# Patient Record
Sex: FEMALE | Race: WHITE | NOT HISPANIC OR LATINO | Employment: UNEMPLOYED | URBAN - METROPOLITAN AREA
[De-identification: names, ages, dates, MRNs, and addresses within clinical notes are randomized per-mention and may not be internally consistent; named-entity substitution may affect disease eponyms.]

---

## 2018-02-09 ENCOUNTER — RADIANT APPOINTMENT (OUTPATIENT)
Dept: GENERAL RADIOLOGY | Facility: CLINIC | Age: 28
End: 2018-02-09
Attending: FAMILY MEDICINE
Payer: COMMERCIAL

## 2018-02-09 ENCOUNTER — OFFICE VISIT (OUTPATIENT)
Dept: FAMILY MEDICINE | Facility: CLINIC | Age: 28
End: 2018-02-09
Payer: COMMERCIAL

## 2018-02-09 VITALS
HEART RATE: 76 BPM | HEIGHT: 67 IN | BODY MASS INDEX: 26.63 KG/M2 | SYSTOLIC BLOOD PRESSURE: 110 MMHG | TEMPERATURE: 98.5 F | DIASTOLIC BLOOD PRESSURE: 62 MMHG | WEIGHT: 169.7 LBS

## 2018-02-09 DIAGNOSIS — F41.1 GENERALIZED ANXIETY DISORDER: Primary | ICD-10-CM

## 2018-02-09 DIAGNOSIS — R07.89 ATYPICAL CHEST PAIN: ICD-10-CM

## 2018-02-09 PROCEDURE — 71046 X-RAY EXAM CHEST 2 VIEWS: CPT | Mod: FY

## 2018-02-09 PROCEDURE — 99203 OFFICE O/P NEW LOW 30 MIN: CPT | Performed by: FAMILY MEDICINE

## 2018-02-09 RX ORDER — ESCITALOPRAM OXALATE 10 MG/1
10 TABLET ORAL DAILY
Qty: 30 TABLET | Refills: 1 | Status: SHIPPED | OUTPATIENT
Start: 2018-02-09 | End: 2018-05-11

## 2018-02-09 ASSESSMENT — ANXIETY QUESTIONNAIRES
3. WORRYING TOO MUCH ABOUT DIFFERENT THINGS: MORE THAN HALF THE DAYS
GAD7 TOTAL SCORE: 11
7. FEELING AFRAID AS IF SOMETHING AWFUL MIGHT HAPPEN: MORE THAN HALF THE DAYS
1. FEELING NERVOUS, ANXIOUS, OR ON EDGE: MORE THAN HALF THE DAYS
5. BEING SO RESTLESS THAT IT IS HARD TO SIT STILL: NOT AT ALL
6. BECOMING EASILY ANNOYED OR IRRITABLE: SEVERAL DAYS
IF YOU CHECKED OFF ANY PROBLEMS ON THIS QUESTIONNAIRE, HOW DIFFICULT HAVE THESE PROBLEMS MADE IT FOR YOU TO DO YOUR WORK, TAKE CARE OF THINGS AT HOME, OR GET ALONG WITH OTHER PEOPLE: SOMEWHAT DIFFICULT
2. NOT BEING ABLE TO STOP OR CONTROL WORRYING: MORE THAN HALF THE DAYS

## 2018-02-09 ASSESSMENT — PATIENT HEALTH QUESTIONNAIRE - PHQ9: 5. POOR APPETITE OR OVEREATING: MORE THAN HALF THE DAYS

## 2018-02-09 NOTE — PROGRESS NOTES
SUBJECTIVE:   Amina Gramajo is a 27 year old female who presents to clinic today for the following health issues:    Urgent care couple weeks ago for chest pain  [facility] urgent care - January 25th  Chest pain started about one month ago  Seems different than the GERD she had with pregnancy  Feels like twinge - discomfort  Notices throughout the day and last for seconds  Unable to associated any symptoms   Not reproducible  No SOB, no cough, no exertional component  No association with food -   No nausea  Family hx - mom but paternal is AI  At the urgent care had EKG -         Hx of heart burn with pregnancy      Increasing anxiety the past few months  Started on esitalopram 5mg and just increased to 10mg daily this week  Had headaches for the first few days and nausea but resolved.      Second baby born 12/2016 - no postpartum issues      -------------------------------------    Problem list and histories reviewed & adjusted, as indicated.  Additional history: as documented    Patient Active Problem List   Diagnosis     Generalized anxiety disorder     Past Surgical History:   Procedure Laterality Date     APPENDECTOMY       ORTHOPEDIC SURGERY      wrist surgey       Social History   Substance Use Topics     Smoking status: Never Smoker     Smokeless tobacco: Never Used     Alcohol use Yes      Comment: few times a week     Family History   Problem Relation Age of Onset     Depression Maternal Grandmother      Dementia Maternal Grandmother      Depression Sister            Reviewed and updated as needed this visit by clinical staff  Tobacco  Allergies  Meds  Problems  Med Hx  Soc Hx      Reviewed and updated as needed this visit by Provider  Allergies  Meds  Problems  Med Hx         ROS:  Constitutional, HEENT, cardiovascular, pulmonary, GI, , musculoskeletal, neuro, skin, endocrine and psych systems are negative, except as otherwise noted.    OBJECTIVE:     /62  Pulse 76  Temp 98.5  F  "(36.9  C) (Oral)  Ht 5' 6.5\" (1.689 m)  Wt 169 lb 11.2 oz (77 kg)  BMI 26.98 kg/m2  Body mass index is 26.98 kg/(m^2).  GENERAL: healthy, alert and no distress  NECK: no adenopathy, no asymmetry, masses, or scars and thyroid normal to palpation  RESP: lungs clear to auscultation - no rales, rhonchi or wheezes  BREAST: normal without masses, tenderness or nipple discharge and no palpable axillary masses or adenopathy  CV: regular rate and rhythm, normal S1 S2, no S3 or S4, no murmur, click or rub, no peripheral edema and peripheral pulses strong    Diagnostic Test Results:  CXR - normal - no abnormalities     ASSESSMENT/PLAN:     1. Chest pain  Atypical chest pain -   EKG done at urgent care was normal and CXR today is normal.  Unclear etiology   Advised to monitor and RTC if not improving or any new symptoms arise.  Monitor for associated, aggravating and relieving factors.  - XR Chest 2 Views    2. Generalized anxiety disorder  Refilled for one more month  Will see back in one month for annual physcial and can f/u on anxiety  - escitalopram (LEXAPRO) 10 MG tablet; Take 1 tablet (10 mg) by mouth daily  Dispense: 30 tablet; Refill: 1    Pt will call or RTC if symptoms worsen or do not improve.      Ramila Reynolds, DO  [facility]    "

## 2018-02-09 NOTE — MR AVS SNAPSHOT
"              After Visit Summary   2018    Amina Gramajo    MRN: [id number]           Patient Information     Date Of Birth          1990        Visit Information        Provider Department      2018 12:30 PM Ramila Reynolds DO [facility]        Today's Diagnoses     Generalized anxiety disorder    -  1    Atypical chest pain           Follow-ups after your visit        Who to contact     If you have questions or need follow up information about today's clinic visit or your schedule please contact [facility] directly at [phone].  Normal or non-critical lab and imaging results will be communicated to you by [employer], letter or phone within 4 business days after the clinic has received the results. If you do not hear from us within 7 days, please contact the clinic through [employer] or phone. If you have a critical or abnormal lab result, we will notify you by phone as soon as possible.  Submit refill requests through [employer] or call your pharmacy and they will forward the refill request to us. Please allow 3 business days for your refill to be completed.          Additional Information About Your Visit        MyChart Information     [employer] lets you send messages to your doctor, view your test results, renew your prescriptions, schedule appointments and more. To sign up, go to [email or web address] . Click on \"Log in\" on the left side of the screen, which will take you to the Welcome page. Then click on \"Sign up Now\" on the right side of the page.     You will be asked to enter the access code listed below, as well as some personal information. Please follow the directions to create your username and password.     Your access code is: [email or web address]  Expires: 5/10/2018  2:24 PM     Your access code will  in 90 days. If you need help or a new code, please call your [place] clinic or [phone].        Care EveryWhere ID     This is your Care EveryWhere ID. This " CC:  Patient presents with:  New Patient: dizziness x2 weeks when she is laying down and stands up  Other: has an appointment with oral surgeon      HPI: 15year old female here for dizziness x 2 weeks.    Started after she did not get much sleep and had be "could be used by other organizations to access your [place] medical records  [id number]        Your Vitals Were     Pulse Temperature Height BMI (Body Mass Index)          76 98.5  F (36.9  C) (Oral) 5' 6.5\" (1.689 m) 26.98 kg/m2         Blood Pressure from Last 3 Encounters:   02/09/18 110/62   02/06/13 106/69    Weight from Last 3 Encounters:   02/09/18 169 lb 11.2 oz (77 kg)   09/30/06 126 lb 14.4 oz (57.6 kg) (66 %)*     * Growth percentiles are based on [facility] 2-20 Years data.              We Performed the Following     XR Chest 2 Views          Today's Medication Changes          These changes are accurate as of 2/9/18  2:24 PM.  If you have any questions, ask your nurse or doctor.               Start taking these medicines.        Dose/Directions    escitalopram 10 MG tablet   Commonly known as:  LEXAPRO   Used for:  Generalized anxiety disorder   Started by:  Ramila Reynolds DO        Dose:  10 mg   Take 1 tablet (10 mg) by mouth daily   Quantity:  30 tablet   Refills:  1         Stop taking these medicines if you haven't already. Please contact your care team if you have questions.     ADVIL 200 MG tablet   Generic drug:  ibuprofen   Stopped by:  Ramila Reynolds DO           PHENERGAN 25 MG tablet   Generic drug:  promethazine   Stopped by:  Ramila Reynolds DO           PREVACID 30 MG CR capsule   Generic drug:  LANsoprazole   Stopped by:  Ramila Reynolds DO                Where to get your medicines      These medications were sent to [place] [address] [place] [id number]     Phone:  [phone]     escitalopram 10 MG tablet                Primary Care Provider Office Phone # Fax #    Ramila Reynolds [phone] [address] [address]        Equal Access to Services     PINO PITTS AH: Mau ring Soyves, waaxda luqadaha, qaybta kaalmafany hwang. " Alcohol Use: Not on file    Drug Use: Not on file    Sexual Activity: Not on file   Not on file  Other Topics Concern   None on file     Social History Narrative   None on file         Current Outpatient Prescriptions:  Calcium Carbonate (CALCIUM 600 OR DO John  01/31/2017  1:16 PM [facility] [phone].    ATENCIÓN: Si habla servando, tiene a lin disposición servicios gratuitos de asistencia lingüística. Floyd al [phone].    We comply with applicable federal civil rights laws and [place] laws. We do not discriminate on the basis of race, color, national origin, age, disability, sex, sexual orientation, or gender identity.            Thank you!     Thank you for choosing [facility]  for your care. Our goal is always to provide you with excellent care. Hearing back from our patients is one way we can continue to improve our services. Please take a few minutes to complete the written survey that you may receive in the mail after your visit with us. Thank you!             Your Updated Medication List - Protect others around you: Learn how to safely use, store and throw away your medicines at www.disposemymeds.org.          This list is accurate as of 2/9/18  2:24 PM.  Always use your most recent med list.                   Brand Name Dispense Instructions for use Diagnosis    escitalopram 10 MG tablet    LEXAPRO    30 tablet    Take 1 tablet (10 mg) by mouth daily    Generalized anxiety disorder

## 2018-02-10 ENCOUNTER — HEALTH MAINTENANCE LETTER (OUTPATIENT)
Age: 28
End: 2018-02-10

## 2018-02-10 ASSESSMENT — ANXIETY QUESTIONNAIRES: GAD7 TOTAL SCORE: 11

## 2018-02-10 ASSESSMENT — PATIENT HEALTH QUESTIONNAIRE - PHQ9: SUM OF ALL RESPONSES TO PHQ QUESTIONS 1-9: 1

## 2018-02-11 ENCOUNTER — HEALTH MAINTENANCE LETTER (OUTPATIENT)
Age: 28
End: 2018-02-11

## 2018-03-22 ENCOUNTER — NURSE TRIAGE (OUTPATIENT)
Dept: NURSING | Facility: CLINIC | Age: 28
End: 2018-03-22

## 2018-03-22 NOTE — TELEPHONE ENCOUNTER
"Patient is calling from [place] and states she went out of town and forgot her medication. Patient gets her medications through [facility] and will call [facility] and ask for refill. Patient agrees with plan.  Additional Information    Negative: Drug overdose and nurse unable to answer question    Negative: Caller requesting information not related to medicine    Negative: Caller requesting a prescription for Strep throat and has a positive culture result    Negative: Rash while taking a medication or within 3 days of stopping it    Negative: Immunization reaction suspected    Negative: [1] Asthma and [2] having symptoms of asthma (cough, wheezing, etc)    Negative: MORE THAN A DOUBLE DOSE of a prescription or over-the-counter (OTC) drug    Negative: [1] DOUBLE DOSE (an extra dose or lesser amount) of over-the-counter (OTC) drug AND [2] any symptoms (e.g., dizziness, nausea, pain, sleepiness)    Negative: [1] DOUBLE DOSE (an extra dose or lesser amount) of prescription drug AND [2] any symptoms (e.g., dizziness, nausea, pain, sleepiness)    Negative: Took another person's prescription drug    Negative: [1] DOUBLE DOSE (an extra dose or lesser amount) of prescription drug AND [2] NO symptoms (Exception: a double dose of antibiotics)    Negative: Diabetes drug error or overdose (e.g., insulin or extra dose)    Negative: [1] Request for URGENT new prescription or refill of \"essential\" medication (i.e., likelihood of harm to patient if not taken) AND [2] triager unable to fill per unit policy    Negative: [1] Prescription not at pharmacy AND [2] was prescribed today by PCP    Negative: Pharmacy calling with prescription questions and triager unable to answer question    Negative: Caller has URGENT medication question about med that PCP prescribed and triager unable to answer question    Negative: Caller has NON-URGENT medication question about med that PCP prescribed and triager unable to answer question    Negative: " Caller requesting a NON-URGENT new prescription or refill and triager unable to refill per unit policy    Negative: Caller has medication question about med not prescribed by PCP and triager unable to answer question (e.g., compatibility with other med, storage)    Negative: [1] DOUBLE DOSE (an extra dose or lesser amount) of over-the-counter (OTC) drug AND [2] NO symptoms (all triage questions negative)    Negative: [1] DOUBLE DOSE (an extra dose or lesser amount) of antibiotic drug AND [2] NO symptoms (all triage questions negative)    Caller has medication question only, adult not sick, and triager answers question    Protocols used: MEDICATION QUESTION CALL-ADULT-

## 2018-04-12 ENCOUNTER — TELEPHONE (OUTPATIENT)
Dept: FAMILY MEDICINE | Facility: CLINIC | Age: 28
End: 2018-04-12

## 2018-04-12 NOTE — LETTER
April 12, 2018    Amina Gramajo  [address] [place] [address]      Dear Amina,    In order to ensure we are providing the best quality care, we have reviewed your chart and see that you are due for:    1. Physical  2. Pap Smear  3. Follow up on Medicatoin    Please call the clinic at your earliest convenience to schedule an appointment.  If you have completed these please contact our office via phone or [employer] to update our records.      Thank you for trusting us with your health care.      Sincerely,    Care Team for Ramila Reynolds MD

## 2018-04-12 NOTE — LETTER
In order to ensure we are providing the best quality care, we have reviewed your chart and see that you are due for:    1. Physical  2. Pap Smear  3. Follow up on Medicatoin    Please call the clinic at your earliest convenience to schedule an appointment.  If you have completed these please contact our office via phone or [employer] to update our records.      Thank you for trusting us with your health care.      Sincerely,    Care Team for Ramila Reynolds MD

## 2018-04-18 DIAGNOSIS — F41.1 GENERALIZED ANXIETY DISORDER: Primary | ICD-10-CM

## 2018-04-18 NOTE — TELEPHONE ENCOUNTER
"Due for follow up/physical.  Note from 2/9/18 OV:  2. Generalized anxiety disorder  Refilled for one more month  Will see back in one month for annual physcial and can f/u on anxiety  - escitalopram (LEXAPRO) 10 MG tablet; Take 1 tablet (10 mg) by mouth daily  Dispense: 30 tablet; Refill: 1     Zuleima Bernard RN  Requested Prescriptions   Pending Prescriptions Disp Refills     escitalopram (LEXAPRO) 10 MG tablet [Pharmacy Med Name: ESCITALOPRAM 10 MG TABLET] 30 tablet 0     Sig: TAKE 1 TABLET (10 MG) BY MOUTH DAILY    SSRIs Protocol Passed    4/18/2018  2:13 AM       Passed - Recent (12 mo) or future (30 days) visit within the authorizing provider's specialty    Patient had office visit in the last 12 months or has a visit in the next 30 days with authorizing provider or within the authorizing provider's specialty.  See \"Patient Info\" tab in inbasket, or \"Choose Columns\" in Meds & Orders section of the refill encounter.           Passed - Patient is age 18 or older       Passed - No active pregnancy on record       Passed - No positive pregnancy test in last 12 months            "

## 2018-04-20 RX ORDER — ESCITALOPRAM OXALATE 10 MG/1
TABLET ORAL
Qty: 30 TABLET | Refills: 0 | Status: SHIPPED | OUTPATIENT
Start: 2018-04-20 | End: 2018-05-11

## 2018-04-20 NOTE — TELEPHONE ENCOUNTER
Pt has physical scheduled on 5/11 at 11:30 ( which was 's first available) she is requesting  We proceed with the refill request

## 2018-04-20 NOTE — TELEPHONE ENCOUNTER
PN,  Left VM #2 for patient  See below messages  No response from patient to our outreach  Please advise on further refill  Thanks,  Kyra LUGO RN

## 2018-05-11 ENCOUNTER — RESULT FOLLOW UP (OUTPATIENT)
Dept: FAMILY MEDICINE | Facility: CLINIC | Age: 28
End: 2018-05-11

## 2018-05-11 ENCOUNTER — OFFICE VISIT (OUTPATIENT)
Dept: FAMILY MEDICINE | Facility: CLINIC | Age: 28
End: 2018-05-11
Payer: COMMERCIAL

## 2018-05-11 VITALS
HEIGHT: 67 IN | BODY MASS INDEX: 26.34 KG/M2 | OXYGEN SATURATION: 99 % | DIASTOLIC BLOOD PRESSURE: 58 MMHG | WEIGHT: 167.8 LBS | SYSTOLIC BLOOD PRESSURE: 108 MMHG | HEART RATE: 84 BPM | TEMPERATURE: 99.1 F

## 2018-05-11 DIAGNOSIS — F41.1 GENERALIZED ANXIETY DISORDER: ICD-10-CM

## 2018-05-11 DIAGNOSIS — Z98.890 HISTORY OF COLPOSCOPY: ICD-10-CM

## 2018-05-11 DIAGNOSIS — Z00.00 ENCOUNTER FOR ROUTINE ADULT HEALTH EXAMINATION WITHOUT ABNORMAL FINDINGS: Primary | ICD-10-CM

## 2018-05-11 DIAGNOSIS — Z32.01 PREGNANCY TEST POSITIVE: ICD-10-CM

## 2018-05-11 LAB — BETA HCG QUAL IFA URINE: POSITIVE

## 2018-05-11 PROCEDURE — 99213 OFFICE O/P EST LOW 20 MIN: CPT | Mod: 25 | Performed by: FAMILY MEDICINE

## 2018-05-11 PROCEDURE — 99395 PREV VISIT EST AGE 18-39: CPT | Performed by: FAMILY MEDICINE

## 2018-05-11 PROCEDURE — 87591 N.GONORRHOEAE DNA AMP PROB: CPT | Performed by: FAMILY MEDICINE

## 2018-05-11 PROCEDURE — 87491 CHLMYD TRACH DNA AMP PROBE: CPT | Performed by: FAMILY MEDICINE

## 2018-05-11 PROCEDURE — 84703 CHORIONIC GONADOTROPIN ASSAY: CPT | Performed by: FAMILY MEDICINE

## 2018-05-11 PROCEDURE — G0145 SCR C/V CYTO,THINLAYER,RESCR: HCPCS | Performed by: FAMILY MEDICINE

## 2018-05-11 RX ORDER — ESCITALOPRAM OXALATE 10 MG/1
TABLET ORAL
Qty: 45 TABLET | Refills: 0 | Status: SHIPPED | OUTPATIENT
Start: 2018-05-11 | End: 2018-06-27

## 2018-05-11 ASSESSMENT — ANXIETY QUESTIONNAIRES
1. FEELING NERVOUS, ANXIOUS, OR ON EDGE: NOT AT ALL
2. NOT BEING ABLE TO STOP OR CONTROL WORRYING: NOT AT ALL
GAD7 TOTAL SCORE: 0
3. WORRYING TOO MUCH ABOUT DIFFERENT THINGS: NOT AT ALL
6. BECOMING EASILY ANNOYED OR IRRITABLE: NOT AT ALL
7. FEELING AFRAID AS IF SOMETHING AWFUL MIGHT HAPPEN: NOT AT ALL
IF YOU CHECKED OFF ANY PROBLEMS ON THIS QUESTIONNAIRE, HOW DIFFICULT HAVE THESE PROBLEMS MADE IT FOR YOU TO DO YOUR WORK, TAKE CARE OF THINGS AT HOME, OR GET ALONG WITH OTHER PEOPLE: NOT DIFFICULT AT ALL
5. BEING SO RESTLESS THAT IT IS HARD TO SIT STILL: NOT AT ALL

## 2018-05-11 ASSESSMENT — PATIENT HEALTH QUESTIONNAIRE - PHQ9: 5. POOR APPETITE OR OVEREATING: NOT AT ALL

## 2018-05-11 NOTE — LETTER
April 28, 2019      Amina Aguayo  [address] [facility] [address]    Dear ,      This letter is to remind you that you are due for your follow up PAP smear on or about 05/11/19.    Please call [phone] to schedule your appointment at your earliest convenience.     If you have completed the tests outside of [place], please have the results forwarded to our office. We will update the chart for your primary Physician to review before your next annual physical.     Sincerely,      Your [place] Care Team/[facility]

## 2018-05-11 NOTE — LETTER
May 16, 2018      Amina DAVID Avila  [address] [facility] [address]    Dear ,      I am happy to inform you that your recent cervical cancer screening test (PAP smear) was normal.      Preventative screenings such as this help to ensure your health for years to come. You should repeat a pap smear in 1 year, unless otherwise directed.      You will also need to return to the clinic every year for your annual exam and other preventive tests.     Please contact the clinic at [phone] if you have further questions.       Sincerely,      Ramila Reynolds DO/[facility]

## 2018-05-11 NOTE — MR AVS SNAPSHOT
After Visit Summary   5/11/2018    Amina Gramajo    MRN: [id number]           Patient Information     Date Of Birth          1990        Visit Information        Provider Department      5/11/2018 11:30 AM Ramila Reynolds DO [facility]        Today's Diagnoses     Encounter for routine adult health examination without abnormal findings    -  1    Pregnancy test positive        Generalized anxiety disorder          Care Instructions      Preventive Health Recommendations  Female Ages 26 - 39  Yearly exam:   See your health care provider every year in order to    Review health changes.     Discuss preventive care.      Review your medicines if you your doctor has prescribed any.    Until age 30: Get a Pap test every three years (more often if you have had an abnormal result).    After age 30: Talk to your doctor about whether you should have a Pap test every 3 years or have a Pap test with HPV screening every 5 years.   You do not need a Pap test if your uterus was removed (hysterectomy) and you have not had cancer.  You should be tested each year for STDs (sexually transmitted diseases), if you're at risk.   Talk to your provider about how often to have your cholesterol checked.  If you are at risk for diabetes, you should have a diabetes test (fasting glucose).  Shots: Get a flu shot each year. Get a tetanus shot every 10 years.   Nutrition:     Eat at least 5 servings of fruits and vegetables each day.    Eat whole-grain bread, whole-wheat pasta and brown rice instead of white grains and rice.    Talk to your provider about Calcium and Vitamin D.     Lifestyle    Exercise at least 150 minutes a week (30 minutes a day, 5 days of the week). This will help you control your weight and prevent disease.    Limit alcohol to one drink per day.    No smoking.     Wear sunscreen to prevent skin cancer.    See your dentist every six months for an exam and cleaning.            Follow-ups  "after your visit        Your next 10 appointments already scheduled     2018  9:45 AM CDT   Pre-Op physical with Ramila Reynolds DO   [facility]    [address]  [facility] [address] [phone]              Future tests that were ordered for you today     Open Future Orders        Priority Expected Expires Ordered    US OB < 14 Weeks Single Routine  2019            Who to contact     If you have questions or need follow up information about today's clinic visit or your schedule please contact [facility] directly at [phone].  Normal or non-critical lab and imaging results will be communicated to you by MyChart, letter or phone within 4 business days after the clinic has received the results. If you do not hear from us within 7 days, please contact the clinic through [employer] or phone. If you have a critical or abnormal lab result, we will notify you by phone as soon as possible.  Submit refill requests through [employer] or call your pharmacy and they will forward the refill request to us. Please allow 3 business days for your refill to be completed.          Additional Information About Your Visit        MyChart Information     [employer] lets you send messages to your doctor, view your test results, renew your prescriptions, schedule appointments and more. To sign up, go to [email or web address] . Click on \"Log in\" on the left side of the screen, which will take you to the Welcome page. Then click on \"Sign up Now\" on the right side of the page.     You will be asked to enter the access code listed below, as well as some personal information. Please follow the directions to create your username and password.     Your access code is: [email or web address]  Expires: 2018  4:49 PM     Your access code will  in 90 days. If you need help or a new code, please call your [place] clinic or [phone].        Care EveryWhere ID  " "   This is your Care EveryWhere ID. This could be used by other organizations to access your [place] medical records  [id number]        Your Vitals Were     Pulse Temperature Height Last Period Pulse Oximetry BMI (Body Mass Index)    84 99.1  F (37.3  C) (Tympanic) 5' 6.5\" (1.689 m) 04/04/2018 99% 26.68 kg/m2       Blood Pressure from Last 3 Encounters:   05/11/18 108/58   02/09/18 110/62   02/09/18 110/62    Weight from Last 3 Encounters:   05/11/18 167 lb 12.8 oz (76.1 kg)   02/09/18 169 lb 11.2 oz (77 kg)   02/09/18 169 lb 11.2 oz (77 kg)              We Performed the Following     Beta HCG qual IFA urine - [facility] and [place]     CHLAMYDIA TRACHOMATIS PCR     NEISSERIA GONORRHOEA PCR     OFFICE/OUTPT VISIT,EST,LEVL III     Pap imaged thin layer screen reflex to HPV if ASCUS - recommend age 25 - 29          Today's Medication Changes          These changes are accurate as of 5/11/18  4:49 PM.  If you have any questions, ask your nurse or doctor.               These medicines have changed or have updated prescriptions.        Dose/Directions    escitalopram 10 MG tablet   Commonly known as:  LEXAPRO   This may have changed:  See the new instructions.   Used for:  Generalized anxiety disorder   Changed by:  Ramila Reynolds DO        TAKE 1/2  TABLET (5 MG) BY MOUTH DAILY   Quantity:  45 tablet   Refills:  0            Where to get your medicines      These medications were sent to [place] [address] [place] [id number]     Phone:  [phone]     escitalopram 10 MG tablet                Primary Care Provider Office Phone # Fax #    Ramila Reynolds [phone] [address] [address]        Equal Access to Services     PINO PITTS : Mau blando Soyves, waaxda luqadaha, qaybta kaalmada adeegyada, waxjames chapin. [facility] [phone].    ATENCIÓN: Si anna marshall " lin disposición servicios gratuitos de asistencia lingüística. Floyd haro [phone].    We comply with applicable federal civil rights laws and [place] laws. We do not discriminate on the basis of race, color, national origin, age, disability, sex, sexual orientation, or gender identity.            Thank you!     Thank you for choosing [facility]  for your care. Our goal is always to provide you with excellent care. Hearing back from our patients is one way we can continue to improve our services. Please take a few minutes to complete the written survey that you may receive in the mail after your visit with us. Thank you!             Your Updated Medication List - Protect others around you: Learn how to safely use, store and throw away your medicines at www.disposemymeds.org.          This list is accurate as of 5/11/18  4:49 PM.  Always use your most recent med list.                   Brand Name Dispense Instructions for use Diagnosis    escitalopram 10 MG tablet    LEXAPRO    45 tablet    TAKE 1/2  TABLET (5 MG) BY MOUTH DAILY    Generalized anxiety disorder

## 2018-05-11 NOTE — PROGRESS NOTES
SUBJECTIVE:   CC: Amina Gramajo is an 27 year old woman who presents for preventive health visit.     Physical   Annual:     Getting at least 3 servings of Calcium per day::  Yes    Bi-annual eye exam::  Yes    Dental care twice a year::  Yes    Sleep apnea or symptoms of sleep apnea::  None    Diet::  Regular (no restrictions)    Frequency of exercise::  2-3 days/week    Duration of exercise::  15-30 minutes    Taking medications regularly::  Yes    Medication side effects::  None    Additional concerns today::  YES            -------------------------------------    Today's PHQ-2 Score:   PHQ-2 (  [employer]) 2018   Q1: Little interest or pleasure in doing things 0   Q2: Feeling down, depressed or hopeless 0   PHQ-2 Score 0   Q1: Little interest or pleasure in doing things Not at all   Q2: Feeling down, depressed or hopeless Not at all   PHQ-2 Score 0     Took home UPT on   Not exactly sure but LMP was around 18  Started PNV  Wonders about her lexapro med if safe in pregnancy  Prior hx   G3 -   Had two live births  First  and second was Csection for large breech baby      Abuse: Current or Past(Physical, Sexual or Emotional)- NO  Do you feel safe in your environment - YES    Social History   Substance Use Topics     Smoking status: Never Smoker     Smokeless tobacco: Never Used     Alcohol use Yes      Comment: few times a week     Alcohol Use 2018   If you drink alcohol do you typically have greater than 3 drinks per day OR greater than 7 drinks per week? No   No flowsheet data found.    Reviewed orders with patient.  Reviewed health maintenance and updated orders accordingly - Yes  Patient Active Problem List   Diagnosis     Generalized anxiety disorder     Past Surgical History:   Procedure Laterality Date     APPENDECTOMY       ORTHOPEDIC SURGERY      wrist surgey       Social History   Substance Use Topics     Smoking status: Never Smoker     Smokeless tobacco: Never Used      "Alcohol use Yes      Comment: few times a week     Family History   Problem Relation Age of Onset     Depression Mother      Depression Maternal Grandmother      Dementia Maternal Grandmother      Depression Sister            Mammogram not appropriate for this patient based on age.    Pertinent mammograms are reviewed under the imaging tab.  History of abnormal Pap smear: NO - age 21-29 PAP every 3 years recommended    Reviewed and updated as needed this visit by clinical staff  Tobacco  Allergies  Meds  Problems         Reviewed and updated as needed this visit by Provider  Allergies  Meds  Problems            Review of Systems  CONSTITUTIONAL: NEGATIVE for fever, chills, change in weight  INTEGUMENTARU/SKIN: NEGATIVE for worrisome rashes, moles or lesions  EYES: NEGATIVE for vision changes or irritation  ENT: NEGATIVE for ear, mouth and throat problems  RESP: NEGATIVE for significant cough or SOB  BREAST: NEGATIVE for masses, tenderness or discharge  CV: NEGATIVE for chest pain, palpitations or peripheral edema  GI: NEGATIVE for nausea, abdominal pain, heartburn, or change in bowel habits   female: amenorrhea - pregnant  MUSCULOSKELETAL: NEGATIVE for significant arthralgias or myalgia  NEURO: NEGATIVE for weakness, dizziness or paresthesias  PSYCHIATRIC: NEGATIVE for changes in mood or affect     OBJECTIVE:   /58  Pulse 84  Temp 99.1  F (37.3  C) (Tympanic)  Ht 5' 6.5\" (1.689 m)  Wt 167 lb 12.8 oz (76.1 kg)  LMP 04/04/2018  SpO2 99%  BMI 26.68 kg/m2  Physical Exam  GENERAL: healthy, alert and no distress  EYES: Eyes grossly normal to inspection, PERRL and conjunctivae and sclerae normal  HENT: ear canals and TM's normal, nose and mouth without ulcers or lesions  NECK: no adenopathy, no asymmetry, masses, or scars and thyroid normal to palpation  RESP: lungs clear to auscultation - no rales, rhonchi or wheezes  BREAST: normal without masses, tenderness or nipple discharge and no palpable " "axillary masses or adenopathy  CV: regular rate and rhythm, normal S1 S2, no S3 or S4, no murmur, click or rub, no peripheral edema and peripheral pulses strong  ABDOMEN: soft, nontender, no hepatosplenomegaly, no masses and bowel sounds normal   (female): normal female external genitalia, normal urethral meatus, vaginal mucosa pink, moist, well rugated, and normal cervix/adnexa/uterus without masses or discharge  MS: no gross musculoskeletal defects noted, no edema  SKIN: no suspicious lesions or rashes  NEURO: Normal strength and tone, mentation intact and speech normal  PSYCH: mentation appears normal, affect normal/bright    ASSESSMENT/PLAN:   1. Encounter for routine adult health examination without abnormal findings  Routine screening  - Pap imaged thin layer screen reflex to HPV if ASCUS - recommend age 25 - 29  - NEISSERIA GONORRHOEA PCR  - CHLAMYDIA TRACHOMATIS PCR    2. Pregnancy test positive  Pt has positive pregnancy test -   Dates are unclear - suspect she is about 5+ weeks but she did have positive UPT early.  Will check pelvic ultrasound  Plan to see her around 10 weeks for first OB visit  Reviewed PNV  Discuss other options  - US OB < 14 Weeks Single; Future  - Beta HCG qual IFA urine - [facility] and [place]    3. Generalized anxiety disorder  Decrease dose from 5mg daily for a month or two.  Will reassess  - escitalopram (LEXAPRO) 10 MG tablet; TAKE 1/2  TABLET (5 MG) BY MOUTH DAILY  Dispense: 45 tablet; Refill: 0    COUNSELING:  Reviewed preventive health counseling, as reflected in patient instructions         reports that she has never smoked. She has never used smokeless tobacco.    Estimated body mass index is 26.68 kg/(m^2) as calculated from the following:    Height as of this encounter: 5' 6.5\" (1.689 m).    Weight as of this encounter: 167 lb 12.8 oz (76.1 kg).   Weight management plan: Discussed healthy diet and exercise guidelines and patient will follow up in 12 months in clinic to " re-evaluate.    Counseling Resources:  ATP IV Guidelines  Pooled Cohorts Equation Calculator  Breast Cancer Risk Calculator  FRAX Risk Assessment  ICSI Preventive Guidelines  Dietary Guidelines for Americans, 2010  USDA's MyPlate  ASA Prophylaxis  Lung CA Screening    Ramila Reynolds DO  [facility]

## 2018-05-11 NOTE — NURSING NOTE
"Chief Complaint   Patient presents with     Physical     /58  Pulse 84  Temp 99.1  F (37.3  C) (Tympanic)  Ht 5' 6.5\" (1.689 m)  Wt 167 lb 12.8 oz (76.1 kg)  LMP 04/04/2018  SpO2 99%  BMI 26.68 kg/m2 Estimated body mass index is 26.68 kg/(m^2) as calculated from the following:    Height as of this encounter: 5' 6.5\" (1.689 m).    Weight as of this encounter: 167 lb 12.8 oz (76.1 kg).        Health Maintenance due pending provider review:  Pap Smear    n/a    Lisandra Kelsey CMA  "

## 2018-05-12 ASSESSMENT — ANXIETY QUESTIONNAIRES: GAD7 TOTAL SCORE: 0

## 2018-05-13 LAB
N GONORRHOEA DNA SPEC QL NAA+PROBE: NEGATIVE
SPECIMEN SOURCE: NORMAL

## 2018-05-14 LAB
C TRACH DNA SPEC QL NAA+PROBE: POSITIVE
SPECIMEN SOURCE: ABNORMAL

## 2018-05-15 ENCOUNTER — TELEPHONE (OUTPATIENT)
Dept: FAMILY MEDICINE | Facility: CLINIC | Age: 28
End: 2018-05-15

## 2018-05-15 DIAGNOSIS — A74.9 CHLAMYDIA INFECTION: Primary | ICD-10-CM

## 2018-05-15 PROBLEM — Z98.890 HISTORY OF COLPOSCOPY: Status: ACTIVE | Noted: 2018-05-15

## 2018-05-15 LAB
COPATH REPORT: NORMAL
PAP: NORMAL

## 2018-05-15 RX ORDER — AZITHROMYCIN 500 MG/1
1000 TABLET, FILM COATED ORAL ONCE
Qty: 2 TABLET | Refills: 0 | Status: SHIPPED | OUTPATIENT
Start: 2018-05-15 | End: 2018-05-15

## 2018-05-15 NOTE — TELEPHONE ENCOUNTER
Called and left message for patient -   She is to call and ask to talk to triage RN    Chlamydia test was positive -   Please let her know the antibiotic Zithromax was sent to pharmacy - she should take 2 tablets at once with food.  This may upset her stomach.   Partner should be treated  Plan to recheck in one month.    Thanks  PN    Please complete the [facility] form   Thanks!

## 2018-05-15 NOTE — TELEPHONE ENCOUNTER
Spoke with pt and we reviewed the notes from PN. Pt is currently out of town and would like the Rx sent to that pharmacy. She will call back with the pharmacy information and triage will resend the Rx.    Chloe Olson RN

## 2018-05-16 NOTE — PROGRESS NOTES
[place] was done in 2012 with a result of KELLY I. No results to review.   05/11/18: NIL pap, Neg HR HPV result. Plan pap in 1 year per provider. Pt was advised.  04/28/19 Pap reminder letter sent. ([facility])  05/29/19 Spoke with pt, states she will call to schedule. ([facility])  06/26/19 Patient is lost to pap tracking follow-up. FYI routed to provider. ([facility])

## 2018-05-22 ENCOUNTER — TELEPHONE (OUTPATIENT)
Dept: FAMILY MEDICINE | Facility: CLINIC | Age: 28
End: 2018-05-22

## 2018-05-22 ENCOUNTER — HOSPITAL ENCOUNTER (OUTPATIENT)
Dept: ULTRASOUND IMAGING | Facility: CLINIC | Age: 28
Discharge: HOME | End: 2018-05-22
Attending: FAMILY MEDICINE | Admitting: FAMILY MEDICINE
Payer: COMMERCIAL

## 2018-05-22 DIAGNOSIS — Z32.01 PREGNANCY TEST POSITIVE: ICD-10-CM

## 2018-05-22 PROCEDURE — 76801 OB US < 14 WKS SINGLE FETUS: CPT

## 2018-05-22 NOTE — TELEPHONE ENCOUNTER
Reason for Call:  Other appointment    Detailed comments: PT would like to know if her appointment on 6/13 is ok still based on her ultrasound today.    Phone Number Patient can be reached at: Home number on file [phone] (home)    Best Time: any  Can we leave a detailed message on this number? YES    Call taken on 5/22/2018 at 10:53 AM by Claudia Butt

## 2018-05-22 NOTE — TELEPHONE ENCOUNTER
PN, please advise 06/13/2018 would place pt at 9+6 from US calculations today.  OK to keep appointment, or schedule for further out?    Lisandra Kelsey, CMA

## 2018-05-25 NOTE — TELEPHONE ENCOUNTER
Called to discuss   Small subchorionic hemorrhage  Will monitor  For hyperemesis - B6     Pt will call or RTC if symptoms worsen or do not improve.  PN

## 2018-05-25 NOTE — TELEPHONE ENCOUNTER
Pt called in this morning returning call.  She is aware that her appt date is ok, however she would like someone to call her and go over ultra sound results and dates to come concerning her care.  Please call [phone] and it is ok leave MARGARITA.

## 2018-05-25 NOTE — TELEPHONE ENCOUNTER
PN,  Pt says she did not get the US results.  I gave her SALONI and how far along US showed.  Please advise on rest of US.  Thanks,  Ita Katz RN    Triage- we can leave a detailed message

## 2018-06-05 ENCOUNTER — OFFICE VISIT (OUTPATIENT)
Dept: FAMILY MEDICINE | Facility: CLINIC | Age: 28
End: 2018-06-05
Payer: COMMERCIAL

## 2018-06-05 VITALS
WEIGHT: 162.9 LBS | SYSTOLIC BLOOD PRESSURE: 115 MMHG | HEART RATE: 77 BPM | HEIGHT: 67 IN | TEMPERATURE: 98.4 F | OXYGEN SATURATION: 98 % | DIASTOLIC BLOOD PRESSURE: 74 MMHG | BODY MASS INDEX: 25.57 KG/M2

## 2018-06-05 DIAGNOSIS — O20.9 VAGINAL BLEEDING IN PREGNANCY, FIRST TRIMESTER: Primary | ICD-10-CM

## 2018-06-05 LAB
ALBUMIN UR-MCNC: NEGATIVE MG/DL
APPEARANCE UR: CLEAR
BILIRUB UR QL STRIP: NEGATIVE
COLOR UR AUTO: YELLOW
GLUCOSE UR STRIP-MCNC: NEGATIVE MG/DL
HGB UR QL STRIP: NEGATIVE
KETONES UR STRIP-MCNC: 15 MG/DL
LEUKOCYTE ESTERASE UR QL STRIP: NEGATIVE
NITRATE UR QL: NEGATIVE
PH UR STRIP: 7 PH (ref 5–7)
SOURCE: ABNORMAL
SP GR UR STRIP: 1.01 (ref 1–1.03)
UROBILINOGEN UR STRIP-ACNC: 0.2 EU/DL (ref 0.2–1)

## 2018-06-05 PROCEDURE — 36415 COLL VENOUS BLD VENIPUNCTURE: CPT | Performed by: FAMILY MEDICINE

## 2018-06-05 PROCEDURE — 84702 CHORIONIC GONADOTROPIN TEST: CPT | Performed by: FAMILY MEDICINE

## 2018-06-05 PROCEDURE — 84144 ASSAY OF PROGESTERONE: CPT | Performed by: FAMILY MEDICINE

## 2018-06-05 PROCEDURE — 99213 OFFICE O/P EST LOW 20 MIN: CPT | Performed by: FAMILY MEDICINE

## 2018-06-05 PROCEDURE — 81003 URINALYSIS AUTO W/O SCOPE: CPT | Performed by: FAMILY MEDICINE

## 2018-06-05 NOTE — MR AVS SNAPSHOT
"              After Visit Summary   6/5/2018    Amina Gramajo    MRN: [id number]           Patient Information     Date Of Birth          1990        Visit Information        Provider Department      6/5/2018 3:00 PM Ramila Reynolds DO [facility]        Today's Diagnoses     Vaginal bleeding in pregnancy, first trimester    -  1       Follow-ups after your visit        Your next 10 appointments already scheduled     Jun 13, 2018  9:45 AM CDT   New Prenatal with Ramila Reynolds DO   [facility] ([facility])    [address]  [facility] [id number] [phone]              Future tests that were ordered for you today     Open Future Orders        Priority Expected Expires Ordered    HCG quantitative pregnancy Routine  6/5/2019 6/5/2018            Who to contact     If you have questions or need follow up information about today's clinic visit or your schedule please contact [facility] directly at [phone].  Normal or non-critical lab and imaging results will be communicated to you by [employer], letter or phone within 4 business days after the clinic has received the results. If you do not hear from us within 7 days, please contact the clinic through [employer] or phone. If you have a critical or abnormal lab result, we will notify you by phone as soon as possible.  Submit refill requests through [employer] or call your pharmacy and they will forward the refill request to us. Please allow 3 business days for your refill to be completed.          Additional Information About Your Visit        [employer]wn[employer] Information     [employer] lets you send messages to your doctor, view your test results, renew your prescriptions, schedule appointments and more. To sign up, go to [email or web address] . Click on \"Log in\" on the left side of the screen, which will take you to the Welcome page. Then click on \"Sign up Now\" on the right side of the page.     You will be " "asked to enter the access code listed below, as well as some personal information. Please follow the directions to create your username and password.     Your access code is: [email or web address]  Expires: 2018  4:49 PM     Your access code will  in 90 days. If you need help or a new code, please call your [facility] or [phone].        Care EveryWhere ID     This is your Care EveryWhere ID. This could be used by other organizations to access your [place] medical records  [id number]        Your Vitals Were     Pulse Temperature Height Last Period Pulse Oximetry Breastfeeding?    77 98.4  F (36.9  C) (Oral) 5' 6.5\" (1.689 m) 2018 98% Yes    BMI (Body Mass Index)                   25.9 kg/m2            Blood Pressure from Last 3 Encounters:   18 115/74   18 108/58   18 110/62    Weight from Last 3 Encounters:   18 162 lb 14.4 oz (73.9 kg)   18 167 lb 12.8 oz (76.1 kg)   18 169 lb 11.2 oz (77 kg)              We Performed the Following     HCG quantitative pregnancy     Progesterone        Primary Care Provider Office Phone # Fax #    Ramila MICHAEL Reynolds [phone] [address] [address]        Equal Access to Services     [facility]: Hadii aad ku hadasho Soomaali, waaxda luqadaha, qaybta kaalmada adeegyada, fany gaspar . So [facility] [phone].    ATENCIÓN: Si habla español, tiene a lin disposición servicios gratuitos de asistencia lingüística. Llame al [phone].    We comply with applicable federal civil rights laws and [place] laws. We do not discriminate on the basis of race, color, national origin, age, disability, sex, sexual orientation, or gender identity.            Thank you!     Thank you for choosing [facility]  for your care. Our goal is always to provide you with excellent care. Hearing back from our patients is one way we can continue to improve our services. " Please take a few minutes to complete the written survey that you may receive in the mail after your visit with us. Thank you!             Your Updated Medication List - Protect others around you: Learn how to safely use, store and throw away your medicines at www.disposemymeds.org.          This list is accurate as of 6/5/18  3:09 PM.  Always use your most recent med list.                   Brand Name Dispense Instructions for use Diagnosis    escitalopram 10 MG tablet    LEXAPRO    45 tablet    TAKE 1/2  TABLET (5 MG) BY MOUTH DAILY    Generalized anxiety disorder

## 2018-06-05 NOTE — TELEPHONE ENCOUNTER
Reason for call:  Patient reporting a symptom    Symptom or request: blood when wiped    Duration (how long have symptoms been present):     Have you been treated for this before? Yes    Additional comments: concerned would like to speak to someone    Phone Number patient can be reached at:  Home number on file [phone] (home)    Best Time:  anytime    Can we leave a detailed message on this number:  YES    Call taken on 6/5/2018 at 11:20 AM by Andi Cohen

## 2018-06-05 NOTE — PROGRESS NOTES
"  SUBJECTIVE:   Amina Gramajo is a 27 year old female who presents to clinic today for the following health issues:    Pt states she is concerned about little bleeding she had last night.     Went to bathroom -   Had vaginal bleeding - brown red blood that was about 1cm   Went to lay down, rested  No sharp cramping but some tightness -   Had episode of vomiting this week.  None since then  No recent sexual activity  Does not some urinary hesitancy     SALONI on January 10, 2019  Is 8+5 weeks today      -------------------------------------    Problem list and histories reviewed & adjusted, as indicated.  Additional history: as documented    Patient Active Problem List   Diagnosis     Generalized anxiety disorder     History of colposcopy     Past Surgical History:   Procedure Laterality Date     APPENDECTOMY       ORTHOPEDIC SURGERY      wrist surgey       Social History   Substance Use Topics     Smoking status: Never Smoker     Smokeless tobacco: Never Used     Alcohol use Yes      Comment: few times a week     Family History   Problem Relation Age of Onset     Depression Mother      Depression Maternal Grandmother      Dementia Maternal Grandmother      Depression Sister            Reviewed and updated as needed this visit by clinical staff  Tobacco  Allergies  Meds  Problems       Reviewed and updated as needed this visit by Provider  Allergies  Meds  Problems         ROS:  Constitutional, HEENT, cardiovascular, pulmonary, gi and gu systems are negative, except as otherwise noted.    OBJECTIVE:     /74  Pulse 77  Temp 98.4  F (36.9  C) (Oral)  Ht 5' 6.5\" (1.689 m)  Wt 162 lb 14.4 oz (73.9 kg)  LMP 04/04/2018  SpO2 98%  Breastfeeding? Yes  BMI 25.9 kg/m2  Body mass index is 25.9 kg/(m^2).  GENERAL: healthy, alert and no distress  ABDOMEN: soft, nontender, no hepatosplenomegaly, no masses and bowel sounds normal   (female): normal female external genitalia, normal urethral meatus, vaginal " mucosa, normal cervix/adnexa/uterus without masses or discharge    Diagnostic Test Results:  Results for orders placed or performed in visit on 06/05/18 (from the past 24 hour(s))   *UA reflex to Microscopic and Culture ([place] and [facility] (except [place] and [place])   Result Value Ref Range    Color Urine Yellow     Appearance Urine Clear     Glucose Urine Negative NEG^Negative mg/dL    Bilirubin Urine Negative NEG^Negative    Ketones Urine 15 (A) NEG^Negative mg/dL    Specific Gravity Urine 1.015 1.003 - 1.035    Blood Urine Negative NEG^Negative    pH Urine 7.0 5.0 - 7.0 pH    Protein Albumin Urine Negative NEG^Negative mg/dL    Urobilinogen Urine 0.2 0.2 - 1.0 EU/dL    Nitrite Urine Negative NEG^Negative    Leukocyte Esterase Urine Negative NEG^Negative    Source Midstream Urine        ASSESSMENT/PLAN:   1. Vaginal bleeding in pregnancy, first trimester  Vaginal spotting early in pregnancy    She did have ultrasound that showed a subchorionic hemorrhage and this may be the cause.  Will check some labs today and again in 48 hours to make sure HCG is doubling  Will r/o UTI  Advised to monitor  - HCG quantitative pregnancy  - Progesterone  - HCG quantitative pregnancy; Future  - *UA reflex to Microscopic and Culture ([place] and [facility] (except [place] and [place])    Pt will call or RTC if symptoms worsen or do not improve.      Ramila Reynolds, DO  [facility]

## 2018-06-05 NOTE — TELEPHONE ENCOUNTER
PN  Pt noticed a very small amount of blood yesterday afternoon, smaller than a dime and no cramping. Says her nausea is gone is taking the B6. A little paranoid since her symptoms are gone. Continue to monitor or have her come in today for quick check?   Thank you,  LG    Pt is pretty open all day to come in

## 2018-06-06 LAB
B-HCG SERPL-ACNC: ABNORMAL IU/L (ref 0–5)
PROGEST SERPL-MCNC: 17.6 NG/ML

## 2018-06-06 NOTE — PROGRESS NOTES
Please call and let her know the HCG and progesterone look good -   Will await Thursdays lab results.  It should go up but may not double based on how high the number is.  Thanks  PN

## 2018-06-07 DIAGNOSIS — O20.9 VAGINAL BLEEDING IN PREGNANCY, FIRST TRIMESTER: ICD-10-CM

## 2018-06-07 LAB — B-HCG SERPL-ACNC: ABNORMAL IU/L (ref 0–5)

## 2018-06-07 PROCEDURE — 84702 CHORIONIC GONADOTROPIN TEST: CPT | Performed by: FAMILY MEDICINE

## 2018-06-07 PROCEDURE — 36415 COLL VENOUS BLD VENIPUNCTURE: CPT | Performed by: FAMILY MEDICINE

## 2018-06-08 ENCOUNTER — HOSPITAL ENCOUNTER (OUTPATIENT)
Dept: ULTRASOUND IMAGING | Facility: CLINIC | Age: 28
Discharge: HOME | End: 2018-06-08
Attending: FAMILY MEDICINE | Admitting: FAMILY MEDICINE
Payer: COMMERCIAL

## 2018-06-08 ENCOUNTER — TELEPHONE (OUTPATIENT)
Dept: FAMILY MEDICINE | Facility: CLINIC | Age: 28
End: 2018-06-08

## 2018-06-08 DIAGNOSIS — O20.9 VAGINAL BLEEDING IN PREGNANCY, FIRST TRIMESTER: ICD-10-CM

## 2018-06-08 LAB — RADIOLOGIST FLAGS: ABNORMAL

## 2018-06-08 PROCEDURE — 76801 OB US < 14 WKS SINGLE FETUS: CPT

## 2018-06-08 NOTE — TELEPHONE ENCOUNTER
Called and left message  Concerning that the HCG is down a little with the spotting.  Would like to check pelvic ultrasound and meet with OB to discuss if anything concerning  PN

## 2018-06-08 NOTE — TELEPHONE ENCOUNTER
Reason for Call:  Request for results:    Name of test or procedure: Lab & US    Date of test of procedure: Lab (6/7) & US (6/8)    Location of the test or procedure: (Lab) [facility] & (US) [facility]    OK to leave the result message on voice mail or with a family member? YES    Phone number Patient can be reached at:  Home number on file [phone] (home)    Additional comments: call with both results    Call taken on 6/8/2018 at 12:01 PM by Mil Olvera

## 2018-06-08 NOTE — TELEPHONE ENCOUNTER
Reason for Call:  Other     Detailed comments: Pt is returning Dr. Reynolds's call.    Phone Number Patient can be reached at: Home number on file [phone] (home)    Best Time: any    Can we leave a detailed message on this number? YES    Call taken on 6/8/2018 at 8:17 AM by Claudia Butt

## 2018-06-08 NOTE — TELEPHONE ENCOUNTER
PN,  Please see jaylin and advise.  Pt returning your call.  Please advise.  Thanks,  Ita Katz RN

## 2018-06-08 NOTE — TELEPHONE ENCOUNTER
Called and left message -   Ultrasound today shows enlarging subchorionic hemorrhage  Would like to reassess in one week -   Plan to have her see OB after to discuss  Ordered  PN

## 2018-06-08 NOTE — TELEPHONE ENCOUNTER
Patient read Alvin message from PN  Called her back regarding this  Wants to get US done today  Gave her #s for [facility], [facility] [place], and [facility]  Kyra LUGO RN

## 2018-06-08 NOTE — PROGRESS NOTES
Dear Amina,   Your test results are all back -   I just tried to call but your phone went straight to voice mail.  Your HCG is not going up like we hope to see.  I think we should order a pelvic ultrasound to evaluate further.  Let me know how you are doing and if you have had any cramping or additional bleeding.  -Ramila Reynolds, DO

## 2018-06-08 NOTE — TELEPHONE ENCOUNTER
Patient called back to talk to Dr. Reynolds.  She will keep her phone with her and await a call back.    Patient can be reached at:  [phone]  Ok to leave detailed message

## 2018-06-12 ENCOUNTER — RADIANT APPOINTMENT (OUTPATIENT)
Dept: ULTRASOUND IMAGING | Facility: CLINIC | Age: 28
End: 2018-06-12
Attending: FAMILY MEDICINE
Payer: COMMERCIAL

## 2018-06-12 PROCEDURE — 76815 OB US LIMITED FETUS(S): CPT | Performed by: OBSTETRICS & GYNECOLOGY

## 2018-06-13 ENCOUNTER — PRENATAL OFFICE VISIT (OUTPATIENT)
Dept: FAMILY MEDICINE | Facility: CLINIC | Age: 28
End: 2018-06-13
Payer: COMMERCIAL

## 2018-06-13 VITALS
SYSTOLIC BLOOD PRESSURE: 109 MMHG | HEART RATE: 80 BPM | TEMPERATURE: 98.4 F | OXYGEN SATURATION: 98 % | HEIGHT: 67 IN | WEIGHT: 162.2 LBS | DIASTOLIC BLOOD PRESSURE: 71 MMHG | BODY MASS INDEX: 25.46 KG/M2

## 2018-06-13 DIAGNOSIS — O09.90 HIGH-RISK PREGNANCY, UNSPECIFIED TRIMESTER: Primary | ICD-10-CM

## 2018-06-13 LAB
ABO + RH BLD: NORMAL
ABO + RH BLD: NORMAL
BLD GP AB SCN SERPL QL: NORMAL
BLOOD BANK CMNT PATIENT-IMP: NORMAL
ERYTHROCYTE [DISTWIDTH] IN BLOOD BY AUTOMATED COUNT: 12.8 % (ref 10–15)
HCT VFR BLD AUTO: 40.1 % (ref 35–47)
HGB BLD-MCNC: 13.9 G/DL (ref 11.7–15.7)
MCH RBC QN AUTO: 30.3 PG (ref 26.5–33)
MCHC RBC AUTO-ENTMCNC: 34.7 G/DL (ref 31.5–36.5)
MCV RBC AUTO: 88 FL (ref 78–100)
PLATELET # BLD AUTO: 245 10E9/L (ref 150–450)
RBC # BLD AUTO: 4.58 10E12/L (ref 3.8–5.2)
SPECIMEN EXP DATE BLD: NORMAL
WBC # BLD AUTO: 6.2 10E9/L (ref 4–11)

## 2018-06-13 PROCEDURE — 87340 HEPATITIS B SURFACE AG IA: CPT | Performed by: FAMILY MEDICINE

## 2018-06-13 PROCEDURE — 86780 TREPONEMA PALLIDUM: CPT | Performed by: FAMILY MEDICINE

## 2018-06-13 PROCEDURE — 87389 HIV-1 AG W/HIV-1&-2 AB AG IA: CPT | Performed by: FAMILY MEDICINE

## 2018-06-13 PROCEDURE — 87491 CHLMYD TRACH DNA AMP PROBE: CPT | Performed by: FAMILY MEDICINE

## 2018-06-13 PROCEDURE — 86762 RUBELLA ANTIBODY: CPT | Performed by: FAMILY MEDICINE

## 2018-06-13 PROCEDURE — 87086 URINE CULTURE/COLONY COUNT: CPT | Performed by: FAMILY MEDICINE

## 2018-06-13 PROCEDURE — 86901 BLOOD TYPING SEROLOGIC RH(D): CPT | Performed by: FAMILY MEDICINE

## 2018-06-13 PROCEDURE — 99207 ZZC FIRST OB VISIT: CPT | Performed by: FAMILY MEDICINE

## 2018-06-13 PROCEDURE — 86900 BLOOD TYPING SEROLOGIC ABO: CPT | Performed by: FAMILY MEDICINE

## 2018-06-13 PROCEDURE — 86850 RBC ANTIBODY SCREEN: CPT | Performed by: FAMILY MEDICINE

## 2018-06-13 PROCEDURE — 36415 COLL VENOUS BLD VENIPUNCTURE: CPT | Performed by: FAMILY MEDICINE

## 2018-06-13 PROCEDURE — 85027 COMPLETE CBC AUTOMATED: CPT | Performed by: FAMILY MEDICINE

## 2018-06-13 NOTE — PROGRESS NOTES
"  SUBJECTIVE: Amina Gramajo is an 27 year old female here for initial OB visit.    Past Medical History of Father of Baby: No significant medical history    Review of Systems:   Constitutional, neuro, ENT, endocrine, pulmonary, cardiac, gastrointestinal, genitourinary, musculoskeletal, integument and psychiatric systems are negative, except as otherwise noted.    Pt has had recent episode of vaginal spotting -   Ultrasound showed that she had subchorionic hemorrhage   She had repeat that showed interval growth of the hemorrhage  Still awaiting the most recent results    Pt also had pelvic with physical - during pregnancy -  Chlamydia test was positive - treated with abx  Will retest today           1 Term 14 41w2d 3.03 kg (6 lb 10.9 oz) M Vag Y     SAB 2016    S/p PLTCS for breech in labor.   Baby is a Sex: Male  Delivery Date: 16  Delivery Time: 314  Presentation: Breech  Position: RSA  Baby A: 1 Minute Apgar Total: 9  Baby A: 5 Minute Apgar Total: 9  Baby A: Birth Weight (grams) : 4560 GRAMS    History Since Last Menstrual Period: Bleeding    EXAM: Blood pressure 109/71, pulse 80, temperature 98.4  F (36.9  C), temperature source Oral, height 5' 6.5\" (1.689 m), weight 162 lb 3.2 oz (73.6 kg), last menstrual period 2018, SpO2 98 %, currently breastfeeding.  GENERAL APPEARANCE: healthy, alert and no distress  RESP: lungs clear to auscultation - no rales, rhonchi or wheezes  CV: regular rates and rhythm, normal S1 S2, no S3 or S4 and no murmur, click or rub -  ABDOMEN:  soft, nontender, no HSM or masses and bowel sounds normal  GU_female: normal cervix   Uterus approx 10 weeks    Pelvix exam:  Perineum: Intact;   Vulva: Normal;  Vagina: Normal mucosa, no discharge,   Cervix: Parous, closed, mobile, no discharge;  Uterus: 10 weeks  Adnexa: Normal;  FHT - 150's    ASSESSMENT/ PLAN:  (O09.90) High-risk pregnancy, unspecified trimester  (primary encounter diagnosis)  Comment: had " bleeeing  Has hemorrhage but also had chlamydia infection  Will recheck today  Plan: CBC with Platelets, HIV Antigen Antibody Combo,        Rubella Antibody IgG Quantitative, Hepatitis B         surface antigen, Treponema Abs w Reflex to RPR         and Titer, ABO/RH Type and Screen, Urine         Culture Aerobic Bacterial, Chlamydia         trachomatis PCR, Non Invasive Prenatal Test         Cell Free DNA             (O41.8X10,  O46.8X1) Subchorionic hemorrhage of placenta in first trimester, single or unspecified fetus  Comment: will have her meet with OB after her ultrasound  Plan:        Follow up in 4 weeks.  Restrict exercise:    Limited sexual activity:  .  Prenatal vitamins.  Anticipated weight gain.

## 2018-06-13 NOTE — NURSING NOTE
"Chief Complaint   Patient presents with     Prenatal Care     /71  Pulse 80  Temp 98.4  F (36.9  C) (Oral)  Ht 5' 6.5\" (1.689 m)  Wt 162 lb 3.2 oz (73.6 kg)  LMP 04/04/2018  SpO2 98%  BMI 25.79 kg/m2 Estimated body mass index is 25.79 kg/(m^2) as calculated from the following:    Height as of this encounter: 5' 6.5\" (1.689 m).    Weight as of this encounter: 162 lb 3.2 oz (73.6 kg).  Medication Reconciliation: complete      Health Maintenance that is potentially due pending provider review:  NONE    n/a    GAURAV Parr  "

## 2018-06-13 NOTE — MR AVS SNAPSHOT
After Visit Summary   6/13/2018    Amina Gramajo    MRN: [id number]           Patient Information     Date Of Birth          1990        Visit Information        Provider Department      6/13/2018 9:45 AM Ramila Reynolds DO [facility]        Today's Diagnoses     High-risk pregnancy, unspecified trimester    -  1    Subchorionic hemorrhage of placenta in first trimester, single or unspecified fetus           Follow-ups after your visit        Your next 10 appointments already scheduled     Jun 14, 2018 10:00 AM CDT   Office Visit with Renea Wilcox MD   [facility] ([facility])    [address] [place] [address] [address]  [facility] [phone]           Bring a current list of meds and any records pertaining to this visit. For Physicals, please bring immunization records and any forms needing to be filled out. Please arrive 10 minutes early to complete paperwork.            Jun 18, 2018 10:30 AM CDT   LAB with WE LAB   [facility] ([facility])    [address] [facility] [address]  [facility] [id number] [phone]           Please do not eat 10-12 hours before your appointment if you are coming in fasting for labs on lipids, cholesterol, or glucose (sugar). This does not apply to pregnant women. Water, hot tea and black coffee (with nothing added) are okay. Do not drink other fluids, diet soda or chew gum.            Jul 13, 2018  3:15 PM CDT   ESTABLISHED PRENATAL with Ramila Reynolds DO   [facility] ([facility])    [address]  [facility] [id number] [phone]              Future tests that were ordered for you today     Open Future Orders        Priority Expected Expires Ordered    Non Invasive Prenatal Test Cell Free DNA Routine  6/13/2019 6/13/2018            Who to contact     If you have questions or need follow up information about today's  "clinic visit or your schedule please contact [facility] directly at [phone].  Normal or non-critical lab and imaging results will be communicated to you by MyChart, letter or phone within 4 business days after the clinic has received the results. If you do not hear from us within 7 days, please contact the clinic through Carrot.mxhart or phone. If you have a critical or abnormal lab result, we will notify you by phone as soon as possible.  Submit refill requests through [employer]ol[employer] or call your pharmacy and they will forward the refill request to us. Please allow 3 business days for your refill to be completed.          Additional Information About Your Visit        [email or web address] Information     [employer] gives you secure access to your electronic health record. If you see a primary care provider, you can also send messages to your care team and make appointments. If you have questions, please call your primary care clinic.  If you do not have a primary care provider, please call [phone] and they will assist you.        Care EveryWhere ID     This is your Care EveryWhere ID. This could be used by other organizations to access your [place] medical records  [id number]        Your Vitals Were     Pulse Temperature Height Last Period Pulse Oximetry BMI (Body Mass Index)    80 98.4  F (36.9  C) (Oral) 5' 6.5\" (1.689 m) 04/04/2018 98% 25.79 kg/m2       Blood Pressure from Last 3 Encounters:   06/13/18 109/71   06/05/18 115/74   05/11/18 108/58    Weight from Last 3 Encounters:   06/13/18 162 lb 3.2 oz (73.6 kg)   06/05/18 162 lb 14.4 oz (73.9 kg)   05/11/18 167 lb 12.8 oz (76.1 kg)              We Performed the Following     ABO/RH Type and Screen     CBC with Platelets     Chlamydia trachomatis PCR     Hepatitis B surface antigen     HIV Antigen Antibody Combo     Rubella Antibody IgG Quantitative     Treponema Abs w Reflex to RPR and Titer     Urine Culture Aerobic Bacterial        Primary Care Provider Office " Phone # Fax #    Ramila Reynolds [phone] [address] [address]        Equal Access to Services     PINO PITTS : Hadii olamide ku hadpatricko Sopepeali, waaxda luqadaha, qaybta kaalmada adeegyada, fany chilel laMadhavisidra chapin. So [facility] [phone].    ATENCIÓN: Si habla español, tiene a lin disposición servicios gratuitos de asistencia lingüística. Llame al [phone].    We comply with applicable federal civil rights laws and [place] laws. We do not discriminate on the basis of race, color, national origin, age, disability, sex, sexual orientation, or gender identity.            Thank you!     Thank you for choosing [facility]  for your care. Our goal is always to provide you with excellent care. Hearing back from our patients is one way we can continue to improve our services. Please take a few minutes to complete the written survey that you may receive in the mail after your visit with us. Thank you!             Your Updated Medication List - Protect others around you: Learn how to safely use, store and throw away your medicines at www.disposemymeds.org.          This list is accurate as of 6/13/18  6:03 PM.  Always use your most recent med list.                   Brand Name Dispense Instructions for use Diagnosis    escitalopram 10 MG tablet    LEXAPRO    45 tablet    TAKE 1/2  TABLET (5 MG) BY MOUTH DAILY    Generalized anxiety disorder       PRENATAL PO           VITAMIN B6 PO

## 2018-06-14 ENCOUNTER — TELEPHONE (OUTPATIENT)
Dept: OBGYN | Facility: CLINIC | Age: 28
End: 2018-06-14

## 2018-06-14 ENCOUNTER — OFFICE VISIT (OUTPATIENT)
Dept: OBGYN | Facility: CLINIC | Age: 28
End: 2018-06-14
Attending: FAMILY MEDICINE
Payer: COMMERCIAL

## 2018-06-14 VITALS
HEART RATE: 106 BPM | HEIGHT: 67 IN | OXYGEN SATURATION: 96 % | WEIGHT: 164 LBS | BODY MASS INDEX: 25.74 KG/M2 | DIASTOLIC BLOOD PRESSURE: 62 MMHG | SYSTOLIC BLOOD PRESSURE: 127 MMHG

## 2018-06-14 LAB
BACTERIA SPEC CULT: NORMAL
C TRACH DNA SPEC QL NAA+PROBE: NEGATIVE
HBV SURFACE AG SERPL QL IA: NONREACTIVE
HIV 1+2 AB+HIV1 P24 AG SERPL QL IA: NONREACTIVE
RUBV IGG SERPL IA-ACNC: 12 IU/ML
SPECIMEN SOURCE: NORMAL
SPECIMEN SOURCE: NORMAL
T PALLIDUM AB SER QL: NONREACTIVE

## 2018-06-14 PROCEDURE — 99202 OFFICE O/P NEW SF 15 MIN: CPT | Performed by: OBSTETRICS & GYNECOLOGY

## 2018-06-14 NOTE — Clinical Note
Hello As of now the final report is still not in the chart.  Our Triage nurses called [facility] this morning and weren't able to expedite it.   I hope Ms. Gramajo was OK with my advice--unless she has a first trimester screen with MFM, she probably doesn't need another scan until her fetal survey. Thanks- Renea

## 2018-06-14 NOTE — TELEPHONE ENCOUNTER
The Patient asked that you view the results as soon as they are available   [phone]  Or My Chart Message is fine

## 2018-06-14 NOTE — PROGRESS NOTES
"SUBJECTIVE:  Amina Gramajo is a 27 year old  who presents to discuss first trimester bleeding, recent ultrasound.  She has had a small amount of light bleeding per vagina.  An ultrasound 18 showed a 6w5d viable fetus, with a 1.9 x 0.9 x 0.8 cm area of subchorionic hemorrhage.  A repeat ultrasound 18 showed a 9w4d fetus with cardiac activity, a 2.7 x 4.4 x 1.0 cm area of chorionic hemorrhage.  She had a third ultrasound 18, and is here today to review the report.  Unfortunately the report has not yet been completed, so I reviewed the images and the sonographer's measurements, and shared them with Ms. Gramajo.  A 10w1d fetus is seen, with FHTs 174.  A sonolucent area consistent with subchorionic hemorrhage appears to measure 1.7 x 0.76 x 2.7 cm.  Her blood type is AB+.      OBJECTIVE: /62  Pulse 106  Ht 5' 6.5\" (1.689 m)  Wt 164 lb (74.4 kg)  LMP 2018  SpO2 96%  Breastfeeding? No  BMI 26.07 kg/m2 Patient was not otherwise examined.      ASSESSMENT: Early viable IUP.  Minimal first trimester bleeding.  Subchorionic hemorrhage.      PLAN: We discussed that the most reassuring finding is the appropriate growth of the fetus, as well as her lack of significant vaginal bleeding.  We discussed subchorionic hemorrhage which may be present with minimal or no clinical bleeding, and which most likely will resolve.  We discussed the limitations of accurate measurement of an irregular area of subchorionic hemorrhage.  She will contact her primary OB for the formal ultrasound report, and will continue prenatal care.      Please note greater than 50% of this 20 minute appointment were spent in counseling with the patient of the issues described above in the history of present illness and in the plan, including evaluation and managment of bleeding in early pregnancy.    "

## 2018-06-14 NOTE — MR AVS SNAPSHOT
After Visit Summary   6/14/2018    Amina Gramajo    MRN: [id number]           Patient Information     Date Of Birth          1990        Visit Information        Provider Department      6/14/2018 10:00 AM Renea Wilcox MD [facility]        Today's Diagnoses     Subchorionic hemorrhage of placenta in first trimester, single or unspecified fetus    -  1       Follow-ups after your visit        Your next 10 appointments already scheduled     Jun 18, 2018 10:30 AM CDT   LAB with WE LAB   [facility] ([facility])    [address] [place] [id number]   [phone]           Please do not eat 10-12 hours before your appointment if you are coming in fasting for labs on lipids, cholesterol, or glucose (sugar). This does not apply to pregnant women. Water, hot tea and black coffee (with nothing added) are okay. Do not drink other fluids, diet soda or chew gum.            Jul 13, 2018  3:15 PM CDT   ESTABLISHED PRENATAL with Ramila Reynolds DO   [facility] ([facility])    [address]  [facility] [address] [phone]              Future tests that were ordered for you today     Open Future Orders        Priority Expected Expires Ordered    Non Invasive Prenatal Test Cell Free DNA Routine  6/13/2019 6/13/2018            Who to contact     If you have questions or need follow up information about today's clinic visit or your schedule please contact [facility] directly at [phone].  Normal or non-critical lab and imaging results will be communicated to you by MyChart, letter or phone within 4 business days after the clinic has received the results. If you do not hear from us within 7 days, please contact the clinic through MyChart or phone. If you have a critical or abnormal lab result, we will notify you by phone as soon as possible.  Submit refill requests  "through [employer] or call your pharmacy and they will forward the refill request to us. Please allow 3 business days for your refill to be completed.          Additional Information About Your Visit        CopperGate Communicationshart Information     [employer] gives you secure access to your electronic health record. If you see a primary care provider, you can also send messages to your care team and make appointments. If you have questions, please call your primary care clinic.  If you do not have a primary care provider, please call [phone] and they will assist you.        Care EveryWhere ID     This is your Care EveryWhere ID. This could be used by other organizations to access your [facility] medical records  [id number]        Your Vitals Were     Pulse Height Last Period Pulse Oximetry Breastfeeding? BMI (Body Mass Index)    106 5' 6.5\" (1.689 m) 04/04/2018 96% No 26.07 kg/m2       Blood Pressure from Last 3 Encounters:   06/14/18 127/62   06/13/18 109/71   06/05/18 115/74    Weight from Last 3 Encounters:   06/14/18 164 lb (74.4 kg)   06/13/18 162 lb 3.2 oz (73.6 kg)   06/05/18 162 lb 14.4 oz (73.9 kg)              Today, you had the following     No orders found for display       Primary Care Provider Office Phone # Fax #    Ramila MICHAEL Reynolds [phone]       [address] [address]        Equal Access to Services     [facility]EP [facility] : Hadii aad ku hadasho Soomaali, waaxda luqadaha, qaybta kaalmada adeegyada, fany gaspar . So [facility] [phone].    ATENCIÓN: Si habla español, tiene a lin disposición servicios gratuitos de asistencia lingüística. Llame al [phone].    We comply with applicable federal civil rights laws and [place] laws. We do not discriminate on the basis of race, color, national origin, age, disability, sex, sexual orientation, or gender identity.            Thank you!     Thank you for choosing [facility]  for your care. Our " goal is always to provide you with excellent care. Hearing back from our patients is one way we can continue to improve our services. Please take a few minutes to complete the written survey that you may receive in the mail after your visit with us. Thank you!             Your Updated Medication List - Protect others around you: Learn how to safely use, store and throw away your medicines at www.disposemymeds.org.          This list is accurate as of 6/14/18  2:42 PM.  Always use your most recent med list.                   Brand Name Dispense Instructions for use Diagnosis    escitalopram 10 MG tablet    LEXAPRO    45 tablet    TAKE 1/2  TABLET (5 MG) BY MOUTH DAILY    Generalized anxiety disorder       PRENATAL PO           VITAMIN B6 PO

## 2018-06-15 ENCOUNTER — MYC MEDICAL ADVICE (OUTPATIENT)
Dept: FAMILY MEDICINE | Facility: CLINIC | Age: 28
End: 2018-06-15

## 2018-06-15 ENCOUNTER — TELEPHONE (OUTPATIENT)
Dept: FAMILY MEDICINE | Facility: CLINIC | Age: 28
End: 2018-06-15

## 2018-06-15 NOTE — PROGRESS NOTES
Dear Amina,   Your test results are all back -   -All of your labs are normal.  Let us know if you have any questions.  -Ramila Reynolds, DO

## 2018-06-15 NOTE — TELEPHONE ENCOUNTER
WILBERTO Ritter from [employer] (Genetic/DNA testing) stopped by.  Patient needs to have testing done and Stone will be stopping by today at 1:00 to talk to lab on process  She is going to bring a form for you to fill out for this patient.  States you were given information before that was incomplete.  Would you have a few minutes to talk to Stone about form/process?  This type of testing is going to be done more frequently throughout [place].  Patient coming in Monday for lab.    Thanks, Zuleima Bernard RN

## 2018-06-15 NOTE — TELEPHONE ENCOUNTER
Reason for Call:  Patient Information    Detailed comments: Stone from [facility] stopped in to ask Dr. Reynolds to refer pt for blood draw at the [facility] lab instead of [place]. States it's causing paperwork difficulties for [facility]. Pt has appt scheduled at [place] on Monday 18th 10am. Would like to speak with Dr. Reynolds before then.     Phone Number Patient can be reached at: Home number on file [phone] (home)    [facility] [phone]    Best Time: any    Can we leave a detailed message on this number? YES    Call taken on 6/15/2018 at 9:38 AM by Betito Villafuerte

## 2018-06-15 NOTE — TELEPHONE ENCOUNTER
Spoke with Stone from [employer].  Lab that does genetic testing.    Patient needs this lab done and Stone is hoping can be done here at [facility].  States she would have to come to talk to lab staff and nurses about process/ordering etc.    Advised she contact [facility] to discuss.  Zuleima Bernard RN

## 2018-06-17 NOTE — TELEPHONE ENCOUNTER
Please let her known that the final ultrasound report does show a small subchorionic hemorrhage the size we discussed (1.7 x 0.8 x 2.7 cm).  This measurement is smaller than the prior ultrasound's, so it is most likely that this area is re-absorbing.      Unless Dr. Reynolds has other recommendations, I'd recommend that she come in for weekly auscultation of FHTs in the office until about 14 weeks.  If she has any bleeding beyond just a little spotting, she should let Dr. Reynolds know.

## 2018-06-18 ENCOUNTER — MYC MEDICAL ADVICE (OUTPATIENT)
Dept: OBGYN | Facility: CLINIC | Age: 28
End: 2018-06-18

## 2018-06-18 ENCOUNTER — TELEPHONE (OUTPATIENT)
Dept: FAMILY MEDICINE | Facility: CLINIC | Age: 28
End: 2018-06-18

## 2018-06-18 DIAGNOSIS — O09.90 HIGH-RISK PREGNANCY, UNSPECIFIED TRIMESTER: ICD-10-CM

## 2018-06-18 PROCEDURE — 36415 COLL VENOUS BLD VENIPUNCTURE: CPT | Performed by: OBSTETRICS & GYNECOLOGY

## 2018-06-18 PROCEDURE — 40000791 ZZHCL STATISTIC VERIFI PRENATAL TRISOMY 21,18,13: Mod: 90 | Performed by: OBSTETRICS & GYNECOLOGY

## 2018-06-18 PROCEDURE — 99000 SPECIMEN HANDLING OFFICE-LAB: CPT | Performed by: OBSTETRICS & GYNECOLOGY

## 2018-06-18 NOTE — TELEPHONE ENCOUNTER
"PN  Patieant also called today.  Wanting to know actual size of fetus  I was told to f/u up in 12 months.  Does this mean repeat US?  Follow up with OB or you?  She is [facility]. Hast future OV with OB 7/16.  \"I've been told I'm high risk. Should I be seen sooner\"  \"I'm not sure what I am suppose to do now\"    Patient aware you are out of office today.  Anxious to hear back.    Thanks, Zuleima Bernard RN      "

## 2018-06-18 NOTE — TELEPHONE ENCOUNTER
Reason for Call:  Other call back    Detailed comments: Pt is switching her prenatal care to the [facility] and wanted to discuss with Rodolfo what information she would need to tell her new clinic, including information about a hematoma that Rodolfo is following.     Phone Number Patient can be reached at: Home number on file [phone] (home)    Best Time: anytime     Can we leave a detailed message on this number? YES    Call taken on 6/18/2018 at 1:00 PM by Yaima Contreras

## 2018-06-18 NOTE — TELEPHONE ENCOUNTER
New provider should have access to her chart/information.  Will call patient to let her know.  Zuleima Bernard RN

## 2018-06-18 NOTE — MR AVS SNAPSHOT
After Visit Summary   6/18/2018    Amina Grmaajo    MRN: [id number]           Patient Information     Date Of Birth          1990        Visit Information        Provider Department      6/18/2018 10:30 AM WE LAB [facility]        Today's Diagnoses     High-risk pregnancy, unspecified trimester           Follow-ups after your visit        Your next 10 appointments already scheduled     Jul 16, 2018 10:40 AM CDT   ESTABLISHED PRENATAL with Bita Marcelino MD   [facility] ([facility])    [address] [place] [id number] [phone]              Who to contact     If you have questions or need follow up information about today's clinic visit or your schedule please contact [facility] directly at [phone].  Normal or non-critical lab and imaging results will be communicated to you by MyChart, letter or phone within 4 business days after the clinic has received the results. If you do not hear from us within 7 days, please contact the clinic through MyChart or phone. If you have a critical or abnormal lab result, we will notify you by phone as soon as possible.  Submit refill requests through [employer] or call your pharmacy and they will forward the refill request to us. Please allow 3 business days for your refill to be completed.          Additional Information About Your Visit        MyChart Information     [employer] gives you secure access to your electronic health record. If you see a primary care provider, you can also send messages to your care team and make appointments. If you have questions, please call your primary care clinic.  If you do not have a primary care provider, please call [phone] and they will assist you.        Care EveryWhere ID     This is your Care EveryWhere ID. This could be used by other organizations to access your [facility]  records  [id number]        Your Vitals Were     Last Period                   04/04/2018            Blood Pressure from Last 3 Encounters:   06/14/18 127/62   06/13/18 109/71   06/05/18 115/74    Weight from Last 3 Encounters:   06/14/18 164 lb (74.4 kg)   06/13/18 162 lb 3.2 oz (73.6 kg)   06/05/18 162 lb 14.4 oz (73.9 kg)              We Performed the Following     Non Invasive Prenatal Test Cell Free DNA        Primary Care Provider Office Phone # Fax #    Ramila Reynolds [phone] [address] [address]        Equal Access to Services     PINO PITTS : Hadii olamide chavez hadasho Soyves, waaxda luqadaha, qaybta kaalmada adeegyada, fany chapin. So [facility] [phone].    ATENCIÓN: Si habla español, tiene a lin disposición servicios gratuitos de asistencia lingüística. Llame al [phone].    We comply with applicable federal civil rights laws and [place] laws. We do not discriminate on the basis of race, color, national origin, age, disability, sex, sexual orientation, or gender identity.            Thank you!     Thank you for choosing [facility]  for your care. Our goal is always to provide you with excellent care. Hearing back from our patients is one way we can continue to improve our services. Please take a few minutes to complete the written survey that you may receive in the mail after your visit with us. Thank you!             Your Updated Medication List - Protect others around you: Learn how to safely use, store and throw away your medicines at www.disposemymeds.org.          This list is accurate as of 6/18/18 10:48 AM.  Always use your most recent med list.                   Brand Name Dispense Instructions for use Diagnosis    escitalopram 10 MG tablet    LEXAPRO    45 tablet    TAKE 1/2  TABLET (5 MG) BY MOUTH DAILY    Generalized anxiety disorder       PRENATAL PO           VITAMIN B6 PO

## 2018-06-18 NOTE — TELEPHONE ENCOUNTER
PN  Please see message below.  Triage can call patient.  Any other recommendations?  Thanks, Zuleima Bernard RN

## 2018-06-19 ENCOUNTER — MYC MEDICAL ADVICE (OUTPATIENT)
Dept: OBGYN | Facility: CLINIC | Age: 28
End: 2018-06-19

## 2018-06-19 DIAGNOSIS — O46.8X9 SUBCHORIONIC HEMORRHAGE: Primary | ICD-10-CM

## 2018-06-19 NOTE — TELEPHONE ENCOUNTER
Routing pt's MyChart msg below to Dr. Marcelino to review and advise on if pt needs 12 week OB f/u for her RORO or can stick with 14 week visit. Thank you!    RORO size and dates:  5/22/18: Subchorionic hemorrhage: 1.9 x 0.9 x 0.8 cm in the left uterus.  6/8/18: Subchorionic hemorrhage: 2.7 x 4.4 x 1.0 cm along the right lower  margin of the gestational sac. (enlarged)  6/12/18: *Other Findings: anechoic area noted superior to sac 1.7 x 0.8 x 2.7 cm   Possible subchorionic hemorrhage

## 2018-06-19 NOTE — TELEPHONE ENCOUNTER
Reason for Call:  Other appointment    Detailed comments: please call pt back to fit in dr. Reynolds schedule as she is suppose to be having weekly appt with her but no openings on her schedule. MYC msg reflects this as well    Phone Number Patient can be reached at: [phone]  Best Time: anytime    Can we leave a detailed message on this number? YES    Call taken on 6/19/2018 at 8:42 AM by Christy Jovel

## 2018-06-19 NOTE — TELEPHONE ENCOUNTER
Per Dr. Wilcox:  Please let her known that the final ultrasound report does show a small subchorionic hemorrhage the size we discussed (1.7 x 0.8 x 2.7 cm).  This measurement is smaller than the prior ultrasound's, so it is most likely that this area is re-absorbing.       Unless Dr. Reynolds has other recommendations, I'd recommend that she come in for weekly auscultation of FHTs in the office until about 14 weeks.  If she has any bleeding beyond just a little spotting, she should let Dr. Reynolds know.

## 2018-06-19 NOTE — TELEPHONE ENCOUNTER
Order for US entered.           Millie Huyhn   Thanks for reaching out. You can definitely come in sooner than 14 weeks for your new patient visit. I will say that since there has been a continued fetal heart beat, things are reassuring. Also, it appears to be decreasing which is the norm. A lot of my patients have subchorionic hemorrhages and do just fine especially if there is no bleeding. You can come in 2 weeks from your prior sonogram. Just call us at  to set up a new OB visit with an ultrasound.   See you soon!   Best   Bita Marcelino MD

## 2018-06-20 ENCOUNTER — OFFICE VISIT (OUTPATIENT)
Dept: FAMILY MEDICINE | Facility: CLINIC | Age: 28
End: 2018-06-20
Payer: COMMERCIAL

## 2018-06-20 VITALS
BODY MASS INDEX: 25.51 KG/M2 | WEIGHT: 162.5 LBS | DIASTOLIC BLOOD PRESSURE: 75 MMHG | HEIGHT: 67 IN | TEMPERATURE: 97.9 F | OXYGEN SATURATION: 98 % | HEART RATE: 60 BPM | SYSTOLIC BLOOD PRESSURE: 114 MMHG

## 2018-06-20 DIAGNOSIS — O09.90 HIGH-RISK PREGNANCY, UNSPECIFIED TRIMESTER: Primary | ICD-10-CM

## 2018-06-20 PROCEDURE — 99207 ZZC PRENATAL VISIT: CPT | Performed by: FAMILY MEDICINE

## 2018-06-20 NOTE — MR AVS SNAPSHOT
After Visit Summary   6/20/2018    Amina Gramajo    MRN: [id number]           Patient Information     Date Of Birth          1990        Visit Information        Provider Department      6/20/2018 3:00 PM Ramila Reynolds DO [facility]        Today's Diagnoses     High-risk pregnancy, unspecified trimester    -  1    Subchorionic hemorrhage of placenta in first trimester, single or unspecified fetus           Follow-ups after your visit        Your next 10 appointments already scheduled     Jun 27, 2018  3:15 PM CDT   ESTABLISHED PRENATAL with Kayley Reyes MD   [facility] ([facility])    [address]  [facility] [address] [phone]            Jul 02, 2018  8:00 AM CDT   US PELVIC COMPLETE W TRANSVAGINAL with WEUS1   [facility] ([facility])    [address] [place] [address] [phone]           Please bring a list of your medicines (including vitamins, minerals and over-the-counter drugs). Also, tell your doctor about any allergies you may have. Wear comfortable clothes and leave your valuables at home.  Adults: Drink six 8-ounce glasses of fluid one hour before your exam. Do NOT empty your bladder.  If you need to empty your bladder before your exam, try to release only a little bit of urine. Then, drink another 8oz glass of fluid.  Children: Children who are potty trained should drink at least 4 cups (32 oz) of liquid 45 minutes to one hour prior to the exam. The child s bladder must be full in order to achieve a diagnostic exam. If your child is very uncomfortable or has an urgent need to pee, please notify a technologist; they will try to find out how much longer the wait may be and provide instructions to help relieve the pressure. Occasionally it is medically necessary to insert a urinary catheter to fill the bladder.  Please call the Imaging  Department at your exam site with any questions.            Jul 02, 2018  9:00 AM CDT   ESTABLISHED PRENATAL with Bita Marcelino MD   [facility] [place] ([facility] [place])    [address]  [place] [address]   [phone]            Jul 16, 2018 10:40 AM CDT   ESTABLISHED PRENATAL with Bita Marcelino MD   [facility] [place] ([facility] [place])    [address]  [place] [address]   [phone]              Future tests that were ordered for you today     Open Future Orders        Priority Expected Expires Ordered    US OB < 14 Weeks w Transvaginal Routine 6/26/2018 6/19/2019 6/19/2018            Who to contact     If you have questions or need follow up information about today's clinic visit or your schedule please contact [facility] directly at [phone].  Normal or non-critical lab and imaging results will be communicated to you by GigaTrusthart, letter or phone within 4 business days after the clinic has received the results. If you do not hear from us within 7 days, please contact the clinic through Beamz Interactivet or phone. If you have a critical or abnormal lab result, we will notify you by phone as soon as possible.  Submit refill requests through SpinUtopia or call your pharmacy and they will forward the refill request to us. Please allow 3 business days for your refill to be completed.          Additional Information About Your Visit        SpinUtopia Information     SpinUtopia gives you secure access to your electronic health record. If you see a primary care provider, you can also send messages to your care team and make appointments. If you have questions, please call your primary care clinic.  If you do not have a primary care provider, please call [phone] and they will assist you.        Care EveryWhere ID     This is your Care EveryWhere ID. This could be used by other  "organizations to access your [place] medical records  [id number]        Your Vitals Were     Pulse Temperature Height Last Period Pulse Oximetry BMI (Body Mass Index)    60 97.9  F (36.6  C) (Oral) 5' 6.5\" (1.689 m) 04/04/2018 98% 25.84 kg/m2       Blood Pressure from Last 3 Encounters:   06/20/18 114/75   06/14/18 127/62   06/13/18 109/71    Weight from Last 3 Encounters:   06/20/18 162 lb 8 oz (73.7 kg)   06/14/18 164 lb (74.4 kg)   06/13/18 162 lb 3.2 oz (73.6 kg)              Today, you had the following     No orders found for display       Primary Care Provider Office Phone # Fax #    Ramila RODRIGUEZ DO [facility] [phone] [address] [address]        Equal Access to Services     [facility] CenterDAVID : Hadii aad ku hadasho Sopepeali, waaxda luqadaha, qaybta kaalmada adeegyada, fany gaspar . So [facility] [phone].    ATENCIÓN: Si junla esptomas, tiene a lin disposición servicios gratuitos de asistencia lingüística. Llame al [phone].    We comply with applicable federal civil rights laws and [place] laws. We do not discriminate on the basis of race, color, national origin, age, disability, sex, sexual orientation, or gender identity.            Thank you!     Thank you for choosing [facility]  for your care. Our goal is always to provide you with excellent care. Hearing back from our patients is one way we can continue to improve our services. Please take a few minutes to complete the written survey that you may receive in the mail after your visit with us. Thank you!             Your Updated Medication List - Protect others around you: Learn how to safely use, store and throw away your medicines at www.disposemymeds.org.          This list is accurate as of 6/20/18  5:38 PM.  Always use your most recent med list.                   Brand Name Dispense Instructions for use Diagnosis    escitalopram 10 MG tablet    LEXAPRO    45 tablet    " TAKE 1/2  TABLET (5 MG) BY MOUTH DAILY    Generalized anxiety disorder       PRENATAL PO           VITAMIN B6 PO

## 2018-06-20 NOTE — PROGRESS NOTES
11w0d   Patient is here today for a fetal heart tone check.  Recently patient was diagnosed with a subchorionic hemorrhage which had initially increased in size however her most recent scan shows slight decrease in size with possible reabsorption.  Fetal heart tones today were good and recommend weekly checks until 14 weeks.  Plan to have her see another provider here at [facility] upw next week and then she will establish care with a obstetrician during week 13.  PN

## 2018-06-20 NOTE — NURSING NOTE
"Chief Complaint   Patient presents with     Prenatal Care     /75  Pulse 60  Temp 97.9  F (36.6  C) (Oral)  Ht 5' 6.5\" (1.689 m)  Wt 162 lb 8 oz (73.7 kg)  LMP 04/04/2018  SpO2 98%  BMI 25.84 kg/m2 Estimated body mass index is 25.84 kg/(m^2) as calculated from the following:    Height as of this encounter: 5' 6.5\" (1.689 m).    Weight as of this encounter: 162 lb 8 oz (73.7 kg).  Medication Reconciliation: complete      Health Maintenance that is potentially due pending provider review:  NONE    n/a    GAURAV Parr  "

## 2018-06-27 ENCOUNTER — PRENATAL OFFICE VISIT (OUTPATIENT)
Dept: FAMILY MEDICINE | Facility: CLINIC | Age: 28
End: 2018-06-27
Payer: COMMERCIAL

## 2018-06-27 VITALS
OXYGEN SATURATION: 96 % | HEIGHT: 67 IN | RESPIRATION RATE: 14 BRPM | BODY MASS INDEX: 25.11 KG/M2 | SYSTOLIC BLOOD PRESSURE: 105 MMHG | TEMPERATURE: 98.9 F | DIASTOLIC BLOOD PRESSURE: 65 MMHG | WEIGHT: 160 LBS | HEART RATE: 70 BPM

## 2018-06-27 DIAGNOSIS — O09.90 HIGH-RISK PREGNANCY, UNSPECIFIED TRIMESTER: Primary | ICD-10-CM

## 2018-06-27 PROCEDURE — 99207 ZZC PRENATAL VISIT: CPT | Performed by: FAMILY MEDICINE

## 2018-06-27 NOTE — NURSING NOTE
"Chief Complaint   Patient presents with     Prenatal Care       Initial LMP 04/04/2018 Estimated body mass index is 25.84 kg/(m^2) as calculated from the following:    Height as of 6/20/18: 5' 6.5\" (1.689 m).    Weight as of 6/20/18: 162 lb 8 oz (73.7 kg).  BP completed using cuff size: regular    Health Maintenance that is potentially due pending provider review:  Health Maintenance Due   Topic Date Due     MATERNAL SCREENING  07/18/2018           Per provider  "

## 2018-06-27 NOTE — MR AVS SNAPSHOT
After Visit Summary   6/27/2018    Amina Gramajo    MRN: [id number]           Patient Information     Date Of Birth          1990        Visit Information        Provider Department      6/27/2018 3:15 PM Kayley Reyes MD [facility]        Today's Diagnoses     High-risk pregnancy, unspecified trimester    -  1    Subchorionic hemorrhage of placenta in first trimester, single or unspecified fetus           Follow-ups after your visit        Your next 10 appointments already scheduled     Jul 02, 2018  8:00 AM CDT   US PELVIC COMPLETE W TRANSVAGINAL with WEUS1   [facility] [place] ([facility] [place])    [address] [place] [address] [phone]           Please bring a list of your medicines (including vitamins, minerals and over-the-counter drugs). Also, tell your doctor about any allergies you may have. Wear comfortable clothes and leave your valuables at home.  Adults: Drink six 8-ounce glasses of fluid one hour before your exam. Do NOT empty your bladder.  If you need to empty your bladder before your exam, try to release only a little bit of urine. Then, drink another 8oz glass of fluid.  Children: Children who are potty trained should drink at least 4 cups (32 oz) of liquid 45 minutes to one hour prior to the exam. The child s bladder must be full in order to achieve a diagnostic exam. If your child is very uncomfortable or has an urgent need to pee, please notify a technologist; they will try to find out how much longer the wait may be and provide instructions to help relieve the pressure. Occasionally it is medically necessary to insert a urinary catheter to fill the bladder.  Please call the Imaging Department at your exam site with any questions.            Jul 02, 2018  9:00 AM CDT   ESTABLISHED PRENATAL with Bita Marcelino MD   [facility] [place] ([facility]  [place]    [address]  [place] [address] [phone]            Jul 16, 2018 10:40 AM CDT   ESTABLISHED PRENATAL with Bita Marcelino MD   [facility] ([facility] [place])    [address]  [place] [address] [phone]              Who to contact     If you have questions or need follow up information about today's clinic visit or your schedule please contact [facility] directly at [phone].  Normal or non-critical lab and imaging results will be communicated to you by Loccit (ML4D)hart, letter or phone within 4 business days after the clinic has received the results. If you do not hear from us within 7 days, please contact the clinic through [email or web address] or phone. If you have a critical or abnormal lab result, we will notify you by phone as soon as possible.  Submit refill requests through Appolicious or call your pharmacy and they will forward the refill request to us. Please allow 3 business days for your refill to be completed.          Additional Information About Your Visit        Loccit (ML4D)hart Information     Appolicious gives you secure access to your electronic health record. If you see a primary care provider, you can also send messages to your care team and make appointments. If you have questions, please call your primary care clinic.  If you do not have a primary care provider, please call [phone] and they will assist you.        Care EveryWhere ID     This is your Care EveryWhere ID. This could be used by other organizations to access your [place] medical records  [id number]        Your Vitals Were     Pulse Temperature Respirations Height Last Period Pulse Oximetry    70 98.9  F (37.2  C) (Oral) 14 5' 6.5\" (1.689 m) 04/04/2018 96%    BMI (Body Mass Index)                   25.44 kg/m2            Blood Pressure from Last 3 Encounters:   06/27/18 105/65   06/20/18 114/75   06/14/18 127/62    Weight from Last " 3 Encounters:   06/27/18 160 lb (72.6 kg)   06/20/18 162 lb 8 oz (73.7 kg)   06/14/18 164 lb (74.4 kg)              Today, you had the following     No orders found for display       Primary Care Provider Office Phone # Fax #    Ramila Reynolds [phone]       [address] [address]        Equal Access to Services     DILEEP PITTS : Hadii aad ku hadasho Soomaali, waaxda luqadaha, qaybta kaalmada adeegyada, waxay idiin hayaan adeeg kharash la'sidra . So [facility] [phone].    ATENCIÓN: Si habla español, tiene a lin disposición servicios gratuitos de asistencia lingüística. Llame al [phone].    We comply with applicable federal civil rights laws and [place] laws. We do not discriminate on the basis of race, color, national origin, age, disability, sex, sexual orientation, or gender identity.            Thank you!     Thank you for choosing [facility]  for your care. Our goal is always to provide you with excellent care. Hearing back from our patients is one way we can continue to improve our services. Please take a few minutes to complete the written survey that you may receive in the mail after your visit with us. Thank you!             Your Updated Medication List - Protect others around you: Learn how to safely use, store and throw away your medicines at www.disposemymeds.org.          This list is accurate as of 6/27/18  3:50 PM.  Always use your most recent med list.                   Brand Name Dispense Instructions for use Diagnosis    PRENATAL PO           UNISOM 25 MG Tabs tablet   Generic drug:  doxylamine      Take 25 mg by mouth At Bedtime        VITAMIN B6 PO

## 2018-06-27 NOTE — PROGRESS NOTES
"  SUBJECTIVE:   Amina Gramajo is a 27 year old female who presents to clinic today for the following health issues:      Prenatal care  LMP 4-4-18  Per U/S SALONI 1-10-19    Fees well no bleeding noted  Has RORO which has doubled - following this pregnancy closely    Has 2 children - 1  4 years ago  2nd was Breech c/s 19 months ago    Problem list and histories reviewed & adjusted, as indicated.  Additional history: as documented    Patient Active Problem List   Diagnosis     Generalized anxiety disorder     History of colposcopy     High-risk pregnancy, unspecified trimester     Subchorionic hemorrhage of placenta in first trimester, single or unspecified fetus     Past Surgical History:   Procedure Laterality Date     APPENDECTOMY       ORTHOPEDIC SURGERY      wrist surgey       Social History   Substance Use Topics     Smoking status: Never Smoker     Smokeless tobacco: Never Used     Alcohol use Yes      Comment: few times a week-none while pregnant     Family History   Problem Relation Age of Onset     Depression Mother      Depression Maternal Grandmother      Dementia Maternal Grandmother      Depression Sister            Reviewed and updated as needed this visit by clinical staff  Tobacco  Allergies  Meds  Med Hx  Surg Hx  Fam Hx  Soc Hx      Reviewed and updated as needed this visit by Provider         ROS:  Constitutional, HEENT, cardiovascular, pulmonary, gi and gu systems are negative, except as otherwise noted.    OBJECTIVE:                                                    /65  Pulse 70  Temp 98.9  F (37.2  C) (Oral)  Resp 14  Ht 5' 6.5\" (1.689 m)  Wt 160 lb (72.6 kg)  LMP 2018  SpO2 96%  BMI 25.44 kg/m2  Body mass index is 25.44 kg/(m^2).  GENERAL APPEARANCE: healthy, alert and no distress  ABDOMEN: soft, nontender, without hepatosplenomegaly or masses, bowel sounds normal and FHT          ASSESSMENT/PLAN:                                                  "   Supervision high risk pregnancy  12 WGA  Good heart tones noted today  No bleeding noted  Has US Monday for surveillance    Follow up with Provider - monday     Kayley Reyes MD  [facility]

## 2018-07-02 ENCOUNTER — RADIANT APPOINTMENT (OUTPATIENT)
Dept: ULTRASOUND IMAGING | Facility: CLINIC | Age: 28
End: 2018-07-02
Payer: COMMERCIAL

## 2018-07-02 ENCOUNTER — PRENATAL OFFICE VISIT (OUTPATIENT)
Dept: OBGYN | Facility: CLINIC | Age: 28
End: 2018-07-02
Payer: COMMERCIAL

## 2018-07-02 VITALS — BODY MASS INDEX: 25.91 KG/M2 | SYSTOLIC BLOOD PRESSURE: 106 MMHG | DIASTOLIC BLOOD PRESSURE: 60 MMHG | WEIGHT: 163 LBS

## 2018-07-02 DIAGNOSIS — O09.299 HISTORY OF MACROSOMIA IN INFANT IN PRIOR PREGNANCY, CURRENTLY PREGNANT: Primary | ICD-10-CM

## 2018-07-02 DIAGNOSIS — O46.8X9 SUBCHORIONIC HEMORRHAGE: ICD-10-CM

## 2018-07-02 LAB — GLUCOSE 1H P 50 G GLC PO SERPL-MCNC: 81 MG/DL (ref 60–129)

## 2018-07-02 PROCEDURE — 76801 OB US < 14 WKS SINGLE FETUS: CPT | Performed by: OBSTETRICS & GYNECOLOGY

## 2018-07-02 PROCEDURE — 82950 GLUCOSE TEST: CPT | Performed by: OBSTETRICS & GYNECOLOGY

## 2018-07-02 PROCEDURE — 99207 ZZC PRENATAL VISIT: CPT | Performed by: OBSTETRICS & GYNECOLOGY

## 2018-07-02 PROCEDURE — 36415 COLL VENOUS BLD VENIPUNCTURE: CPT | Performed by: OBSTETRICS & GYNECOLOGY

## 2018-07-02 NOTE — MR AVS SNAPSHOT
After Visit Summary   7/2/2018    Amina Gramajo    MRN: [id number]           Patient Information     Date Of Birth          1990        Visit Information        Provider Department      7/2/2018 9:00 AM Bita Marcelino MD [facility]        Today's Diagnoses     History of macrosomia in infant in prior pregnancy, currently pregnant    -  1       Follow-ups after your visit        Your next 10 appointments already scheduled     Jul 30, 2018 10:00 AM CDT   ESTABLISHED PRENATAL with Bita Marcelino MD   [facility] [place] ([facility])    [address] [place] [address] [address]  [facility] [id number] [phone]              Who to contact     If you have questions or need follow up information about today's clinic visit or your schedule please contact [facility] directly at [phone].  Normal or non-critical lab and imaging results will be communicated to you by MoPixhart, letter or phone within 4 business days after the clinic has received the results. If you do not hear from us within 7 days, please contact the clinic through MoPixhart or phone. If you have a critical or abnormal lab result, we will notify you by phone as soon as possible.  Submit refill requests through Critical Outcome Technologies or call your pharmacy and they will forward the refill request to us. Please allow 3 business days for your refill to be completed.          Additional Information About Your Visit        MoPixhart Information     Critical Outcome Technologies gives you secure access to your electronic health record. If you see a primary care provider, you can also send messages to your care team and make appointments. If you have questions, please call your primary care clinic.  If you do not have a primary care provider, please call [phone] and they will assist you.        Care EveryWhere ID     This is your Care EveryWhere ID. This could be used by other  organizations to access your [place] medical records  [id number]        Your Vitals Were     Last Period Breastfeeding? BMI (Body Mass Index)             04/04/2018 No 25.91 kg/m2          Blood Pressure from Last 3 Encounters:   07/02/18 106/60   06/27/18 105/65   06/20/18 114/75    Weight from Last 3 Encounters:   07/02/18 163 lb (73.9 kg)   06/27/18 160 lb (72.6 kg)   06/20/18 162 lb 8 oz (73.7 kg)              We Performed the Following     Glucose tolerance gest screen 1 hour        Primary Care Provider Office Phone # Fax #    Ramila RODRIGUEZ [facility], [phone] [address] [facility] [address] [address]        Equal Access to Services     PINO PITTS : Hadii aad ku hadasho Soyves, waaxda luqadaha, qaybta kaalmada adeegyada, fany chapin. So [facility] [phone].    ATENCIÓN: Si habla español, tiene a lin disposición servicios gratuitos de asistencia lingüística. Llame al [phone].    We comply with applicable federal civil rights laws and [place] laws. We do not discriminate on the basis of race, color, national origin, age, disability, sex, sexual orientation, or gender identity.            Thank you!     Thank you for choosing [facility]  for your care. Our goal is always to provide you with excellent care. Hearing back from our patients is one way we can continue to improve our services. Please take a few minutes to complete the written survey that you may receive in the mail after your visit with us. Thank you!             Your Updated Medication List - Protect others around you: Learn how to safely use, store and throw away your medicines at www.disposemymeds.org.          This list is accurate as of 7/2/18  9:58 AM.  Always use your most recent med list.                   Brand Name Dispense Instructions for use Diagnosis    PRENATAL PO           UNISOM 25 MG Tabs tablet   Generic drug:  doxylamine      Take 25 mg by mouth At  Bedtime        VITAMIN B6 PO

## 2018-07-02 NOTE — PROGRESS NOTES
Clinical Risk Assessment for Preeclampsia from The American Congress of Obstetricians and Gynecologists (Committee Opinion #743):    High Risk for Preeclampsia: Low Dose ASA to begin at 12 weeks.    History of preeclampsia    Multifetal Gestation    Chronic Hypertension    Type 1 or Type 2 Diabetes    Renal Disease    Autoimmune Disease    Moderate Risk for Preeclampsia: Low Dose ASA to begin at 12 weeks if more than one present.    Nulliparity    Obesity (BMI>30)    Family History of Preeclampsia (mother or sister)    Sociodemographic Characteristics (, Low SES)    Age 35 years or older    Personal Risk factors ( Low birth weight, SGA, Adverse pregnancy outcome, 10 year pregnancy interval)    Low risk for preeclampsia. Does not need ASA.    No vaginal bleeding. Discussed TOLAC and desires this. No more RORO. Reassurance given. Had full exam in May and new OB visit with PCP this month. Has not yet had nurse visit.    Bita Marcelino MD

## 2018-07-06 ENCOUNTER — TELEPHONE (OUTPATIENT)
Dept: NURSING | Facility: CLINIC | Age: 28
End: 2018-07-06

## 2018-07-06 NOTE — TELEPHONE ENCOUNTER
Pt called back right away- she was informed of the test results but does not want to know gender.      LMTCB    Innatal results: negative  Test    Result     Interpretation  Chromosome 21  No aneuploidy detected     Results consistent with two copies of chromosome 21  Chromosome 18  No aneuploidy detected  Results consistent with two copies of chromosome 18  Chromosome 13  No aneuploidy detected  Results consistent with two copies of chromosome 13  Sex Chromosome  No aneuploidy detected  Results consistent with two sex chromosomes (XY Male)      Not sure if pt has been notified of results- can not find documentation.- [place] patient-

## 2018-07-09 ENCOUNTER — TELEPHONE (OUTPATIENT)
Dept: FAMILY MEDICINE | Facility: CLINIC | Age: 28
End: 2018-07-09

## 2018-07-09 NOTE — TELEPHONE ENCOUNTER
Received form(s) from Pt for provider signature.  Placed form(s) in/on PN's desk.  Forms need to be signed and faxed to [phone]..    Call pt to verify form was sent: Yes  Copy needs to be sent for scanning after completion: Yes

## 2018-07-10 LAB — NON INVASIVE PRENATAL TEST CELL FREE DNA: NORMAL

## 2018-07-11 NOTE — PROGRESS NOTES
Dear Amina,   Your test results are all back -   The blood test was normal/negative for any trisomy which is great news!  Let us know if you have any questions.  -Ramila Reynolds, DO

## 2018-07-20 ENCOUNTER — TELEPHONE (OUTPATIENT)
Dept: FAMILY MEDICINE | Facility: CLINIC | Age: 28
End: 2018-07-20

## 2018-07-20 NOTE — TELEPHONE ENCOUNTER
Placed form in the tower -   Will hold off on completing at this time -   Ordered already run at [facility]  PN

## 2018-07-30 ENCOUNTER — PRENATAL OFFICE VISIT (OUTPATIENT)
Dept: OBGYN | Facility: CLINIC | Age: 28
End: 2018-07-30
Payer: COMMERCIAL

## 2018-07-30 VITALS — BODY MASS INDEX: 25.91 KG/M2 | SYSTOLIC BLOOD PRESSURE: 118 MMHG | DIASTOLIC BLOOD PRESSURE: 74 MMHG | WEIGHT: 163 LBS

## 2018-07-30 DIAGNOSIS — O09.90 HIGH-RISK PREGNANCY, UNSPECIFIED TRIMESTER: Primary | ICD-10-CM

## 2018-07-30 PROCEDURE — 99207 ZZC PRENATAL VISIT: CPT | Performed by: OBSTETRICS & GYNECOLOGY

## 2018-07-30 PROCEDURE — 82105 ALPHA-FETOPROTEIN SERUM: CPT | Mod: 90 | Performed by: OBSTETRICS & GYNECOLOGY

## 2018-07-30 PROCEDURE — 99000 SPECIMEN HANDLING OFFICE-LAB: CPT | Performed by: OBSTETRICS & GYNECOLOGY

## 2018-07-30 PROCEDURE — 36415 COLL VENOUS BLD VENIPUNCTURE: CPT | Performed by: OBSTETRICS & GYNECOLOGY

## 2018-07-30 NOTE — MR AVS SNAPSHOT
After Visit Summary   7/30/2018    Amina Gramajo    MRN: [id number]           Patient Information     Date Of Birth          1990        Visit Information        Provider Department      7/30/2018 10:00 AM Bita Marcelino MD [facility]        Today's Diagnoses     High-risk pregnancy, second trimester    -  1       Follow-ups after your visit        Follow-up notes from your care team     Return in about 4 weeks (around 8/27/2018).      Future tests that were ordered for you today     Open Future Orders        Priority Expected Expires Ordered    US OB > 14 Weeks Complete Single Routine  7/30/2019 7/30/2018            Who to contact     If you have questions or need follow up information about today's clinic visit or your schedule please contact [facility] directly at [phone].  Normal or non-critical lab and imaging results will be communicated to you by MyChart, letter or phone within 4 business days after the clinic has received the results. If you do not hear from us within 7 days, please contact the clinic through Celsiashart or phone. If you have a critical or abnormal lab result, we will notify you by phone as soon as possible.  Submit refill requests through Cannonball or call your pharmacy and they will forward the refill request to us. Please allow 3 business days for your refill to be completed.          Additional Information About Your Visit        MyChart Information     Cannonball gives you secure access to your electronic health record. If you see a primary care provider, you can also send messages to your care team and make appointments. If you have questions, please call your primary care clinic.  If you do not have a primary care provider, please call [phone] and they will assist you.        Care EveryWhere ID     This is your Care EveryWhere ID. This could be used by other organizations to access your [facility]  records  [id number]        Your Vitals Were     Last Period Breastfeeding? BMI (Body Mass Index)             04/04/2018 No 25.91 kg/m2          Blood Pressure from Last 3 Encounters:   07/30/18 118/74   07/02/18 106/60   06/27/18 105/65    Weight from Last 3 Encounters:   07/30/18 163 lb (73.9 kg)   07/02/18 163 lb (73.9 kg)   06/27/18 160 lb (72.6 kg)              We Performed the Following     Alpha fetoprotein maternal screen        Primary Care Provider Office Phone # Fax #    Ramila Reynolds [phone] [address] [address]        Equal Access to Services     PINO PITTS : Hadii olamide blando Soyves, waaxda luqadaha, qaybta kaalmada adeegyada, fany chapin. So [facility] [phone].    ATENCIÓN: Si habla español, tiene a lin disposición servicios gratuitos de asistencia lingüística. Llame al [phone].    We comply with applicable federal civil rights laws and [place] laws. We do not discriminate on the basis of race, color, national origin, age, disability, sex, sexual orientation, or gender identity.            Thank you!     Thank you for choosing [facility]  for your care. Our goal is always to provide you with excellent care. Hearing back from our patients is one way we can continue to improve our services. Please take a few minutes to complete the written survey that you may receive in the mail after your visit with us. Thank you!             Your Updated Medication List - Protect others around you: Learn how to safely use, store and throw away your medicines at www.disposemymeds.org.          This list is accurate as of 7/30/18 10:32 AM.  Always use your most recent med list.                   Brand Name Dispense Instructions for use Diagnosis    PRENATAL PO           UNISOM 25 MG Tabs tablet   Generic drug:  doxylamine      Take 25 mg by mouth At Bedtime

## 2018-08-01 LAB
# FETUSES US: NORMAL
# FETUSES: 1
AFP ADJ MOM AMN: 0.82
AFP SERPL-MCNC: 28 NG/ML
AGE - REPORTED: 28 YR
CURRENT SMOKER: NO
CURRENT SMOKER: NO
DIABETES STATUS PATIENT: NO
FAMILY MEMBER DISEASES HX: NO
FAMILY MEMBER DISEASES HX: NO
GA METHOD: NORMAL
GA METHOD: NORMAL
GA: NORMAL WK
IDDM PATIENT QL: NO
INTEGRATED SCN PATIENT-IMP: NORMAL
LMP START DATE: NORMAL
MONOCHORIONIC TWINS: NO
SERVICE CMNT-IMP: NO
SPECIMEN DRAWN SERPL: NORMAL
VALPROIC/CARBAMAZEPINE STATUS: NO
WEIGHT UNITS: NORMAL

## 2018-08-13 ENCOUNTER — PRENATAL OFFICE VISIT (OUTPATIENT)
Dept: OBGYN | Facility: CLINIC | Age: 28
End: 2018-08-13
Payer: COMMERCIAL

## 2018-08-13 ENCOUNTER — RADIANT APPOINTMENT (OUTPATIENT)
Dept: ULTRASOUND IMAGING | Facility: CLINIC | Age: 28
End: 2018-08-13
Payer: COMMERCIAL

## 2018-08-13 VITALS — BODY MASS INDEX: 26.23 KG/M2 | SYSTOLIC BLOOD PRESSURE: 114 MMHG | WEIGHT: 165 LBS | DIASTOLIC BLOOD PRESSURE: 70 MMHG

## 2018-08-13 DIAGNOSIS — Z3A.18 18 WEEKS GESTATION OF PREGNANCY: Primary | ICD-10-CM

## 2018-08-13 DIAGNOSIS — O09.90 HIGH-RISK PREGNANCY, UNSPECIFIED TRIMESTER: ICD-10-CM

## 2018-08-13 DIAGNOSIS — O34.219 PREVIOUS CESAREAN DELIVERY, ANTEPARTUM: ICD-10-CM

## 2018-08-13 PROCEDURE — 76805 OB US >/= 14 WKS SNGL FETUS: CPT | Performed by: OBSTETRICS & GYNECOLOGY

## 2018-08-13 PROCEDURE — 99207 ZZC PRENATAL VISIT: CPT | Performed by: OBSTETRICS & GYNECOLOGY

## 2018-08-13 NOTE — PROGRESS NOTES
Discussed normal anatomy sonogram today. Also discussed stopping breastfeeding.  RTC in 4 weeks  Bita Marcelino MD

## 2018-08-13 NOTE — MR AVS SNAPSHOT
After Visit Summary   2018    Amina Gramajo    MRN: [id number]           Patient Information     Date Of Birth          1990        Visit Information        Provider Department      2018 3:50 PM Bita Marcelino MD [facility]        Today's Diagnoses     18 weeks gestation of pregnancy    -  1    Previous  delivery, antepartum           Follow-ups after your visit        Follow-up notes from your care team     Return in about 4 weeks (around 9/10/2018).      Your next 10 appointments already scheduled     Sep 14, 2018 10:00 AM CDT   ESTABLISHED PRENATAL with Bita Marcelino MD   [facility] ([facility])    [address] [place] [address] [address]  [facility] [address] [phone]              Who to contact     If you have questions or need follow up information about today's clinic visit or your schedule please contact [facility] directly at [phone].  Normal or non-critical lab and imaging results will be communicated to you by NUVETAhart, letter or phone within 4 business days after the clinic has received the results. If you do not hear from us within 7 days, please contact the clinic through Good Technologyt or phone. If you have a critical or abnormal lab result, we will notify you by phone as soon as possible.  Submit refill requests through ServiceMax or call your pharmacy and they will forward the refill request to us. Please allow 3 business days for your refill to be completed.          Additional Information About Your Visit        NUVETAhart Information     ServiceMax gives you secure access to your electronic health record. If you see a primary care provider, you can also send messages to your care team and make appointments. If you have questions, please call your primary care clinic.  If you do not have a primary care provider, please call [phone] and they will assist  you.        Care EveryWhere ID     This is your Care EveryWhere ID. This could be used by other organizations to access your [place] medical records  [id number]        Your Vitals Were     Last Period BMI (Body Mass Index)                04/04/2018 26.23 kg/m2           Blood Pressure from Last 3 Encounters:   08/13/18 114/70   07/30/18 118/74   07/02/18 106/60    Weight from Last 3 Encounters:   08/13/18 165 lb (74.8 kg)   07/30/18 163 lb (73.9 kg)   07/02/18 163 lb (73.9 kg)              Today, you had the following     No orders found for display       Primary Care Provider Office Phone # Fax #    Ramila RODRIGUEZ DO [facility] [phone] [address] [address]        Equal Access to Services     PINO PITTS : Hadii olamide chavez hadasho Soyves, waaxda luqadaha, qaybta kaalmada adejoanayaangelic, fany gaspar . [facility] [phone].    ATENCIÓN: Si habla español, tiene a lin disposición servicios gratuitos de asistencia lingüística. Floyd al [phone].    We comply with applicable federal civil rights laws and [place] laws. We do not discriminate on the basis of race, color, national origin, age, disability, sex, sexual orientation, or gender identity.            Thank you!     Thank you for choosing [facility]  for your care. Our goal is always to provide you with excellent care. Hearing back from our patients is one way we can continue to improve our services. Please take a few minutes to complete the written survey that you may receive in the mail after your visit with us. Thank you!             Your Updated Medication List - Protect others around you: Learn how to safely use, store and throw away your medicines at www.disposemymeds.org.          This list is accurate as of 8/13/18  4:13 PM.  Always use your most recent med list.                   Brand Name Dispense Instructions for use Diagnosis    PRENATAL PO           UNISOM 25 MG  Tabs tablet   Generic drug:  doxylamine      Take 25 mg by mouth At Bedtime

## 2018-09-14 ENCOUNTER — PRENATAL OFFICE VISIT (OUTPATIENT)
Dept: OBGYN | Facility: CLINIC | Age: 28
End: 2018-09-14
Payer: COMMERCIAL

## 2018-09-14 VITALS — DIASTOLIC BLOOD PRESSURE: 68 MMHG | WEIGHT: 169 LBS | BODY MASS INDEX: 26.87 KG/M2 | SYSTOLIC BLOOD PRESSURE: 108 MMHG

## 2018-09-14 DIAGNOSIS — O09.292 HISTORY OF MACROSOMIA IN INFANT IN PRIOR PREGNANCY, CURRENTLY PREGNANT, SECOND TRIMESTER: Primary | ICD-10-CM

## 2018-09-14 PROCEDURE — 99207 ZZC PRENATAL VISIT: CPT | Performed by: OBSTETRICS & GYNECOLOGY

## 2018-09-14 NOTE — MR AVS SNAPSHOT
After Visit Summary   9/14/2018    Amina Gramajo    MRN: [id number]           Patient Information     Date Of Birth          1990        Visit Information        Provider Department      9/14/2018 10:00 AM Bita Marcelino MD [facility] [place]         Follow-ups after your visit        Your next 10 appointments already scheduled     Oct 11, 2018 10:00 AM CDT   Glucose Tolerance Test with WE [facility]   [facility] [place] ([facility] [place])    [address]  [place] [address]   [phone]            Oct 11, 2018 10:15 AM CDT   US OB SINGLE FOLLOW UP REPEAT with WEUS1   [facility] [place] ([facility] [place])    [address]  [place] [address]   [phone]           How do I prepare for my exam? (Food and drink instructions) Drink four 8-ounce glasses of fluid an hour before your exam. If you need to empty your bladder before your exam, try to release only a little urine. Then, drink another glass of fluid.  How do I prepare for my exam? (Other instructions) You may have up to two family members in the exam room. If you bring a small child, an adult must be there to care for him or her. No video or camera photography during the procedure.  What should I wear: Wear comfortable clothes.  How long does the exam take: Most ultrasounds take 30 to 60 minutes.  What should I bring: Bring a list of your medicines, including vitamins, minerals and over-the-counter drugs. It is safest to leave personal items at home.  Do I need a :  No  is needed.  What do I need to tell my doctor: Tell your doctor about any allergies you may have.  What should I do after the exam: No restrictions, You may resume normal activities.  What is this test: An ultrasound uses sound waves to make pictures of the body. Sound waves do not cause pain. The only discomfort may be the pressure of  the wand against your skin or full bladder.  Who should I call with questions: If you have any questions, please call the Imaging Department where you will have your exam. Directions, parking instructions, and other information is available on our website, [email or web address].            Oct 11, 2018 10:50 AM CDT   ESTABLISHED PRENATAL with Bita Marcelino MD   [facility] ([facility])    [address] [place] [id number] [phone]              Who to contact     If you have questions or need follow up information about today's clinic visit or your schedule please contact [facility] directly at [phone].  Normal or non-critical lab and imaging results will be communicated to you by MyChart, letter or phone within 4 business days after the clinic has received the results. If you do not hear from us within 7 days, please contact the clinic through MyChart or phone. If you have a critical or abnormal lab result, we will notify you by phone as soon as possible.  Submit refill requests through Jacked or call your pharmacy and they will forward the refill request to us. Please allow 3 business days for your refill to be completed.          Additional Information About Your Visit        Jacked Information     Jacked gives you secure access to your electronic health record. If you see a primary care provider, you can also send messages to your care team and make appointments. If you have questions, please call your primary care clinic.  If you do not have a primary care provider, please call [phone] and they will assist you.        Care EveryWhere ID     This is your Care EveryWhere ID. This could be used by other organizations to access your [place] medical records  [id number]        Your Vitals Were     Last Period BMI (Body Mass Index)                04/04/2018 26.87 kg/m2           Blood Pressure from  Last 3 Encounters:   09/14/18 108/68   08/13/18 114/70   07/30/18 118/74    Weight from Last 3 Encounters:   09/14/18 169 lb (76.7 kg)   08/13/18 165 lb (74.8 kg)   07/30/18 163 lb (73.9 kg)              Today, you had the following     No orders found for display       Primary Care Provider Office Phone # Fax #    Ramila Reynolds [phone] [address] [address]        Equal Access to Services     [facility]: Hadii aad ku hadasho Soomaali, waaxda luqadaha, qaybta kaalmada adejoanayaangelic, fany gaspar . So [facility] [phone].    ATENCIÓN: Si habla español, tiene a lin disposición servicios gratuitos de asistencia lingüística. Llame al [phone].    We comply with applicable federal civil rights laws and [place] laws. We do not discriminate on the basis of race, color, national origin, age, disability, sex, sexual orientation, or gender identity.            Thank you!     Thank you for choosing [facility]  for your care. Our goal is always to provide you with excellent care. Hearing back from our patients is one way we can continue to improve our services. Please take a few minutes to complete the written survey that you may receive in the mail after your visit with us. Thank you!             Your Updated Medication List - Protect others around you: Learn how to safely use, store and throw away your medicines at www.disposemymeds.org.          This list is accurate as of 9/14/18 10:42 AM.  Always use your most recent med list.                   Brand Name Dispense Instructions for use Diagnosis    PRENATAL PO           UNISOM 25 MG Tabs tablet   Generic drug:  doxylamine      Take 25 mg by mouth At Bedtime

## 2018-09-14 NOTE — PROGRESS NOTES
Prenatal visit. Doing well. Will get a growth ultrasound with her next visit.   RTC in 4 weeks.  Bita Marcelino MD

## 2018-10-19 ENCOUNTER — PRENATAL OFFICE VISIT (OUTPATIENT)
Dept: OBGYN | Facility: CLINIC | Age: 28
End: 2018-10-19
Payer: COMMERCIAL

## 2018-10-19 ENCOUNTER — RADIANT APPOINTMENT (OUTPATIENT)
Dept: ULTRASOUND IMAGING | Facility: CLINIC | Age: 28
End: 2018-10-19
Payer: COMMERCIAL

## 2018-10-19 VITALS — BODY MASS INDEX: 27.82 KG/M2 | SYSTOLIC BLOOD PRESSURE: 110 MMHG | WEIGHT: 175 LBS | DIASTOLIC BLOOD PRESSURE: 62 MMHG

## 2018-10-19 DIAGNOSIS — O09.93 HIGH-RISK PREGNANCY, THIRD TRIMESTER: Primary | ICD-10-CM

## 2018-10-19 DIAGNOSIS — Z23 NEED FOR TDAP VACCINATION: ICD-10-CM

## 2018-10-19 DIAGNOSIS — K21.9 GASTROESOPHAGEAL REFLUX DISEASE WITHOUT ESOPHAGITIS: ICD-10-CM

## 2018-10-19 DIAGNOSIS — Z23 NEED FOR PROPHYLACTIC VACCINATION AND INOCULATION AGAINST INFLUENZA: ICD-10-CM

## 2018-10-19 DIAGNOSIS — O09.292 HISTORY OF MACROSOMIA IN INFANT IN PRIOR PREGNANCY, CURRENTLY PREGNANT, SECOND TRIMESTER: ICD-10-CM

## 2018-10-19 DIAGNOSIS — Z36.9 ENCOUNTER FOR ANTENATAL SCREENING OF MOTHER: ICD-10-CM

## 2018-10-19 LAB
ERYTHROCYTE [DISTWIDTH] IN BLOOD BY AUTOMATED COUNT: 12.6 % (ref 10–15)
GLUCOSE 1H P 50 G GLC PO SERPL-MCNC: 85 MG/DL (ref 60–129)
HCT VFR BLD AUTO: 33.6 % (ref 35–47)
HGB BLD-MCNC: 11.5 G/DL (ref 11.7–15.7)
MCH RBC QN AUTO: 30.3 PG (ref 26.5–33)
MCHC RBC AUTO-ENTMCNC: 34.2 G/DL (ref 31.5–36.5)
MCV RBC AUTO: 88 FL (ref 78–100)
PLATELET # BLD AUTO: 231 10E9/L (ref 150–450)
RBC # BLD AUTO: 3.8 10E12/L (ref 3.8–5.2)
WBC # BLD AUTO: 10.1 10E9/L (ref 4–11)

## 2018-10-19 PROCEDURE — 76816 OB US FOLLOW-UP PER FETUS: CPT | Performed by: OBSTETRICS & GYNECOLOGY

## 2018-10-19 PROCEDURE — 82950 GLUCOSE TEST: CPT | Performed by: OBSTETRICS & GYNECOLOGY

## 2018-10-19 PROCEDURE — 90715 TDAP VACCINE 7 YRS/> IM: CPT | Performed by: OBSTETRICS & GYNECOLOGY

## 2018-10-19 PROCEDURE — 90472 IMMUNIZATION ADMIN EACH ADD: CPT | Performed by: OBSTETRICS & GYNECOLOGY

## 2018-10-19 PROCEDURE — 85027 COMPLETE CBC AUTOMATED: CPT | Performed by: OBSTETRICS & GYNECOLOGY

## 2018-10-19 PROCEDURE — 36415 COLL VENOUS BLD VENIPUNCTURE: CPT | Performed by: OBSTETRICS & GYNECOLOGY

## 2018-10-19 PROCEDURE — 99207 ZZC PRENATAL VISIT: CPT | Performed by: OBSTETRICS & GYNECOLOGY

## 2018-10-19 PROCEDURE — 90471 IMMUNIZATION ADMIN: CPT | Performed by: OBSTETRICS & GYNECOLOGY

## 2018-10-19 PROCEDURE — 90686 IIV4 VACC NO PRSV 0.5 ML IM: CPT | Performed by: OBSTETRICS & GYNECOLOGY

## 2018-10-19 NOTE — PROGRESS NOTES
Syphilis is a sexually transmitted disease that can cause birth defects in the babies of untreated mothers. Every pregnant patient is tested for syphilis early in each pregnancy as part of the routine lab work. The [place] Department of [facility] has seen an increase in the rate of syphilis in [place]. The [facility] now recommends testing for syphilis 3 times during a pregnancy, the new prenatal visit, 28 weeks and when admitted for delivery. Patient declines lab testing for syphilis.      Injectable Influenza Immunization Documentation    1.  Is the person to be vaccinated sick today?   No    2. Does the person to be vaccinated have an allergy to a component   of the vaccine?   No  Egg Allergy Algorithm Link    3. Has the person to be vaccinated ever had a serious reaction   to influenza vaccine in the past?   No    4. Has the person to be vaccinated ever had Guillain-Barré syndrome?   No    Form completed by Idalmis Leigh CMA         Prior to injection verified patient identity using patient's name and date of birth.  Due to injection administration, patient instructed to remain in clinic for 15 minutes  afterwards, and to report any adverse reaction to me immediately.

## 2018-10-19 NOTE — MR AVS SNAPSHOT
After Visit Summary   10/19/2018    Amina Gramajo    MRN: [id number]           Patient Information     Date Of Birth          1990        Visit Information        Provider Department      10/19/2018 2:20 PM Olivia Brown DO [facility]        Today's Diagnoses     High-risk pregnancy, third trimester    -  1    Gastroesophageal reflux disease without esophagitis        Need for prophylactic vaccination and inoculation against influenza        Need for Tdap vaccination           Follow-ups after your visit        Follow-up notes from your care team     Return in about 2 weeks (around 11/2/2018).      Your next 10 appointments already scheduled     Oct 19, 2018  2:20 PM CDT   ESTABLISHED PRENATAL with Olivia Brown DO   [facility] ([facility])    [address] [place] [address] [phone]              Who to contact     If you have questions or need follow up information about today's clinic visit or your schedule please contact [facility] directly at [phone].  Normal or non-critical lab and imaging results will be communicated to you by MyChart, letter or phone within 4 business days after the clinic has received the results. If you do not hear from us within 7 days, please contact the clinic through Afraxishart or phone. If you have a critical or abnormal lab result, we will notify you by phone as soon as possible.  Submit refill requests through Innovative Biosensors or call your pharmacy and they will forward the refill request to us. Please allow 3 business days for your refill to be completed.          Additional Information About Your Visit        MyChart Information     Innovative Biosensors gives you secure access to your electronic health record. If you see a primary care provider, you can also send messages to your care team and make appointments. If you have questions, please call  your primary care clinic.  If you do not have a primary care provider, please call [phone] and they will assist you.        Care EveryWhere ID     This is your Care EveryWhere ID. This could be used by other organizations to access your [place] medical records  [id number]        Your Vitals Were     Last Period BMI (Body Mass Index)                04/04/2018 27.82 kg/m2           Blood Pressure from Last 3 Encounters:   10/19/18 110/62   09/14/18 108/68   08/13/18 114/70    Weight from Last 3 Encounters:   10/19/18 175 lb (79.4 kg)   09/14/18 169 lb (76.7 kg)   08/13/18 165 lb (74.8 kg)              We Performed the Following     FLU VACCINE, SPLIT VIRUS, IM (QUADRIVALENT) [16215]- >3 YRS     TDAP VACCINE (ADACEL)     Vaccine Administration, Each Additional [24907]     VACCINE ADMINISTRATION, INITIAL          Today's Medication Changes          These changes are accurate as of 10/19/18  2:11 PM.  If you have any questions, ask your nurse or doctor.               Start taking these medicines.        Dose/Directions    omeprazole 20 MG CR capsule   Commonly known as:  priLOSEC   Used for:  Gastroesophageal reflux disease without esophagitis   Started by:  Olivia Brown DO        Dose:  20 mg   Take 1 capsule (20 mg) by mouth daily   Quantity:  90 capsule   Refills:  3            Where to get your medicines      These medications were sent to [facility] [address] [place] [address]     Phone:  [phone]     omeprazole 20 MG CR capsule                Primary Care Provider Office Phone # Fax #    Ramila Reynolds [phone] [address]  [address] [place] [address]        Equal Access to Services     PINO PITTS AH: Mau blando Soyves, waaxda luqadaha, qaybta kaalmada elena, fany chapin. So [facility] [phone].    ATENCIÓN: Humphrey al,  tiene a lin disposición servicios gratuitos de asistencia lingüística. Floyd haro [phone].    We comply with applicable federal civil rights laws and [place] laws. We do not discriminate on the basis of race, color, national origin, age, disability, sex, sexual orientation, or gender identity.            Thank you!     Thank you for choosing [facility]  for your care. Our goal is always to provide you with excellent care. Hearing back from our patients is one way we can continue to improve our services. Please take a few minutes to complete the written survey that you may receive in the mail after your visit with us. Thank you!             Your Updated Medication List - Protect others around you: Learn how to safely use, store and throw away your medicines at www.disposemymeds.org.          This list is accurate as of 10/19/18  2:11 PM.  Always use your most recent med list.                   Brand Name Dispense Instructions for use Diagnosis    omeprazole 20 MG CR capsule    priLOSEC    90 capsule    Take 1 capsule (20 mg) by mouth daily    Gastroesophageal reflux disease without esophagitis       PRENATAL PO

## 2018-10-19 NOTE — PROGRESS NOTES
No loss of fluid/vaginal bleeding/regular contractions. + FM  GERD is uncontrolled with zantac and tums. Has tried prilosec in past with relief  -Reviewed f/u growth US. Appropriate growth, HC 95%. Pt states this is consistent with her previous children and .   Recommend trending the fetal growth due to planned TOLAC considering hx of 6lb  and 10+lb breech 1LTCS. Pt is very reasonable about expectations for delivery.   -GERD. Start prilosec  -28wk labs today.   -Tdap and Flu vacc today  - Labor precautions. F/U 2wk    Olivia Cameron Masters, DO

## 2018-10-19 NOTE — NURSING NOTE
Screening Questionnaire for Adult Immunization    Are you sick today?   No   Do you have allergies to medications, food, a vaccine component or latex?   No   Have you ever had a serious reaction after receiving a vaccination?   No   Do you have a long-term health problem with heart disease, lung disease, asthma, kidney disease, metabolic disease (e.g. diabetes), anemia, or other blood disorder?   No   Do you have cancer, leukemia, HIV/AIDS, or any other immune system problem?   No   In the past 3 months, have you taken medications that affect  your immune system, such as prednisone, other steroids, or anticancer drugs; drugs for the treatment of rheumatoid arthritis, Crohn s disease, or psoriasis; or have you had radiation treatments?   No   Have you had a seizure, or a brain or other nervous system problem?   No   During the past year, have you received a transfusion of blood or blood     products, or been given immune (gamma) globulin or antiviral drug?   No   For women: Are you pregnant or is there a chance you could become        pregnant during the next month?   Yes   Have you received any vaccinations in the past 4 weeks?   No     Immunization questionnaire was positive for at least one answer.  Dr Brown aware..        Per orders of Dr. Brown, injection of Tdap and Influenza given by Idalmis Leigh. Patient instructed to remain in clinic for 15 minutes afterwards, and to report any adverse reaction to me immediately.       Screening performed by Idalmis Leigh on 10/19/2018 at 1:54 PM.

## 2018-11-01 ENCOUNTER — PRENATAL OFFICE VISIT (OUTPATIENT)
Dept: OBGYN | Facility: CLINIC | Age: 28
End: 2018-11-01
Payer: COMMERCIAL

## 2018-11-01 VITALS — DIASTOLIC BLOOD PRESSURE: 60 MMHG | WEIGHT: 178.4 LBS | SYSTOLIC BLOOD PRESSURE: 102 MMHG | BODY MASS INDEX: 28.36 KG/M2

## 2018-11-01 DIAGNOSIS — O09.93 HIGH-RISK PREGNANCY, THIRD TRIMESTER: Primary | ICD-10-CM

## 2018-11-01 DIAGNOSIS — O34.219 PREVIOUS CESAREAN DELIVERY, ANTEPARTUM: ICD-10-CM

## 2018-11-01 PROCEDURE — 99207 ZZC PRENATAL VISIT: CPT | Performed by: OBSTETRICS & GYNECOLOGY

## 2018-11-01 NOTE — PROGRESS NOTES
Prenatal visit. Would like to schedule a  section at 39 weeks to have a fall back plan in case she does not go into labor prior to that. Otherwise she is doing well. Has good fetal movement.   RTC in 2 weeks.  Bita Marcelino MD

## 2018-11-01 NOTE — MR AVS SNAPSHOT
After Visit Summary   2018    Amina Gramajo    MRN: [id number]           Patient Information     Date Of Birth          1990        Visit Information        Provider Department      2018 4:10 PM Bita Marcelino MD [facility] [place]        Today's Diagnoses     High-risk pregnancy, third trimester    -  1    Previous  delivery, antepartum           Follow-ups after your visit        Follow-up notes from your care team     Return in about 2 weeks (around 11/15/2018).      Your next 10 appointments already scheduled     Nov 15, 2018  3:10 PM CST   ESTABLISHED PRENATAL with Bita Marcelino MD   [facility] [place] ([facility] [place])    [address]  [place] [address]   [phone]            2018  4:00 PM CST   ESTABLISHED PRENATAL with Bita Marcelino MD   [facility] [place] ([facility] [place])    [address]  [place] [address]   [phone]            Dec 13, 2018  2:00 PM CST   US OB >14 WEEKS FOLLOW UP with WEUS1   [facility] [place] ([facility] [place])    [address]  [place] [address]   [phone]           How do I prepare for my exam? (Food and drink instructions) Drink four 8-ounce glasses of fluid an hour before your exam. If you need to empty your bladder before your exam, try to release only a little urine. Then, drink another glass of fluid.  How do I prepare for my exam? (Other instructions) You may have up to two family members in the exam room. If you bring a small child, an adult must be there to care for him or her. No video or camera photography during the procedure.  What should I wear: Wear comfortable clothes.  How long does the exam take: Most ultrasounds take 30 to 60 minutes.  What should I bring: Bring a list of your medicines,  including vitamins, minerals and over-the-counter drugs. It is safest to leave personal items at home.  Do I need a :  No  is needed.  What do I need to tell my doctor: Tell your doctor about any allergies you may have.  What should I do after the exam: No restrictions, You may resume normal activities.  What is this test: An ultrasound uses sound waves to make pictures of the body. Sound waves do not cause pain. The only discomfort may be the pressure of the wand against your skin or full bladder.  Who should I call with questions: If you have any questions, please call the Imaging Department where you will have your exam. Directions, parking instructions, and other information is available on our website, [email or web address].            Dec 13, 2018  2:40 PM CST   ESTABLISHED PRENATAL with Bita Marcelino MD   [facility] [place] ([facility] [place])    [address] [place] [address]   [phone]            Dec 21, 2018  3:30 PM CST   ESTABLISHED PRENATAL with Bita Marcelino MD   [facility] [place] ([facility] [place])    [address] [place] [address] [address]  [facility] [address]   [phone]            Dec 27, 2018  9:30 AM CST   ESTABLISHED PRENATAL with Bita Marcelino MD   [facility] [place] ([facility] [place])    [address] [place] [address] [address]  [facility] [address]   [phone]              Who to contact     If you have questions or need follow up information about today's clinic visit or your schedule please contact [facility] directly at [phone].  Normal or non-critical lab and imaging results will be communicated to you by MyChart, letter or phone within 4 business days after the clinic has received the results. If you do not hear from us within 7 days, please contact the clinic through MyChart or phone. If you have a  critical or abnormal lab result, we will notify you by phone as soon as possible.  Submit refill requests through ADAPTIX or call your pharmacy and they will forward the refill request to us. Please allow 3 business days for your refill to be completed.          Additional Information About Your Visit        Muzuihart Information     ADAPTIX gives you secure access to your electronic health record. If you see a primary care provider, you can also send messages to your care team and make appointments. If you have questions, please call your primary care clinic.  If you do not have a primary care provider, please call [phone] and they will assist you.        Care EveryWhere ID     This is your Care EveryWhere ID. This could be used by other organizations to access your [place] medical records  [id number]        Your Vitals Were     Last Period BMI (Body Mass Index)                04/04/2018 28.36 kg/m2           Blood Pressure from Last 3 Encounters:   11/01/18 102/60   10/19/18 110/62   09/14/18 108/68    Weight from Last 3 Encounters:   11/01/18 178 lb 6.4 oz (80.9 kg)   10/19/18 175 lb (79.4 kg)   09/14/18 169 lb (76.7 kg)              We Performed the Following     Karli-Operative Worksheet        Primary Care Provider Office Phone # Fax #    Ramila Reynolds [phone] [address] [address]        Equal Access to Services     [facility]: Hadii aad ku hadasho Soomaali, waaxda luqadaha, qaybta kaalmada adeegyada, waxay paola haysidra gaspar . So [facility] [phone].    ATENCIÓN: Si habla español, tiene a lin disposición servicios gratuitos de asistencia lingüística. Llame al [phone].    We comply with applicable federal civil rights laws and [place] laws. We do not discriminate on the basis of race, color, national origin, age, disability, sex, sexual orientation, or gender identity.            Thank you!     Thank you for choosing [facility] [place]  for your care. Our goal is always to provide you with excellent care. Hearing back from our patients is one way we can continue to improve our services. Please take a few minutes to complete the written survey that you may receive in the mail after your visit with us. Thank you!             Your Updated Medication List - Protect others around you: Learn how to safely use, store and throw away your medicines at www.disposemymeds.org.          This list is accurate as of 11/1/18  4:38 PM.  Always use your most recent med list.                   Brand Name Dispense Instructions for use Diagnosis    omeprazole 20 MG CR capsule    priLOSEC    90 capsule    Take 1 capsule (20 mg) by mouth daily    Gastroesophageal reflux disease without esophagitis       PRENATAL PO

## 2018-11-02 ENCOUNTER — TELEPHONE (OUTPATIENT)
Dept: OBGYN | Facility: CLINIC | Age: 28
End: 2018-11-02

## 2018-11-02 NOTE — TELEPHONE ENCOUNTER
Type of surgery: RPT CS  Location of surgery: [facility] OR  Date and time of surgery: 2019 1pm ARRIVAL 11am  Surgeon: Ryland piper/Delicia to Assist  Pre-Op Appt Date: TBD  Post-Op Appt Date: TBD   Packet sent out: MAILED 2018  Pre-cert/Authorization completed:  TBD  Date: 2018 Vincent piper/Collette Bryant  Surgery Scheduler      Order Questions      Question Answer Comment     Procedure name(s) - multi select Repeat  Section      Is this a multi surgeon case? Yes      Laterality N/A      Reason for procedure Previous  Section      Location of Case: [facility] OR      Surgeon Procedure Time (incision to closure) in minutes (per procedure as applicable) 60      Note:  Surgical Case Time Needed (in minutes)     Date of Surgery (Requested): 2019      Patient Class (for admit prior to surgery, specify number of days in comments): Surgery admit      Anesthesia Spinal      H&P To Be Completed By: Surgeon      Where is the note? In EpicProgress Note       needed? No      Post-Op Appointment 6 weeks      Procedure will be performed or supervised by: Bita Marcelino MD      Surgical Assistant Jonn Nesbitt MD      Vendor Needed? No

## 2018-11-15 ENCOUNTER — PRENATAL OFFICE VISIT (OUTPATIENT)
Dept: OBGYN | Facility: CLINIC | Age: 28
End: 2018-11-15
Payer: COMMERCIAL

## 2018-11-15 VITALS — SYSTOLIC BLOOD PRESSURE: 110 MMHG | BODY MASS INDEX: 28.59 KG/M2 | WEIGHT: 179.8 LBS | DIASTOLIC BLOOD PRESSURE: 48 MMHG

## 2018-11-15 DIAGNOSIS — O34.219 PREVIOUS CESAREAN DELIVERY, ANTEPARTUM: Primary | ICD-10-CM

## 2018-11-15 PROCEDURE — 99207 ZZC PRENATAL VISIT: CPT | Performed by: OBSTETRICS & GYNECOLOGY

## 2018-11-15 NOTE — MR AVS SNAPSHOT
After Visit Summary   11/15/2018    Amina ROPER    MRN: [id number]           Patient Information     Date Of Birth          1990        Visit Information        Provider Department      11/15/2018 3:10 PM Bita Marcelino MD [facility] [place]        Today's Diagnoses     Previous  delivery, antepartum    -  1       Follow-ups after your visit        Follow-up notes from your care team     Return in about 2 weeks (around 2018).      Your next 10 appointments already scheduled     2018  4:00 PM CST   ESTABLISHED PRENATAL with Bita Marcelino MD   [facility] [place] [facility] [place]    [address]  [place] [address]   [phone]            Dec 13, 2018  2:00 PM CST   US OB >14 WEEKS FOLLOW UP with WEUS1   [facility] ([facility] [place])    [address]  [place] [address]   [phone]           How do I prepare for my exam? (Food and drink instructions) Drink four 8-ounce glasses of fluid an hour before your exam. If you need to empty your bladder before your exam, try to release only a little urine. Then, drink another glass of fluid.  How do I prepare for my exam? (Other instructions) You may have up to two family members in the exam room. If you bring a small child, an adult must be there to care for him or her. No video or camera photography during the procedure.  What should I wear: Wear comfortable clothes.  How long does the exam take: Most ultrasounds take 30 to 60 minutes.  What should I bring: Bring a list of your medicines, including vitamins, minerals and over-the-counter drugs. It is safest to leave personal items at home.  Do I need a :  No  is needed.  What do I need to tell my doctor: Tell your doctor about any allergies you may have.  What should I do after the exam: No restrictions,  You may resume normal activities.  What is this test: An ultrasound uses sound waves to make pictures of the body. Sound waves do not cause pain. The only discomfort may be the pressure of the wand against your skin or full bladder.  Who should I call with questions: If you have any questions, please call the Imaging Department where you will have your exam. Directions, parking instructions, and other information is available on our website, [email or web address].            Dec 13, 2018  2:40 PM CST   ESTABLISHED PRENATAL with Bita Marcelino MD   [facility] [place] ([facility] [place])    [address]  [place] [address]   [phone]            Dec 21, 2018  3:30 PM CST   ESTABLISHED PRENATAL with Bita Marcelino MD   [facility] [place] ([facility] [place])    [address]  [place] [address]   [phone]            Dec 27, 2018  9:30 AM CST   ESTABLISHED PRENATAL with Bita Marcelino MD   [facility] [place] ([facility] [place])    [address]  [address]  [place] [address]   [phone]            Jan 02, 2019   Procedure with Bita Marcelino MD    [facility] (--)    [address] [place] [address], [address]  [facility] [address]   [phone]              Who to contact     If you have questions or need follow up information about today's clinic visit or your schedule please contact [facility] directly at [phone].  Normal or non-critical lab and imaging results will be communicated to you by MyChart, letter or phone within 4 business days after the clinic has received the results. If you do not hear from us within 7 days, please contact the clinic through MyChart or phone. If you have a critical or abnormal lab result, we will notify you by phone as soon as possible.  Submit refill requests through [employer]lyte Softwarehart or  call your pharmacy and they will forward the refill request to us. Please allow 3 business days for your refill to be completed.          Additional Information About Your Visit        MyChart Information     YYogahart gives you secure access to your electronic health record. If you see a primary care provider, you can also send messages to your care team and make appointments. If you have questions, please call your primary care clinic.  If you do not have a primary care provider, please call [phone] and they will assist you.        Care EveryWhere ID     This is your Care EveryWhere ID. This could be used by other organizations to access your [place] medical records  [id number]        Your Vitals Were     Last Period Breastfeeding? BMI (Body Mass Index)             04/04/2018 No 28.59 kg/m2          Blood Pressure from Last 3 Encounters:   11/15/18 110/48   11/01/18 102/60   10/19/18 110/62    Weight from Last 3 Encounters:   11/15/18 179 lb 12.8 oz (81.6 kg)   11/01/18 178 lb 6.4 oz (80.9 kg)   10/19/18 175 lb (79.4 kg)              Today, you had the following     No orders found for display       Primary Care Provider Office Phone # Fax #    Ramila Reynolds [phone] [address] [id number]        Equal Access to Services     DILEEP PITTS : Hadii aad ku hadasho Soomaali, waaxda luqadaha, qaybta kaalmada adeegyada, waxay paola haysidra gaspar . So [place] [phone].    ATENCIÓN: Si habla español, tiene a lin disposición servicios gratuitos de asistencia lingüística. Llame al [phone].    We comply with applicable federal civil rights laws and [place] laws. We do not discriminate on the basis of race, color, national origin, age, disability, sex, sexual orientation, or gender identity.            Thank you!     Thank you for choosing [facility]  for your care. Our goal is always to provide you with excellent care. Hearing  back from our patients is one way we can continue to improve our services. Please take a few minutes to complete the written survey that you may receive in the mail after your visit with us. Thank you!             Your Updated Medication List - Protect others around you: Learn how to safely use, store and throw away your medicines at www.disposemymeds.org.          This list is accurate as of 11/15/18  3:29 PM.  Always use your most recent med list.                   Brand Name Dispense Instructions for use Diagnosis    omeprazole 20 MG CR capsule    priLOSEC    90 capsule    Take 1 capsule (20 mg) by mouth daily    Gastroesophageal reflux disease without esophagitis       PRENATAL PO

## 2018-11-15 NOTE — PROGRESS NOTES
Prenatal visit: doing well. Good fetal movement. No concerns. No contractions  Bita Marcelino MD

## 2018-11-20 ENCOUNTER — TELEPHONE (OUTPATIENT)
Dept: OBGYN | Facility: CLINIC | Age: 28
End: 2018-11-20

## 2018-11-20 NOTE — TELEPHONE ENCOUNTER
Returned pt call   @ 32w6d  Pt started vomiting yesterday around 1800 and through the night. Nothing since she woke.  Experiencing aches and chills. Unsure of fever, but feeling hot/cold all night  No diarrhea  Hx of acid reflux and she has to sit up to prevent acid from coming now.    Denies uterine cramping, LOF, or VB  Reporting active FM    Encouraged pt start sips of fluids and BRAT diet, adding slowly as tolerated, focus on rehydrating today. Most likely viral. If sx's worsen or persist, call back especially if unable to keep anything down after 24 hours or she experiences uterine cramping, LOF, VB, or DFM.  The patient indicates understanding of these issues and agrees with the plan.

## 2018-11-29 ENCOUNTER — PRENATAL OFFICE VISIT (OUTPATIENT)
Dept: OBGYN | Facility: CLINIC | Age: 28
End: 2018-11-29
Payer: COMMERCIAL

## 2018-11-29 VITALS — DIASTOLIC BLOOD PRESSURE: 48 MMHG | WEIGHT: 180.6 LBS | SYSTOLIC BLOOD PRESSURE: 100 MMHG | BODY MASS INDEX: 28.71 KG/M2

## 2018-11-29 DIAGNOSIS — O34.219 PREVIOUS CESAREAN DELIVERY, ANTEPARTUM: Primary | ICD-10-CM

## 2018-11-29 PROCEDURE — 99207 ZZC PRENATAL VISIT: CPT | Performed by: OBSTETRICS & GYNECOLOGY

## 2018-11-29 NOTE — MR AVS SNAPSHOT
After Visit Summary   2018    Amnia ROPER    MRN: [id number]           Patient Information     Date Of Birth          1990        Visit Information        Provider Department      2018 4:00 PM Bita Marcelino MD [facility] [place]        Today's Diagnoses     Previous  delivery, antepartum    -  1       Follow-ups after your visit        Follow-up notes from your care team     Return in about 2 weeks (around 2018).      Your next 10 appointments already scheduled     Dec 13, 2018  2:00 PM CST   US OB >14 WEEKS FOLLOW UP with WEUS1   [facility] [place] ([facility] [place])    [address]  [facility] [address] [phone]           How do I prepare for my exam? (Food and drink instructions) Drink four 8-ounce glasses of fluid. Finish drinking an hour before your exam, so you have a full bladder. If you need to empty your bladder before your exam, try to release only a little urine. Then, drink another glass of fluid.  How do I prepare for my exam? (Other instructions) You may have up to two family members in the exam room. If you bring a small child, an adult must be there to care for him or her. No video or camera photography during the procedure.  What should I wear: Wear comfortable clothes.  How long does the exam take: Most ultrasounds take 30 to 60 minutes.  What should I bring: Bring a list of your medicines, including vitamins, minerals and over-the-counter drugs. It is safest to leave personal items at home.  Do I need a :  No  is needed.  What do I need to tell my doctor: Tell your doctor about any allergies you may have.  What should I do after the exam: No restrictions, you may resume normal activities.  What is this test: An ultrasound uses sound waves to make pictures of the body. Sound waves do not cause pain. The only discomfort may be the pressure of the  wand against your skin or full bladder.  Who should I call with questions: If you have any questions, please call the Imaging Department where you will have your exam. Directions, parking instructions, and other information are available on our website, [email or web address].            Dec 13, 2018  2:40 PM CST   ESTABLISHED PRENATAL with Bita Marcelino MD   [facility] [place] ([facility] [place])    [address]  [place] [address]   [phone]            Dec 21, 2018  3:30 PM CST   ESTABLISHED PRENATAL with Bita Marcelino MD   [facility] [place] ([facility] [place])    [address]  [place] [address]   [phone]            Dec 27, 2018  9:30 AM CST   ESTABLISHED PRENATAL with Bita Marcelino MD   [facility] [place] ([facility] [place])    [address]  [place] [address]   [phone]            Jan 02, 2019   Procedure with Bita Marcelino MD    [facility]irthplace (--)    [address]  [place] [address]   [phone]              Who to contact     If you have questions or need follow up information about today's clinic visit or your schedule please contact [facility] directly at [phone].  Normal or non-critical lab and imaging results will be communicated to you by [employer].comhart, letter or phone within 4 business days after the clinic has received the results. If you do not hear from us within 7 days, please contact the clinic through [employer].comhart or phone. If you have a critical or abnormal lab result, we will notify you by phone as soon as possible.  Submit refill requests through Nomesia or call your pharmacy and they will forward the refill request to us. Please allow 3 business days for your refill to be completed.          Additional Information About Your Visit        Nomesia  Information     [email or web address] gives you secure access to your electronic health record. If you see a primary care provider, you can also send messages to your care team and make appointments. If you have questions, please call your primary care clinic.  If you do not have a primary care provider, please call [phone] and they will assist you.        Care EveryWhere ID     This is your Care EveryWhere ID. This could be used by other organizations to access your [place] medical records  [id number]        Your Vitals Were     Last Period Breastfeeding? BMI (Body Mass Index)             04/04/2018 No 28.71 kg/m2          Blood Pressure from Last 3 Encounters:   11/29/18 100/48   11/15/18 110/48   11/01/18 102/60    Weight from Last 3 Encounters:   11/29/18 180 lb 9.6 oz (81.9 kg)   11/15/18 179 lb 12.8 oz (81.6 kg)   11/01/18 178 lb 6.4 oz (80.9 kg)              Today, you had the following     No orders found for display       Primary Care Provider Office Phone # Fax #    Ramila MICHAEL Reynolds [phone]       [address]  [address] [place] [address]        Equal Access to Services     PINO PITTS [facility]: Hadii olamide chavez hadasho Soomaali, waaxda luqadaha, qaybta kaalmada adeegyada, fany chapin. So [facility] [phone].    ATENCIÓN: Si habla español, tiene a lin disposición servicios gratuitos de asistencia lingüística. Abdulkadirame al [phone].    We comply with applicable federal civil rights laws and [place] laws. We do not discriminate on the basis of race, color, national origin, age, disability, sex, sexual orientation, or gender identity.            Thank you!     Thank you for choosing [facility]  for your care. Our goal is always to provide you with excellent care. Hearing back from our patients is one way we can continue to improve our services. Please take a few minutes to complete the written survey that you may receive in the mail after your visit with  us. Thank you!             Your Updated Medication List - Protect others around you: Learn how to safely use, store and throw away your medicines at www.disposemymeds.org.          This list is accurate as of 11/29/18  4:47 PM.  Always use your most recent med list.                   Brand Name Dispense Instructions for use Diagnosis    omeprazole 20 MG DR capsule    priLOSEC    90 capsule    Take 1 capsule (20 mg) by mouth daily    Gastroesophageal reflux disease without esophagitis       PRENATAL PO

## 2018-11-29 NOTE — PROGRESS NOTES
Prenatal Visit: here with Kulwant and Jordy. Doing well. Good fetal movement. No concerns. Would like to deliver vaginally if she can. Plan for growth sonogram and GBS at her next appointment.   RTC in 2 weeks  Bita Marcelino MD

## 2018-12-13 ENCOUNTER — ANCILLARY PROCEDURE (OUTPATIENT)
Dept: ULTRASOUND IMAGING | Facility: CLINIC | Age: 28
End: 2018-12-13
Payer: COMMERCIAL

## 2018-12-13 ENCOUNTER — PRENATAL OFFICE VISIT (OUTPATIENT)
Dept: OBGYN | Facility: CLINIC | Age: 28
End: 2018-12-13
Payer: COMMERCIAL

## 2018-12-13 VITALS — BODY MASS INDEX: 29.03 KG/M2 | SYSTOLIC BLOOD PRESSURE: 110 MMHG | WEIGHT: 182.6 LBS | DIASTOLIC BLOOD PRESSURE: 52 MMHG

## 2018-12-13 DIAGNOSIS — Z36.85 ANTENATAL SCREENING FOR STREPTOCOCCUS B: ICD-10-CM

## 2018-12-13 DIAGNOSIS — O34.219 PREVIOUS CESAREAN DELIVERY, ANTEPARTUM: ICD-10-CM

## 2018-12-13 DIAGNOSIS — O09.93 HIGH-RISK PREGNANCY, THIRD TRIMESTER: Primary | ICD-10-CM

## 2018-12-13 PROCEDURE — 99207 ZZC PRENATAL VISIT: CPT | Performed by: OBSTETRICS & GYNECOLOGY

## 2018-12-13 PROCEDURE — 87653 STREP B DNA AMP PROBE: CPT | Performed by: OBSTETRICS & GYNECOLOGY

## 2018-12-13 PROCEDURE — 76816 OB US FOLLOW-UP PER FETUS: CPT | Performed by: OBSTETRICS & GYNECOLOGY

## 2018-12-13 NOTE — PROGRESS NOTES
Prenatal visit. Doing well. Growth sonogram wnl today. Good fetal movement. Reassurance given. GBS collected.  RTC in 1 week  Bita Marcelino MD

## 2018-12-14 LAB
GP B STREP DNA SPEC QL NAA+PROBE: NEGATIVE
SPECIMEN SOURCE: NORMAL

## 2018-12-21 ENCOUNTER — HOSPITAL ENCOUNTER (OUTPATIENT)
Facility: CLINIC | Age: 28
Discharge: HOME | End: 2018-12-21
Attending: OBSTETRICS & GYNECOLOGY | Admitting: OBSTETRICS & GYNECOLOGY
Payer: COMMERCIAL

## 2018-12-21 ENCOUNTER — APPOINTMENT (OUTPATIENT)
Dept: ULTRASOUND IMAGING | Facility: CLINIC | Age: 28
End: 2018-12-21
Attending: OBSTETRICS & GYNECOLOGY
Payer: COMMERCIAL

## 2018-12-21 ENCOUNTER — PRENATAL OFFICE VISIT (OUTPATIENT)
Dept: OBGYN | Facility: CLINIC | Age: 28
End: 2018-12-21
Payer: COMMERCIAL

## 2018-12-21 VITALS — WEIGHT: 184.8 LBS | BODY MASS INDEX: 29.38 KG/M2 | SYSTOLIC BLOOD PRESSURE: 104 MMHG | DIASTOLIC BLOOD PRESSURE: 54 MMHG

## 2018-12-21 VITALS — TEMPERATURE: 98.1 F | SYSTOLIC BLOOD PRESSURE: 121 MMHG | DIASTOLIC BLOOD PRESSURE: 61 MMHG

## 2018-12-21 DIAGNOSIS — O34.219 PREVIOUS CESAREAN DELIVERY, ANTEPARTUM: Primary | ICD-10-CM

## 2018-12-21 PROCEDURE — 76819 FETAL BIOPHYS PROFIL W/O NST: CPT

## 2018-12-21 PROCEDURE — 99207 ZZC PRENATAL VISIT: CPT | Performed by: OBSTETRICS & GYNECOLOGY

## 2018-12-21 PROCEDURE — G0463 HOSPITAL OUTPT CLINIC VISIT: HCPCS | Mod: 25

## 2018-12-21 RX ORDER — ONDANSETRON 2 MG/ML
4 INJECTION INTRAMUSCULAR; INTRAVENOUS EVERY 6 HOURS PRN
Status: CANCELLED | OUTPATIENT
Start: 2018-12-21

## 2018-12-21 RX ORDER — SODIUM CHLORIDE, SODIUM LACTATE, POTASSIUM CHLORIDE, CALCIUM CHLORIDE 600; 310; 30; 20 MG/100ML; MG/100ML; MG/100ML; MG/100ML
INJECTION, SOLUTION INTRAVENOUS CONTINUOUS
Status: CANCELLED | OUTPATIENT
Start: 2018-12-21

## 2018-12-21 RX ORDER — ONDANSETRON 2 MG/ML
4 INJECTION INTRAMUSCULAR; INTRAVENOUS EVERY 6 HOURS PRN
Status: DISCONTINUED | OUTPATIENT
Start: 2018-12-21 | End: 2018-12-21 | Stop reason: HOSPADM

## 2018-12-21 SDOH — HEALTH STABILITY: MENTAL HEALTH: HOW OFTEN DO YOU HAVE A DRINK CONTAINING ALCOHOL?: NEVER

## 2018-12-21 NOTE — DISCHARGE INSTRUCTIONS
Data: Patient presented to [facility] at 1630.   Reason for maternal/fetal assessment per patient is Decreased Fetal Movement  .  Patient is a . Prenatal record reviewed.   . Gestational Age 37w2d. VSS. Fetal movement present. Patient denies cramping, backache, vaginal discharge, pelvic pressure, UTI symptoms, GI problems, bloody show, vaginal bleeding, edema, headache, visual disturbances, epigastric or URQ pain, abdominal pain, rupture of membranes. Support persons not present.  Action: Verbal consent for EFM. Triage assessment completed. EFM applied for . Uterine assessment denies CTX. Fetal assessment: Presumed adequate fetal oxygenation documented (see flow record).   Response: Dr. Reddy informed of [facility] . Plan per provider is discharge to home-return to [facility] on  for NST. Patient verbalized agreement with plan.     Face to Face time: 0-15 minutes

## 2018-12-21 NOTE — PLAN OF CARE
18- 1630- [id number] 19- hx C/S/breech-desires TOLAC- sent from office for non reactive NST- NON per Dr Reddy for BPP- return in 48hr for repeat NST -if BPP<8/8 will stay for 24hr observation.  1710- Dr Riddle updated of BPP - patient discharged to home with instructions to return to [facility] in 48 hr for NST-- instructions verbally acknowledged with understanding-encourage kick counts-call for DFM.

## 2018-12-21 NOTE — PROGRESS NOTES
NST INTERPRETATION    Baseline Rate: 120  Accelerations: not present  Decelerations: not present  Reactive:  No    Madelyn Hall RN on 12/21/2018 at 4:16 PM

## 2018-12-23 ENCOUNTER — HOSPITAL ENCOUNTER (OUTPATIENT)
Facility: CLINIC | Age: 28
Discharge: HOME | End: 2018-12-23
Attending: OBSTETRICS & GYNECOLOGY | Admitting: OBSTETRICS & GYNECOLOGY
Payer: COMMERCIAL

## 2018-12-23 VITALS
BODY MASS INDEX: 28.56 KG/M2 | SYSTOLIC BLOOD PRESSURE: 107 MMHG | RESPIRATION RATE: 16 BRPM | WEIGHT: 182 LBS | DIASTOLIC BLOOD PRESSURE: 52 MMHG | TEMPERATURE: 98.5 F | HEART RATE: 83 BPM | HEIGHT: 67 IN

## 2018-12-23 PROBLEM — Z36.89 NST (NON-STRESS TEST) REACTIVE: Status: ACTIVE | Noted: 2018-12-23

## 2018-12-23 PROCEDURE — G0463 HOSPITAL OUTPT CLINIC VISIT: HCPCS | Mod: 25

## 2018-12-23 PROCEDURE — 59025 FETAL NON-STRESS TEST: CPT

## 2018-12-23 RX ORDER — ONDANSETRON 2 MG/ML
4 INJECTION INTRAMUSCULAR; INTRAVENOUS EVERY 6 HOURS PRN
Status: DISCONTINUED | OUTPATIENT
Start: 2018-12-23 | End: 2018-12-23 | Stop reason: HOSPADM

## 2018-12-23 ASSESSMENT — MIFFLIN-ST. JEOR: SCORE: 1585.24

## 2018-12-23 NOTE — DISCHARGE INSTRUCTIONS
Discharge Instruction for Undelivered Patients      You were seen for: Fetal Assessment  We Consulted: Dr. Marcelino  You had (Test or Medicine):NST     Diet:   Drink 8 to 12 glasses of liquids (milk, juice, water) every day.  You may eat meals and snacks.     Activity:  Count fetal kicks everyday (see handout)  Call your doctor or nurse midwife if your baby is moving less than usual.     Call your provider if you notice:  Swelling in your face or increased swelling in your hands or legs.  Headaches that are not relieved by Tylenol (acetaminophen).  Changes in your vision (blurring: seeing spots or stars.)  Nausea (sick to your stomach) and vomiting (throwing up).   Weight gain of 5 pounds or more per week.  Heartburn that doesn't go away.  Signs of bladder infection: pain when you urinate (use the toilet), need to go more often and more urgently.  The bag of damon (rupture of membranes) breaks, or you notice leaking in your underwear.  Bright red blood in your underwear.  Abdominal (lower belly) or stomach pain.  For first baby: Contractions (tightening) less than 5 minutes apart for one hour or more.  Second (plus) baby: Contractions (tightening) less than 10 minutes apart and getting stronger.  *If less than 34 weeks: Contractions (tightenings) more than 6 times in one hour.  Increase or change in vaginal discharge (note the color and amount)      Follow-up:  As scheduled in the clinic

## 2018-12-23 NOTE — PROGRESS NOTES
NST reviewed remotely. Baseline 130bpm. + accels/ no decels/ mod variability. Reactive non-stress test. Can return to routine OB follow up.

## 2018-12-24 NOTE — PROGRESS NOTES
Patient here for a repeat NST as requested by her MD.     Strip was reactive after giving patient juice. Dr. Marcelino is called and informed of reactive strip and gives order to send patient home.     Discharge instructions given to patient and patient leaves ambulatory at 1745

## 2018-12-27 ENCOUNTER — PRENATAL OFFICE VISIT (OUTPATIENT)
Dept: OBGYN | Facility: CLINIC | Age: 28
End: 2018-12-27
Payer: COMMERCIAL

## 2018-12-27 VITALS — BODY MASS INDEX: 29.29 KG/M2 | WEIGHT: 184.2 LBS | SYSTOLIC BLOOD PRESSURE: 106 MMHG | DIASTOLIC BLOOD PRESSURE: 58 MMHG

## 2018-12-27 DIAGNOSIS — O34.219 PREVIOUS CESAREAN DELIVERY, ANTEPARTUM: Primary | ICD-10-CM

## 2018-12-27 PROCEDURE — 99207 ZZC PRENATAL VISIT: CPT | Performed by: OBSTETRICS & GYNECOLOGY

## 2018-12-27 RX ORDER — TERBUTALINE SULFATE 1 MG/ML
0.25 INJECTION, SOLUTION SUBCUTANEOUS
Status: CANCELLED | OUTPATIENT
Start: 2018-12-27

## 2018-12-27 RX ORDER — LIDOCAINE 40 MG/G
CREAM TOPICAL
Status: CANCELLED | OUTPATIENT
Start: 2018-12-27

## 2018-12-27 RX ORDER — OXYTOCIN 10 [USP'U]/ML
10 INJECTION, SOLUTION INTRAMUSCULAR; INTRAVENOUS
Status: CANCELLED | OUTPATIENT
Start: 2018-12-27

## 2018-12-27 RX ORDER — CARBOPROST TROMETHAMINE 250 UG/ML
250 INJECTION, SOLUTION INTRAMUSCULAR
Status: CANCELLED | OUTPATIENT
Start: 2018-12-27

## 2018-12-27 RX ORDER — SODIUM CHLORIDE, SODIUM LACTATE, POTASSIUM CHLORIDE, CALCIUM CHLORIDE 600; 310; 30; 20 MG/100ML; MG/100ML; MG/100ML; MG/100ML
INJECTION, SOLUTION INTRAVENOUS CONTINUOUS
Status: CANCELLED | OUTPATIENT
Start: 2018-12-27

## 2018-12-27 RX ORDER — ONDANSETRON 2 MG/ML
4 INJECTION INTRAMUSCULAR; INTRAVENOUS EVERY 6 HOURS PRN
Status: CANCELLED | OUTPATIENT
Start: 2018-12-27

## 2018-12-27 RX ORDER — ACETAMINOPHEN 325 MG/1
650 TABLET ORAL EVERY 4 HOURS PRN
Status: CANCELLED | OUTPATIENT
Start: 2018-12-27

## 2018-12-27 RX ORDER — OXYCODONE AND ACETAMINOPHEN 5; 325 MG/1; MG/1
1 TABLET ORAL
Status: CANCELLED | OUTPATIENT
Start: 2018-12-27

## 2018-12-27 RX ORDER — IBUPROFEN 200 MG
800 TABLET ORAL
Status: CANCELLED | OUTPATIENT
Start: 2018-12-27

## 2018-12-27 RX ORDER — NALOXONE HYDROCHLORIDE 0.4 MG/ML
.1-.4 INJECTION, SOLUTION INTRAMUSCULAR; INTRAVENOUS; SUBCUTANEOUS
Status: CANCELLED | OUTPATIENT
Start: 2018-12-27

## 2018-12-27 RX ORDER — METHYLERGONOVINE MALEATE 0.2 MG/ML
200 INJECTION INTRAVENOUS
Status: CANCELLED | OUTPATIENT
Start: 2018-12-27

## 2018-12-27 RX ORDER — OXYTOCIN/0.9 % SODIUM CHLORIDE 30/500 ML
1-24 PLASTIC BAG, INJECTION (ML) INTRAVENOUS CONTINUOUS
Status: CANCELLED | OUTPATIENT
Start: 2018-12-27

## 2018-12-27 RX ORDER — OXYTOCIN/0.9 % SODIUM CHLORIDE 30/500 ML
100-340 PLASTIC BAG, INJECTION (ML) INTRAVENOUS CONTINUOUS PRN
Status: CANCELLED | OUTPATIENT
Start: 2018-12-27

## 2018-12-27 RX ORDER — FENTANYL CITRATE 50 UG/ML
50-100 INJECTION, SOLUTION INTRAMUSCULAR; INTRAVENOUS
Status: CANCELLED | OUTPATIENT
Start: 2018-12-27

## 2018-12-27 NOTE — PROGRESS NOTES
Prenatal Visit: doing well. Good fetal movement. Not really having contractions. Would like to proceed with TOLAC induction if not delivered by 39 weeks rather than a repeat  section. Discussed risk of uterine rupture being higher than 1% with augmentation but less than 1% without augmentation. Will formally consent at time of amniotomy and pitocin if undelivered by 19. Will also cancel her scheduled  section.  Labor precautions given.  Bita Marcelino MD

## 2018-12-27 NOTE — TELEPHONE ENCOUNTER
Per Dr. Marcelino-cancel section for 1/2/19. Plan for TOLAC. Case canceled with Luz Elena.     CJ Paulino-I only called to cancel, nothing more.

## 2019-01-02 ENCOUNTER — ANESTHESIA (OUTPATIENT)
Dept: OBGYN | Facility: CLINIC | Age: 29
End: 2019-01-02
Payer: COMMERCIAL

## 2019-01-02 ENCOUNTER — HOSPITAL ENCOUNTER (INPATIENT)
Facility: CLINIC | Age: 29
LOS: 2 days | Discharge: HOME | End: 2019-01-04
Attending: OBSTETRICS & GYNECOLOGY | Admitting: OBSTETRICS & GYNECOLOGY
Payer: COMMERCIAL

## 2019-01-02 ENCOUNTER — ANESTHESIA EVENT (OUTPATIENT)
Dept: OBGYN | Facility: CLINIC | Age: 29
End: 2019-01-02
Payer: COMMERCIAL

## 2019-01-02 DIAGNOSIS — O34.219 VBAC, DELIVERED: Primary | ICD-10-CM

## 2019-01-02 PROBLEM — O36.8390 NON-REASSURING ELECTRONIC FETAL MONITORING TRACING: Status: ACTIVE | Noted: 2019-01-02

## 2019-01-02 LAB
ABO + RH BLD: NORMAL
ABO + RH BLD: NORMAL
BASOPHILS # BLD AUTO: 0 10E9/L (ref 0–0.2)
BASOPHILS NFR BLD AUTO: 0.1 %
BLD GP AB SCN SERPL QL: NORMAL
BLOOD BANK CMNT PATIENT-IMP: NORMAL
DIFFERENTIAL METHOD BLD: ABNORMAL
EOSINOPHIL # BLD AUTO: 0.1 10E9/L (ref 0–0.7)
EOSINOPHIL NFR BLD AUTO: 0.9 %
ERYTHROCYTE [DISTWIDTH] IN BLOOD BY AUTOMATED COUNT: 13.7 % (ref 10–15)
HCT VFR BLD AUTO: 32.5 % (ref 35–47)
HGB BLD-MCNC: 10.6 G/DL (ref 11.7–15.7)
IMM GRANULOCYTES # BLD: 0 10E9/L (ref 0–0.4)
IMM GRANULOCYTES NFR BLD: 0.3 %
LYMPHOCYTES # BLD AUTO: 1.6 10E9/L (ref 0.8–5.3)
LYMPHOCYTES NFR BLD AUTO: 18 %
MCH RBC QN AUTO: 26.9 PG (ref 26.5–33)
MCHC RBC AUTO-ENTMCNC: 32.6 G/DL (ref 31.5–36.5)
MCV RBC AUTO: 83 FL (ref 78–100)
MONOCYTES # BLD AUTO: 0.6 10E9/L (ref 0–1.3)
MONOCYTES NFR BLD AUTO: 7 %
NEUTROPHILS # BLD AUTO: 6.4 10E9/L (ref 1.6–8.3)
NEUTROPHILS NFR BLD AUTO: 73.7 %
NRBC # BLD AUTO: 0 10*3/UL
NRBC BLD AUTO-RTO: 0 /100
PLATELET # BLD AUTO: 183 10E9/L (ref 150–450)
RBC # BLD AUTO: 3.94 10E12/L (ref 3.8–5.2)
SPECIMEN EXP DATE BLD: NORMAL
WBC # BLD AUTO: 8.7 10E9/L (ref 4–11)

## 2019-01-02 PROCEDURE — 25000125 ZZHC RX 250: Performed by: ANESTHESIOLOGY

## 2019-01-02 PROCEDURE — 12000035 ZZH R&B POSTPARTUM

## 2019-01-02 PROCEDURE — 0KQM0ZZ REPAIR PERINEUM MUSCLE, OPEN APPROACH: ICD-10-PCS | Performed by: OBSTETRICS & GYNECOLOGY

## 2019-01-02 PROCEDURE — 25000128 H RX IP 250 OP 636: Performed by: ANESTHESIOLOGY

## 2019-01-02 PROCEDURE — 36415 COLL VENOUS BLD VENIPUNCTURE: CPT | Performed by: OBSTETRICS & GYNECOLOGY

## 2019-01-02 PROCEDURE — 85025 COMPLETE CBC W/AUTO DIFF WBC: CPT | Performed by: OBSTETRICS & GYNECOLOGY

## 2019-01-02 PROCEDURE — 86900 BLOOD TYPING SEROLOGIC ABO: CPT | Performed by: OBSTETRICS & GYNECOLOGY

## 2019-01-02 PROCEDURE — 25000132 ZZH RX MED GY IP 250 OP 250 PS 637: Performed by: OBSTETRICS & GYNECOLOGY

## 2019-01-02 PROCEDURE — 86850 RBC ANTIBODY SCREEN: CPT | Performed by: OBSTETRICS & GYNECOLOGY

## 2019-01-02 PROCEDURE — 86780 TREPONEMA PALLIDUM: CPT | Performed by: OBSTETRICS & GYNECOLOGY

## 2019-01-02 PROCEDURE — 25000128 H RX IP 250 OP 636: Performed by: OBSTETRICS & GYNECOLOGY

## 2019-01-02 PROCEDURE — 37000011 ZZH ANESTHESIA WARD SERVICE

## 2019-01-02 PROCEDURE — 3E0R3BZ INTRODUCTION OF ANESTHETIC AGENT INTO SPINAL CANAL, PERCUTANEOUS APPROACH: ICD-10-PCS | Performed by: ANESTHESIOLOGY

## 2019-01-02 PROCEDURE — 27110038 ZZH RX 271: Performed by: ANESTHESIOLOGY

## 2019-01-02 PROCEDURE — 00HU33Z INSERTION OF INFUSION DEVICE INTO SPINAL CANAL, PERCUTANEOUS APPROACH: ICD-10-PCS | Performed by: ANESTHESIOLOGY

## 2019-01-02 PROCEDURE — 86901 BLOOD TYPING SEROLOGIC RH(D): CPT | Performed by: OBSTETRICS & GYNECOLOGY

## 2019-01-02 PROCEDURE — 25000125 ZZHC RX 250: Performed by: OBSTETRICS & GYNECOLOGY

## 2019-01-02 PROCEDURE — 10907ZC DRAINAGE OF AMNIOTIC FLUID, THERAPEUTIC FROM PRODUCTS OF CONCEPTION, VIA NATURAL OR ARTIFICIAL OPENING: ICD-10-PCS | Performed by: OBSTETRICS & GYNECOLOGY

## 2019-01-02 PROCEDURE — 72200001 ZZH LABOR CARE VAGINAL DELIVERY SINGLE

## 2019-01-02 PROCEDURE — 3E033VJ INTRODUCTION OF OTHER HORMONE INTO PERIPHERAL VEIN, PERCUTANEOUS APPROACH: ICD-10-PCS | Performed by: OBSTETRICS & GYNECOLOGY

## 2019-01-02 RX ORDER — OXYTOCIN/0.9 % SODIUM CHLORIDE 30/500 ML
1-24 PLASTIC BAG, INJECTION (ML) INTRAVENOUS CONTINUOUS
Status: DISCONTINUED | OUTPATIENT
Start: 2019-01-02 | End: 2019-01-02

## 2019-01-02 RX ORDER — ACETAMINOPHEN 325 MG/1
650 TABLET ORAL EVERY 4 HOURS PRN
Status: DISCONTINUED | OUTPATIENT
Start: 2019-01-02 | End: 2019-01-04 | Stop reason: HOSPADM

## 2019-01-02 RX ORDER — LANOLIN 100 %
OINTMENT (GRAM) TOPICAL
Status: DISCONTINUED | OUTPATIENT
Start: 2019-01-02 | End: 2019-01-04 | Stop reason: HOSPADM

## 2019-01-02 RX ORDER — SODIUM CHLORIDE, SODIUM LACTATE, POTASSIUM CHLORIDE, CALCIUM CHLORIDE 600; 310; 30; 20 MG/100ML; MG/100ML; MG/100ML; MG/100ML
INJECTION, SOLUTION INTRAVENOUS CONTINUOUS
Status: DISCONTINUED | OUTPATIENT
Start: 2019-01-02 | End: 2019-01-04 | Stop reason: HOSPADM

## 2019-01-02 RX ORDER — ONDANSETRON 2 MG/ML
4 INJECTION INTRAMUSCULAR; INTRAVENOUS EVERY 6 HOURS PRN
Status: DISCONTINUED | OUTPATIENT
Start: 2019-01-02 | End: 2019-01-04 | Stop reason: HOSPADM

## 2019-01-02 RX ORDER — METHYLERGONOVINE MALEATE 0.2 MG/ML
200 INJECTION INTRAVENOUS
Status: DISCONTINUED | OUTPATIENT
Start: 2019-01-02 | End: 2019-01-04 | Stop reason: HOSPADM

## 2019-01-02 RX ORDER — AMOXICILLIN 250 MG
2 CAPSULE ORAL 2 TIMES DAILY
Status: DISCONTINUED | OUTPATIENT
Start: 2019-01-02 | End: 2019-01-04 | Stop reason: HOSPADM

## 2019-01-02 RX ORDER — IBUPROFEN 400 MG/1
800 TABLET, FILM COATED ORAL
Status: DISCONTINUED | OUTPATIENT
Start: 2019-01-02 | End: 2019-01-02

## 2019-01-02 RX ORDER — OXYTOCIN/0.9 % SODIUM CHLORIDE 30/500 ML
100-340 PLASTIC BAG, INJECTION (ML) INTRAVENOUS CONTINUOUS PRN
Status: COMPLETED | OUTPATIENT
Start: 2019-01-02 | End: 2019-01-02

## 2019-01-02 RX ORDER — AMOXICILLIN 250 MG
1 CAPSULE ORAL 2 TIMES DAILY
Status: DISCONTINUED | OUTPATIENT
Start: 2019-01-02 | End: 2019-01-04 | Stop reason: HOSPADM

## 2019-01-02 RX ORDER — OXYTOCIN 10 [USP'U]/ML
10 INJECTION, SOLUTION INTRAMUSCULAR; INTRAVENOUS
Status: DISCONTINUED | OUTPATIENT
Start: 2019-01-02 | End: 2019-01-04 | Stop reason: HOSPADM

## 2019-01-02 RX ORDER — OXYTOCIN/0.9 % SODIUM CHLORIDE 30/500 ML
100 PLASTIC BAG, INJECTION (ML) INTRAVENOUS CONTINUOUS
Status: DISCONTINUED | OUTPATIENT
Start: 2019-01-02 | End: 2019-01-04 | Stop reason: HOSPADM

## 2019-01-02 RX ORDER — TERBUTALINE SULFATE 1 MG/ML
0.25 INJECTION, SOLUTION SUBCUTANEOUS
Status: DISCONTINUED | OUTPATIENT
Start: 2019-01-02 | End: 2019-01-04 | Stop reason: HOSPADM

## 2019-01-02 RX ORDER — IBUPROFEN 600 MG/1
600 TABLET, FILM COATED ORAL EVERY 6 HOURS PRN
Status: DISCONTINUED | OUTPATIENT
Start: 2019-01-02 | End: 2019-01-04 | Stop reason: HOSPADM

## 2019-01-02 RX ORDER — FENTANYL CITRATE 50 UG/ML
50-100 INJECTION, SOLUTION INTRAMUSCULAR; INTRAVENOUS
Status: DISCONTINUED | OUTPATIENT
Start: 2019-01-02 | End: 2019-01-04 | Stop reason: HOSPADM

## 2019-01-02 RX ORDER — BISACODYL 10 MG
10 SUPPOSITORY, RECTAL RECTAL DAILY PRN
Status: DISCONTINUED | OUTPATIENT
Start: 2019-01-04 | End: 2019-01-04 | Stop reason: HOSPADM

## 2019-01-02 RX ORDER — ACETAMINOPHEN 325 MG/1
650 TABLET ORAL EVERY 4 HOURS PRN
Status: DISCONTINUED | OUTPATIENT
Start: 2019-01-02 | End: 2019-01-02

## 2019-01-02 RX ORDER — SODIUM CHLORIDE, SODIUM LACTATE, POTASSIUM CHLORIDE, CALCIUM CHLORIDE 600; 310; 30; 20 MG/100ML; MG/100ML; MG/100ML; MG/100ML
INJECTION, SOLUTION INTRAVENOUS CONTINUOUS
Status: DISCONTINUED | OUTPATIENT
Start: 2019-01-02 | End: 2019-01-02 | Stop reason: CLARIF

## 2019-01-02 RX ORDER — LIDOCAINE 40 MG/G
CREAM TOPICAL
Status: DISCONTINUED | OUTPATIENT
Start: 2019-01-02 | End: 2019-01-04 | Stop reason: HOSPADM

## 2019-01-02 RX ORDER — ONDANSETRON 2 MG/ML
4 INJECTION INTRAMUSCULAR; INTRAVENOUS EVERY 6 HOURS PRN
Status: DISCONTINUED | OUTPATIENT
Start: 2019-01-02 | End: 2019-01-02 | Stop reason: CLARIF

## 2019-01-02 RX ORDER — NALOXONE HYDROCHLORIDE 0.4 MG/ML
.1-.4 INJECTION, SOLUTION INTRAMUSCULAR; INTRAVENOUS; SUBCUTANEOUS
Status: DISCONTINUED | OUTPATIENT
Start: 2019-01-02 | End: 2019-01-02

## 2019-01-02 RX ORDER — CARBOPROST TROMETHAMINE 250 UG/ML
250 INJECTION, SOLUTION INTRAMUSCULAR
Status: DISCONTINUED | OUTPATIENT
Start: 2019-01-02 | End: 2019-01-04 | Stop reason: HOSPADM

## 2019-01-02 RX ORDER — EPHEDRINE SULFATE 50 MG/ML
5 INJECTION, SOLUTION INTRAMUSCULAR; INTRAVENOUS; SUBCUTANEOUS
Status: DISCONTINUED | OUTPATIENT
Start: 2019-01-02 | End: 2019-01-04 | Stop reason: HOSPADM

## 2019-01-02 RX ORDER — OXYCODONE AND ACETAMINOPHEN 5; 325 MG/1; MG/1
1 TABLET ORAL
Status: DISCONTINUED | OUTPATIENT
Start: 2019-01-02 | End: 2019-01-02

## 2019-01-02 RX ORDER — HYDROCORTISONE 2.5 %
CREAM (GRAM) TOPICAL 3 TIMES DAILY PRN
Status: DISCONTINUED | OUTPATIENT
Start: 2019-01-02 | End: 2019-01-04 | Stop reason: HOSPADM

## 2019-01-02 RX ORDER — NALBUPHINE HYDROCHLORIDE 10 MG/ML
2.5-5 INJECTION, SOLUTION INTRAMUSCULAR; INTRAVENOUS; SUBCUTANEOUS EVERY 6 HOURS PRN
Status: DISCONTINUED | OUTPATIENT
Start: 2019-01-02 | End: 2019-01-04 | Stop reason: HOSPADM

## 2019-01-02 RX ORDER — ROPIVACAINE HYDROCHLORIDE 2 MG/ML
10 INJECTION, SOLUTION EPIDURAL; INFILTRATION; PERINEURAL ONCE
Status: COMPLETED | OUTPATIENT
Start: 2019-01-02 | End: 2019-01-02

## 2019-01-02 RX ORDER — CARBOPROST TROMETHAMINE 250 UG/ML
250 INJECTION, SOLUTION INTRAMUSCULAR
Status: DISCONTINUED | OUTPATIENT
Start: 2019-01-02 | End: 2019-01-02

## 2019-01-02 RX ORDER — NALOXONE HYDROCHLORIDE 0.4 MG/ML
.1-.4 INJECTION, SOLUTION INTRAMUSCULAR; INTRAVENOUS; SUBCUTANEOUS
Status: DISCONTINUED | OUTPATIENT
Start: 2019-01-02 | End: 2019-01-04 | Stop reason: HOSPADM

## 2019-01-02 RX ORDER — MISOPROSTOL 200 UG/1
800 TABLET ORAL
Status: DISCONTINUED | OUTPATIENT
Start: 2019-01-02 | End: 2019-01-04 | Stop reason: HOSPADM

## 2019-01-02 RX ORDER — OXYCODONE HYDROCHLORIDE 5 MG/1
5 TABLET ORAL EVERY 4 HOURS PRN
Status: DISCONTINUED | OUTPATIENT
Start: 2019-01-02 | End: 2019-01-04 | Stop reason: HOSPADM

## 2019-01-02 RX ORDER — OXYTOCIN/0.9 % SODIUM CHLORIDE 30/500 ML
340 PLASTIC BAG, INJECTION (ML) INTRAVENOUS CONTINUOUS PRN
Status: DISCONTINUED | OUTPATIENT
Start: 2019-01-02 | End: 2019-01-04 | Stop reason: HOSPADM

## 2019-01-02 RX ORDER — METHYLERGONOVINE MALEATE 0.2 MG/ML
200 INJECTION INTRAVENOUS
Status: COMPLETED | OUTPATIENT
Start: 2019-01-02 | End: 2019-01-02

## 2019-01-02 RX ADMIN — Medication 12 ML/HR: at 15:49

## 2019-01-02 RX ADMIN — IBUPROFEN 600 MG: 600 TABLET ORAL at 22:20

## 2019-01-02 RX ADMIN — Medication 5 MG: at 19:34

## 2019-01-02 RX ADMIN — OXYTOCIN-SODIUM CHLORIDE 0.9% IV SOLN 30 UNIT/500ML 340 ML/HR: 30-0.9/5 SOLUTION at 19:47

## 2019-01-02 RX ADMIN — ROPIVACAINE HYDROCHLORIDE 10 ML: 2 INJECTION, SOLUTION EPIDURAL; INFILTRATION at 15:43

## 2019-01-02 RX ADMIN — Medication 5 MG: at 16:52

## 2019-01-02 RX ADMIN — ONDANSETRON 4 MG: 2 INJECTION INTRAMUSCULAR; INTRAVENOUS at 21:05

## 2019-01-02 RX ADMIN — SODIUM CHLORIDE, POTASSIUM CHLORIDE, SODIUM LACTATE AND CALCIUM CHLORIDE: 600; 310; 30; 20 INJECTION, SOLUTION INTRAVENOUS at 08:35

## 2019-01-02 RX ADMIN — METHYLERGONOVINE MALEATE 200 MCG: 0.2 INJECTION INTRAVENOUS at 19:51

## 2019-01-02 RX ADMIN — SODIUM CHLORIDE, POTASSIUM CHLORIDE, SODIUM LACTATE AND CALCIUM CHLORIDE: 600; 310; 30; 20 INJECTION, SOLUTION INTRAVENOUS at 17:42

## 2019-01-02 RX ADMIN — FENTANYL CITRATE 100 MCG: 50 INJECTION, SOLUTION INTRAMUSCULAR; INTRAVENOUS at 13:52

## 2019-01-02 RX ADMIN — ACETAMINOPHEN 650 MG: 325 TABLET, FILM COATED ORAL at 22:20

## 2019-01-02 RX ADMIN — SODIUM CHLORIDE, POTASSIUM CHLORIDE, SODIUM LACTATE AND CALCIUM CHLORIDE 1000 ML: 600; 310; 30; 20 INJECTION, SOLUTION INTRAVENOUS at 15:00

## 2019-01-02 RX ADMIN — OXYTOCIN-SODIUM CHLORIDE 0.9% IV SOLN 30 UNIT/500ML 2 MILLI-UNITS/MIN: 30-0.9/5 SOLUTION at 09:27

## 2019-01-02 ASSESSMENT — ENCOUNTER SYMPTOMS
DYSRHYTHMIAS: 0
SEIZURES: 0

## 2019-01-02 ASSESSMENT — MIFFLIN-ST. JEOR: SCORE: 1597.25

## 2019-01-02 NOTE — H&P
[facility]    History and Physical  Obstetrics and Gynecology     Date of Admission:  2019    Assessment & Plan   Amina ROPER is a 28 year old [id number] at 39wga here for elective IOL with history of  and C/S for breech.     ASSESSMENT:   IUP @ 39w0d  TOLAC consent signed.      PLAN:   Will augment with oxytocin      iBta Marcelino MD    History of Present Illness   Amina ROPER is a 28 year old female  39w0d  Estimated Date of Delivery: 2019 is calculated from Patient's last menstrual period was 2018. [facility].    PRENATAL COURSE  Prenatal course was complicated by prior  section      Recent Labs   Lab Test 19  0752   ABO AB   RH Pos   AS Neg     Rhogam not indicated   Recent Labs   Lab Test 18  1445 18  1040   HEPBANG  --  Nonreactive   HIAGAB  --  Nonreactive   GBS Negative  --    RUQIGG  --  12         Prior to Admission Medications   Prior to Admission Medications   Prescriptions Last Dose Informant Patient Reported? Taking?   Prenatal Vit-Fe Fumarate-FA (PRENATAL PO) 2019 at Unknown time  Yes Yes   omeprazole (PRILOSEC) 20 MG CR capsule 2019 at Unknown time  No Yes   Sig: Take 1 capsule (20 mg) by mouth daily      Facility-Administered Medications: None     Allergies   No Known Allergies      Immunization History   Immunization History   Administered Date(s) Administered     Hpv, Unspecified  2012     Influenza Vaccine IM 3yrs+ 4 Valent IIV4 10/19/2018     TDAP Vaccine (Adacel) 10/19/2018     Tdap (Adult) Unspecified Formulation 2014, 2016       Past Medical History:   Diagnosis Date     Anorexia      Bulimia      Depressive disorder     PP anxiety with last baby; was on Zoloft       Past Surgical History:   Procedure Laterality Date     APPENDECTOMY        SECTION       ORTHOPEDIC SURGERY      wrist surgey       Vital signs:  Temp: 98.5  F  "(36.9  C) Temp src: Temporal BP: 109/57 Pulse: 72   Resp: 16       Height: 170.2 cm (5' 7\") Weight: 83.5 kg (184 lb)  Estimated body mass index is 28.82 kg/m  as calculated from the following:    Height as of this encounter: 1.702 m (5' 7\").    Weight as of this encounter: 83.5 kg (184 lb).          FHT: 140bpm/ Mod kathrine/+ accels/ no decels  Sorrel: irritability  Abdomen: gravid, single vertex fetus, non-tender, EFW 7 lbs   SVE:2/50/-2 FSE placed  Constitutional: healthy, alert, active and no distress   Extremities: NT, no edema  Neurologic: Awake, alert, oriented x3  Neuropsychiatric: General: normal, calm and normal eye contact  Heart: well perfused extremities  Lungs: non-labored respirations    Bita Marcelino MD  "

## 2019-01-02 NOTE — ANESTHESIA PROCEDURE NOTES
Peripheral nerve/Neuraxial procedure note : epidural catheter  Pre-Procedure  Performed by Dat Price MD  Location: OB      Pre-Anesthestic Checklist: patient identified, IV checked, risks and benefits discussed, informed consent, monitors and equipment checked, pre-op evaluation and at physician/surgeon's request    Timeout  Correct Patient: Yes   Correct Procedure: Yes   Correct Site: Yes   Correct Laterality: N/A   Correct Position: Yes   Site Marked: N/A   .   Procedure Documentation    .    Procedure:    Epidural catheter.  Insertion Site:L3-4  (midline approach) Injection technique: LORT saline   Local skin infiltrated with 3 mL of 1% lidocaine.  VALENTINA at 5 cm     Patient Prep;mask, sterile gloves, povidone-iodine 7.5% surgical scrub, patient draped.  .  Needle: Touhy needle Needle Gauge: 17.    Needle Length (Inches) 3.5  # of attempts: 1 and # of redirects: : 0. .   Catheter: 19 G . .  Catheter threaded easily  3 cm epidural space.  8 cm at skin.   .    Assessment/Narrative  Paresthesias: No.  .  .  Aspiration negative for heme or CSF  . Test dose of 3 mL lidocaine 1.5% w/ 1:200,000 epinephrine at 15:40.  Test dose negative for signs of intravascular, subdural or intrathecal injection. Comments:  Pt tolerated well.   Taped sterile and secure.   FHTs stable post-procedure.   No complications.

## 2019-01-02 NOTE — PLAN OF CARE
Pt admitted to [facility] for Induction/TOLAC.  EUM/US applied. VSS. Admission database obtained and prenatal information reviewed.    Dr. Marcelino called to state she would come up to obtaine Informed Consent as soon as her surgery was over.    0835: IV placed.    0905: MD at bedside to obtain Informed Consent.    0915: AROM done by MD with [place]. Unsure if successful, so SE placed by MD.    0927: Pitocin started    1230: SVE with no change notrd.    1345: SVE with no change noted. Pt uncomfortable; options discussed    1352: IV Fentanyl given    1415: Update to Dr. Marcelino in dept.    1440: Dr. Marcelino at bedside for SVE; Pt requesting epidural; fluid bolus initiated.    1530: MDA at bedside for epidural placement    1630: SVE with no change noted    1645: Germain placed.    1705: Update to Dr. Marcelino    1900: SVE; Pt complete. Update to Dr. Marcelino. MD to come for delivery.    1915: Report to Eze Palm RN. Dr. Marcelino arrived for delivery.  Pt positioned for delivery to start pushig soon.

## 2019-01-02 NOTE — ANESTHESIA PREPROCEDURE EVALUATION
"Anesthesia Pre-Procedure Evaluation    Patient: Amina ROPER   MRN: [id number] : 1990          Preoperative Diagnosis: IUP/Labor     Procedure: ELIZABETH     Past Medical History:   Diagnosis Date     Anorexia      Bulimia      Depressive disorder     PP anxiety with last baby; was on Zoloft     Past Surgical History:   Procedure Laterality Date     APPENDECTOMY        SECTION       ORTHOPEDIC SURGERY      wrist surgey       Anesthesia Evaluation     . Pt has had prior anesthetic. Type: Regional    No history of anesthetic complications          ROS/MED HX    ENT/Pulmonary:      (-) asthma and sleep apnea   Neurologic:      (-) seizures and CVA   Cardiovascular:        (-) hypertension, syncope, arrhythmias and irregular heartbeat/palpitations   METS/Exercise Tolerance:     Hematologic: Comments: Denies EZ bleeding/bruising/blood thinner use        Musculoskeletal:         GI/Hepatic:     (+) GERD      (-) liver disease   Renal/Genitourinary:      (-) renal disease   Endo:      (-) Type II DM and thyroid disease   Psychiatric:     (+) psychiatric history anxiety and depression      Infectious Disease:         Malignancy:         Other:                                 Lab Results   Component Value Date    WBC 8.7 2019    HGB 10.6 (L) 2019    HCT 32.5 (L) 2019     2019    HCG Negative 2006       Preop Vitals  BP Readings from Last 3 Encounters:   19 103/58   18 106/58   18 107/52    Pulse Readings from Last 3 Encounters:   19 78   18 83   18 70      Resp Readings from Last 3 Encounters:   19 16   18 16   18 14    SpO2 Readings from Last 3 Encounters:   19 100%   18 96%   18 98%      Temp Readings from Last 1 Encounters:   19 36.7  C (98.1  F) (Temporal)    Ht Readings from Last 1 Encounters:   19 1.702 m (5' 7\")      Wt Readings from Last 1 Encounters:   19 83.5 kg (184 " "lb)    Estimated body mass index is 28.82 kg/m  as calculated from the following:    Height as of this encounter: 1.702 m (5' 7\").    Weight as of this encounter: 83.5 kg (184 lb).       Anesthesia Plan      History & Physical Review      ASA Status:  2 .        Plan for Epidural          Postoperative Care      Consents  Anesthetic plan, risks, benefits and alternatives discussed with:  Patient and Spouse..                 Dat Price MD  "

## 2019-01-02 NOTE — PROGRESS NOTES
"[facility] Labor and Delivery Progress Note    Amina ROPER MRN# [id number]   Age: 28 year old YOB: 1990           Subjective:     Getting more uncomfortable with her contractions       Objective:     Patient Vitals for the past 8 hrs:   BP Temp Temp src Pulse Resp Height Weight   19 1315 109/57 98.6  F (37  C) Temporal -- -- -- --   19 1200 107/61 98.5  F (36.9  C) Temporal -- -- -- --   19 1100 -- 98.3  F (36.8  C) Temporal -- -- -- --   19 1000 110/59 98.7  F (37.1  C) Temporal 78 16 -- --   19 0746 109/57 98.5  F (36.9  C) Temporal 72 16 1.702 m (5' 7\") 83.5 kg (184 lb)        Cervical Exam: 3    / -1       Position: Mid    Membranes: Ruptured (amnih[place] and SE; not a lot of fluid seen)     Fetal Heart Rate:    Monitor:   internal   Variability:   moderate   Baseline Rate:   130bpm   Fetal Heart Rate Tracing: Cat I and reassuring. No decels.           Assessment:   Amina ROPER is a 28 year old  who is 39w0d here with IOL with history of prior  section for breech and prior  with her first pregnancy          Plan:   Continue to augment with oxytocin. Reassuring fetal status. Ready for epidural. Questions answered about the length of the process. Reasurance given about her course thus far and also about her ability to always request a  section.     Bita Marcelino MD    "

## 2019-01-03 LAB
HGB BLD-MCNC: 10.1 G/DL (ref 11.7–15.7)
T PALLIDUM AB SER QL: NONREACTIVE

## 2019-01-03 PROCEDURE — 12000035 ZZH R&B POSTPARTUM

## 2019-01-03 PROCEDURE — 25000132 ZZH RX MED GY IP 250 OP 250 PS 637: Performed by: OBSTETRICS & GYNECOLOGY

## 2019-01-03 PROCEDURE — 36415 COLL VENOUS BLD VENIPUNCTURE: CPT | Performed by: OBSTETRICS & GYNECOLOGY

## 2019-01-03 PROCEDURE — 85018 HEMOGLOBIN: CPT | Performed by: OBSTETRICS & GYNECOLOGY

## 2019-01-03 RX ADMIN — ACETAMINOPHEN 650 MG: 325 TABLET, FILM COATED ORAL at 21:45

## 2019-01-03 RX ADMIN — SENNOSIDES AND DOCUSATE SODIUM 1 TABLET: 8.6; 5 TABLET ORAL at 19:52

## 2019-01-03 RX ADMIN — IBUPROFEN 600 MG: 600 TABLET ORAL at 09:30

## 2019-01-03 RX ADMIN — SENNOSIDES AND DOCUSATE SODIUM 1 TABLET: 8.6; 5 TABLET ORAL at 08:22

## 2019-01-03 RX ADMIN — IBUPROFEN 600 MG: 600 TABLET ORAL at 15:41

## 2019-01-03 RX ADMIN — ACETAMINOPHEN 650 MG: 325 TABLET, FILM COATED ORAL at 09:30

## 2019-01-03 RX ADMIN — ACETAMINOPHEN 650 MG: 325 TABLET, FILM COATED ORAL at 02:39

## 2019-01-03 RX ADMIN — ACETAMINOPHEN 650 MG: 325 TABLET, FILM COATED ORAL at 15:41

## 2019-01-03 RX ADMIN — IBUPROFEN 600 MG: 600 TABLET ORAL at 21:45

## 2019-01-03 RX ADMIN — IBUPROFEN 600 MG: 600 TABLET ORAL at 02:39

## 2019-01-03 NOTE — PLAN OF CARE
Patient having complaints of mild uterine cramping.  Taking ibuprofen and tylenol when able.  Fundus firm below umbilicus, lochia small, no clots.  IV removed.  Assisted with checking latch during breast feeding.  Lanolin cream given for sore nipples.  Will continue to monitor.

## 2019-01-03 NOTE — PLAN OF CARE
Pt has scant lochia, fundus firm U/1, no large clots passed, +void, breastfeeding well, ambulating, pain controled with tyl/ibup

## 2019-01-03 NOTE — L&D DELIVERY NOTE
Amina is a 29 yo  admitted at 39+0wga admitted for an elective induction of labor. Prenatal care was with me. Pregnancy was significant for a history of one prior  section for breech presentation. Prior to this she had one vaginal delivery. She was induced with amniotomy with an FSE and oxytocin augmentation. She progressed to complete dilation after 10 hours. She delivered a viable male infant in cephalic presentation. Apgars were 8,9. Delayed cord clamping was done. Her placenta delivered via external massage and traction. She had a large gush of blood and clots were evacuated from the lower uterine segment. She received one dose of IM Methergine X 1. QBL was 538 ml not a postpartum hemorrhage. She sustained a second degree perineal laceration that was repaired with 2-0 chromic. She had a hemostatic right periurethral laceration that was not repaired.  Routine PP cares.    Bita Marcelino MD

## 2019-01-03 NOTE — PROGRESS NOTES
"[facility] Obstetrics Post-Partum Progress Note          Assessment and Plan:    Assessment:   Post-partum day #1  Vaginal Birth After  Section  L&D complications: None      Doing well.  No excessive bleeding  Pain well-controlled.      Plan:   Ambulation encouraged  Awaiting AM heoglobin           Interval History:   Doing well. Denies pain. Bleeding is like heavy menses. Infant is doing well at bedside. Breastfeeding.          Significant Problems:    None          Review of Systems:    The Review of Systems is negative other than noted in the HPI          Medications:     Current Facility-Administered Medications   Medication     acetaminophen (TYLENOL) tablet 650 mg     [START ON 2019] bisacodyl (DULCOLAX) Suppository 10 mg     carboprost (HEMABATE) injection 250 mcg     ePHEDrine injection 5 mg     fentaNYL (PF) (SUBLIMAZE) injection  mcg     fentaNYL (SUBLIMAZE) 2 mcg/mL, ropivacaine (NAROPIN) 0.2% in NaCl 0.9% EPIDURAL infusion     hydrocortisone 2.5 % cream     ibuprofen (ADVIL/MOTRIN) tablet 600 mg     lactated ringers BOLUS 1,000 mL     lactated ringers BOLUS 250 mL     lactated ringers BOLUS 500 mL     lactated ringers infusion     lactated ringers infusion     lanolin ointment     lidocaine (LMX4) cream     lidocaine 1 % 0.1-20 mL     lidocaine 1 % 1 mL     [START ON 2019] magnesium hydroxide (MILK OF MAGNESIA) suspension 30 mL     medication instruction     medication instruction     Medication Instructions: misoprostol (CYTOTEC)- Nurse to discuss ordering with provider, if needed. Ordered via \"OB misoprostol (CYTOTEC) Postpartum Hemorrhage PANEL\"     methylergonovine (METHERGINE) injection 200 mcg     misoprostol (CYTOTEC) tablet 800 mcg     nalbuphine (NUBAIN) injection 2.5-5 mg     naloxone (NARCAN) injection 0.1-0.4 mg     nitrous oxide/oxygen 50/50 blend     No MMR Needed - Assessment: Patient does not need MMR vaccine     NO Rho (D) immune globulin (RhoGam) needed - " mother Rh POSITIVE     No Tdap Needed - Assessment: Patient does not need Tdap vaccine     ondansetron (ZOFRAN) injection 4 mg     Opioid plan postpartum - medication instruction     oxyCODONE (ROXICODONE) tablet 5 mg     oxytocin (PITOCIN) 30 units in 500 mL 0.9% NaCl infusion     oxytocin (PITOCIN) 30 units in 500 mL 0.9% NaCl infusion     oxytocin (PITOCIN) injection 10 Units     oxytocin (PITOCIN) injection 10 Units     senna-docusate (SENOKOT-S/PERICOLACE) 8.6-50 MG per tablet 1 tablet    Or     senna-docusate (SENOKOT-S/PERICOLACE) 8.6-50 MG per tablet 2 tablet     sodium chloride (PF) 0.9% PF flush 3 mL     sodium chloride (PF) 0.9% PF flush 3 mL     [START ON 1/4/2019] sodium phosphate (FLEET ENEMA) 1 enema     terbutaline (BRETHINE) injection 0.25 mg     tranexamic acid (CYKLOKAPRON) Bolus 1g vial attach to NaCl 50 or 100 mL bag ADULT             Physical Exam:     Patient Vitals for the past 24 hrs:   BP Temp Temp src Pulse Resp SpO2   01/03/19 0300 109/60 97.8  F (36.6  C) Oral 63 16 --   01/03/19 0000 106/69 98.1  F (36.7  C) Oral 68 16 --   01/02/19 2145 112/74 -- -- -- -- 95 %   01/02/19 2130 109/54 -- -- -- 16 96 %   01/02/19 2115 113/59 98.9  F (37.2  C) Temporal -- 18 97 %   01/02/19 2100 111/51 -- -- -- -- 96 %   01/02/19 2045 118/57 -- -- -- -- 96 %   01/02/19 2030 108/72 -- -- -- -- 95 %   01/02/19 2015 107/56 -- -- -- -- 95 %   01/02/19 2000 92/58 -- -- -- -- --   01/02/19 1952 102/51 -- -- -- -- --   01/02/19 1950 98/51 -- -- -- -- --   01/02/19 1948 117/57 -- -- -- -- --   01/02/19 1946 144/82 -- -- -- -- --   01/02/19 1942 -- -- -- -- -- 99 %   01/02/19 1940 111/58 -- -- -- -- --   01/02/19 1938 116/59 -- -- -- -- --   01/02/19 1934 (!) 67/38 -- -- -- -- --   01/02/19 1932 -- -- -- -- -- 91 %   01/02/19 1931 90/42 -- -- -- -- --   01/02/19 1900 119/62 99.2  F (37.3  C) Temporal -- -- 97 %   01/02/19 1830 122/83 -- -- -- -- 97 %   01/02/19 1800 106/58 98.5  F (36.9  C) Temporal -- -- 98 %    01/02/19 1730 115/57 98.8  F (37.1  C) Temporal -- -- 99 %   01/02/19 1725 99/58 -- -- -- -- 98 %   01/02/19 1720 109/63 -- -- -- -- 99 %   01/02/19 1715 106/83 -- -- -- -- 99 %   01/02/19 1710 107/56 -- -- -- -- 99 %   01/02/19 1705 97/55 -- -- -- -- 99 %   01/02/19 1703 104/56 -- -- -- -- --   01/02/19 1700 95/53 -- -- -- -- 99 %   01/02/19 1659 91/53 -- -- -- -- --   01/02/19 1657 93/54 -- -- -- -- 99 %   01/02/19 1655 94/50 -- -- -- -- --   01/02/19 1652 (!) 87/51 -- -- -- -- 99 %   01/02/19 1651 (!) 89/61 -- -- -- -- --   01/02/19 1645 98/57 -- -- -- -- 99 %   01/02/19 1640 112/56 -- -- -- -- 100 %   01/02/19 1635 (!) 86/52 -- -- -- -- 100 %   01/02/19 1630 97/55 -- -- -- -- 99 %   01/02/19 1625 102/57 -- -- -- -- 98 %   01/02/19 1620 99/54 -- -- -- -- 98 %   01/02/19 1615 103/58 98.1  F (36.7  C) Temporal -- -- 100 %   01/02/19 1610 110/56 -- -- -- -- 100 %   01/02/19 1605 103/58 -- -- -- -- 99 %   01/02/19 1604 108/56 -- -- -- -- --   01/02/19 1602 99/51 -- -- -- -- 97 %   01/02/19 1600 106/53 -- -- -- -- --   01/02/19 1558 99/57 -- -- -- -- --   01/02/19 1555 101/51 -- -- -- -- 98 %   01/02/19 1554 (!) 83/45 -- -- -- -- --   01/02/19 1552 98/52 -- -- -- -- 98 %   01/02/19 1550 108/52 -- -- -- -- --   01/02/19 1548 113/58 -- -- -- -- 99 %   01/02/19 1546 113/72 -- -- -- -- --   01/02/19 1544 120/61 -- -- -- -- --   01/02/19 1542 121/69 -- -- -- -- 97 %   01/02/19 1540 120/63 -- -- -- -- --   01/02/19 1520 119/65 98.7  F (37.1  C) Temporal -- -- --   01/02/19 1315 109/57 98.6  F (37  C) Temporal -- -- --   01/02/19 1200 107/61 98.5  F (36.9  C) Temporal -- -- --   01/02/19 1100 -- 98.3  F (36.8  C) Temporal -- -- --   01/02/19 1000 110/59 98.7  F (37.1  C) Temporal 78 16 --   GEN: NAD  CV: RRR  RESP: CTAB  Uterine fundus is firm, non-tender and at the level of the umbilicus  Ext: non-tender          Data:     Hemoglobin   Date Value Ref Range Status   01/02/2019 10.6 (L) 11.7 - 15.7 g/dL Final   10/19/2018  11.5 (L) 11.7 - 15.7 g/dL Final   06/13/2018 13.9 11.7 - 15.7 g/dL Final   09/30/2006 14.0 11.7 - 15.7 g/dL Final     -    Bita Marcelino MD

## 2019-01-03 NOTE — LACTATION NOTE
Attempted to see this morning; visitors present. Initial visit this afternoon, Amina states that breastfeeding has been going well. 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 Feeding on demand and has questions about how to deal with engorgement - felt as though she had oversupply with last child. Encouraged frequent feedings while engorged, hand expression if able, and pumping if needed for a few minutes to soften breasts. States that she didn't need me to visualize a feeding as she's feeling comfortable. Will call with any questions.    Maricruz Arreola, CHASE, IBCLC

## 2019-01-04 VITALS
HEART RATE: 63 BPM | WEIGHT: 184 LBS | BODY MASS INDEX: 28.88 KG/M2 | DIASTOLIC BLOOD PRESSURE: 67 MMHG | RESPIRATION RATE: 16 BRPM | OXYGEN SATURATION: 95 % | SYSTOLIC BLOOD PRESSURE: 105 MMHG | TEMPERATURE: 98.2 F | HEIGHT: 67 IN

## 2019-01-04 PROCEDURE — 25000132 ZZH RX MED GY IP 250 OP 250 PS 637: Performed by: OBSTETRICS & GYNECOLOGY

## 2019-01-04 RX ORDER — IBUPROFEN 600 MG/1
600 TABLET, FILM COATED ORAL EVERY 6 HOURS PRN
Qty: 120 TABLET | Refills: 0 | Status: SHIPPED | OUTPATIENT
Start: 2019-01-04 | End: 2020-09-14

## 2019-01-04 RX ADMIN — ACETAMINOPHEN 650 MG: 325 TABLET, FILM COATED ORAL at 04:28

## 2019-01-04 RX ADMIN — SENNOSIDES AND DOCUSATE SODIUM 1 TABLET: 8.6; 5 TABLET ORAL at 10:14

## 2019-01-04 RX ADMIN — IBUPROFEN 600 MG: 600 TABLET ORAL at 10:14

## 2019-01-04 RX ADMIN — ACETAMINOPHEN 650 MG: 325 TABLET, FILM COATED ORAL at 10:14

## 2019-01-04 RX ADMIN — IBUPROFEN 600 MG: 600 TABLET ORAL at 04:28

## 2019-01-04 NOTE — PLAN OF CARE
Patient doing well, excited to go home.  Hydrogels given to alternate with lanolin cream and sore nipple shells.  Patient stated she ended up with cracked nipples with previous infant.  Patient states she is tender but not noticing any open areas.  Milk is coming in per patient.  Prescription for ibuprofen given for home.  Discharge instructions explained to patient and all questions/concerns addressed.

## 2019-01-04 NOTE — PROGRESS NOTES
"[facility] Obstetrics Post-Partum Progress Note          Assessment and Plan:    Assessment:   Post-partum day #2  Vaginal delivery after   L&D complications: None      Doing well.  No excessive bleeding  Pain well-controlled.      Plan:   Discharge later today  Contraceptive counseling given           Interval History:   Doing well. Bleeding has slowed to less than menses. Breastfeeding. No other concerns.          Significant Problems:    None          Review of Systems:    The Review of Systems is negative other than noted in the HPI          Medications:     Current Facility-Administered Medications   Medication     acetaminophen (TYLENOL) tablet 650 mg     bisacodyl (DULCOLAX) Suppository 10 mg     carboprost (HEMABATE) injection 250 mcg     ePHEDrine injection 5 mg     fentaNYL (PF) (SUBLIMAZE) injection  mcg     fentaNYL (SUBLIMAZE) 2 mcg/mL, ropivacaine (NAROPIN) 0.2% in NaCl 0.9% EPIDURAL infusion     hydrocortisone 2.5 % cream     ibuprofen (ADVIL/MOTRIN) tablet 600 mg     lactated ringers BOLUS 1,000 mL     lactated ringers BOLUS 250 mL     lactated ringers BOLUS 500 mL     lactated ringers infusion     lactated ringers infusion     lanolin ointment     lidocaine (LMX4) cream     lidocaine 1 % 0.1-20 mL     lidocaine 1 % 1 mL     magnesium hydroxide (MILK OF MAGNESIA) suspension 30 mL     medication instruction     medication instruction     Medication Instructions: misoprostol (CYTOTEC)- Nurse to discuss ordering with provider, if needed. Ordered via \"OB misoprostol (CYTOTEC) Postpartum Hemorrhage PANEL\"     methylergonovine (METHERGINE) injection 200 mcg     misoprostol (CYTOTEC) tablet 800 mcg     nalbuphine (NUBAIN) injection 2.5-5 mg     naloxone (NARCAN) injection 0.1-0.4 mg     nitrous oxide/oxygen 50/50 blend     No MMR Needed - Assessment: Patient does not need MMR vaccine     NO Rho (D) immune globulin (RhoGam) needed - mother Rh POSITIVE     No Tdap Needed - Assessment: " Patient does not need Tdap vaccine     ondansetron (ZOFRAN) injection 4 mg     Opioid plan postpartum - medication instruction     oxyCODONE (ROXICODONE) tablet 5 mg     oxytocin (PITOCIN) 30 units in 500 mL 0.9% NaCl infusion     oxytocin (PITOCIN) 30 units in 500 mL 0.9% NaCl infusion     oxytocin (PITOCIN) injection 10 Units     oxytocin (PITOCIN) injection 10 Units     senna-docusate (SENOKOT-S/PERICOLACE) 8.6-50 MG per tablet 1 tablet    Or     senna-docusate (SENOKOT-S/PERICOLACE) 8.6-50 MG per tablet 2 tablet     sodium chloride (PF) 0.9% PF flush 3 mL     sodium phosphate (FLEET ENEMA) 1 enema     terbutaline (BRETHINE) injection 0.25 mg     tranexamic acid (CYKLOKAPRON) Bolus 1g vial attach to NaCl 50 or 100 mL bag ADULT             Physical Exam:     Patient Vitals for the past 24 hrs:   BP Temp Temp src Pulse Resp   01/04/19 0000 112/62 97.6  F (36.4  C) Oral 72 16   01/03/19 1531 108/67 97.4  F (36.3  C) Oral 80 16     Uterine fundus is firm, non-tender and at the level of the umbilicus          Data:     Hemoglobin   Date Value Ref Range Status   01/03/2019 10.1 (L) 11.7 - 15.7 g/dL Final   01/02/2019 10.6 (L) 11.7 - 15.7 g/dL Final   10/19/2018 11.5 (L) 11.7 - 15.7 g/dL Final   06/13/2018 13.9 11.7 - 15.7 g/dL Final   09/30/2006 14.0 11.7 - 15.7 g/dL Final     -    Bita Marcelino MD

## 2019-01-04 NOTE — LACTATION NOTE
Follow up visit.  Infant is starting to feed better.  He does take a little while to latch but then feeds well.  Encouraged Amina to not give the pacifier any more until her milk comes in.  Explained that he may be having difficulty because of it.  She had no concerns today.  Reviewed signs infant is getting enough and process of milk coming in.  Amina had a good supply with her others.    Reviewed outpatient lactation resources for follow up when discharged to home.  Lactation is available at pediatrician's office.     She reports her nipples are intact and not sore today.     Maddy Gramajo  RN, IBCLC

## 2019-01-04 NOTE — PLAN OF CARE
VSS, breastfeeding,  fundus is firm at U/2, scant flow, no clots.  Pain controlled with Tylenol, Ibuprofen and tucks pads.  Independent with cares.  Will continue to monitor and support.

## 2019-01-04 NOTE — ANESTHESIA POSTPROCEDURE EVALUATION
Patient: Amina ROPER    * No procedures listed *    Diagnosis:* No pre-op diagnosis entered *  Diagnosis Additional Information: No value filed.    Anesthesia Type:  No value filed.    Note:  Anesthesia Post Evaluation    Patient location during evaluation: Floor and Bedside (Postpartum Unit)  Patient participation: Able to fully participate in evaluation  Level of consciousness: awake and alert  Pain management: adequate  Airway patency: patent  Cardiovascular status: acceptable and hemodynamically stable  Respiratory status: acceptable, nonlabored ventilation and unassisted  Hydration status: acceptable  PONV: none       Comments: Pt denies epidural-related complaints.         Last vitals:  Vitals:    01/03/19 0300 01/03/19 0821 01/03/19 1531   BP: 109/60 117/56 108/67   Pulse: 63  80   Resp: 16 16 16   Temp: 36.6  C (97.8  F) 36.6  C (97.8  F) 36.3  C (97.4  F)   SpO2:            Electronically Signed By: Dat Price MD  January 3, 2019  11:42 PM

## 2019-01-05 ENCOUNTER — DOCUMENTATION ONLY (OUTPATIENT)
Dept: CARE COORDINATION | Facility: CLINIC | Age: 29
End: 2019-01-05

## 2019-01-05 NOTE — PROGRESS NOTES
[facility] will be sharing updates with you on Maternal Child Health Referral requests for home care services.  This is for care coordination purposes and alert you to referral status.  We received the referral for  Amina ROPER; MRN [id number] and want to update you:    [facility] is unable to see patient for postpartum/  assessment and education due to patient insurance not contracted with [facility] for this service.  [facility].  Patient advised to contact their insurance provider to determine if service is covered through another homecare agency. Offered option of private pay nurse assessment and education for mom or baby at service rate of 150.00 per visit or 180.00 for both.  Provided call back information if private pay visit is requested.   LEFT  VOICEMAIL AND TEXT SPECIFY.    Sincerely [facility]  Emmett Maldonado  [phone]

## 2019-02-14 ENCOUNTER — PRENATAL OFFICE VISIT (OUTPATIENT)
Dept: OBGYN | Facility: CLINIC | Age: 29
End: 2019-02-14
Payer: COMMERCIAL

## 2019-02-14 VITALS — BODY MASS INDEX: 26.63 KG/M2 | DIASTOLIC BLOOD PRESSURE: 60 MMHG | SYSTOLIC BLOOD PRESSURE: 108 MMHG | WEIGHT: 170 LBS

## 2019-02-14 PROCEDURE — 99207 ZZC POST PARTUM EXAM: CPT | Performed by: OBSTETRICS & GYNECOLOGY

## 2019-02-14 RX ORDER — ACETAMINOPHEN AND CODEINE PHOSPHATE 120; 12 MG/5ML; MG/5ML
0.35 SOLUTION ORAL DAILY
Qty: 84 TABLET | Refills: 4 | Status: SHIPPED | OUTPATIENT
Start: 2019-02-14 | End: 2019-05-15

## 2019-02-14 ASSESSMENT — ANXIETY QUESTIONNAIRES
5. BEING SO RESTLESS THAT IT IS HARD TO SIT STILL: NOT AT ALL
6. BECOMING EASILY ANNOYED OR IRRITABLE: NOT AT ALL
2. NOT BEING ABLE TO STOP OR CONTROL WORRYING: NOT AT ALL
3. WORRYING TOO MUCH ABOUT DIFFERENT THINGS: NOT AT ALL
GAD7 TOTAL SCORE: 0
IF YOU CHECKED OFF ANY PROBLEMS ON THIS QUESTIONNAIRE, HOW DIFFICULT HAVE THESE PROBLEMS MADE IT FOR YOU TO DO YOUR WORK, TAKE CARE OF THINGS AT HOME, OR GET ALONG WITH OTHER PEOPLE: NOT DIFFICULT AT ALL
7. FEELING AFRAID AS IF SOMETHING AWFUL MIGHT HAPPEN: NOT AT ALL
1. FEELING NERVOUS, ANXIOUS, OR ON EDGE: NOT AT ALL

## 2019-02-14 ASSESSMENT — PATIENT HEALTH QUESTIONNAIRE - PHQ9
5. POOR APPETITE OR OVEREATING: NOT AT ALL
SUM OF ALL RESPONSES TO PHQ QUESTIONS 1-9: 0

## 2019-02-14 NOTE — PROGRESS NOTES
SUBJECTIVE:  Amina Aguayo,  is here for a postpartum visit.  She had a  on 19 delivering a healthy baby boy, named Chris weighing 7 lbs 6.9 oz at term.      HPI:  Doing well. States that her mood has been stable. NO concerns. Kulwant and Jordy are adjusting well. Interested in contraception as Chris was unplanned.    Last PHQ-9 score on record=   PHQ-9 SCORE 2019   PHQ-9 Total Score 0     JOHNNY-7 SCORE 2018   Total Score 11 0 0       Delivery complications:  No  Breast feeding:  Yes,   Bladder problems:  Yes,   Bowel problems/hemorrhoids:  hemorrhoids  Episiotomy/laceration/incision healed? 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  Eyes: blurry    OBJECTIVE:  Vitals: /60   Wt 77.1 kg (170 lb)   LMP 2018   Breastfeeding? Yes   BMI 26.63 kg/m    BMI= Body mass index is 26.63 kg/m .  General - pleasant female in no acute distress.  CV: RRR  RESP: CTAB  Breast -  symmetric, no skin changes and no puckering  Abdomen - soft, non-tender. Non-distended  Pelvic - EG: normal adult female, BUS: within normal limits, Vagina: well rugated, no discharge, Cervix: no lesions or CMT, Uterus: firm, normal sized and nontender, anteverted in position. Adnexae: no masses or tenderness.  Rectovaginal - deferred.    ASSESSMENT:    ICD-10-CM    1. Routine postpartum follow-up Z39.2 norethindrone (MICRONOR) 0.35 MG tablet       PLAN:  May resume normal activities without restrictions.  Pap smear was not done today.    Full counseling was provided, and all questions were answered. Discussed short acting and long acting forms of contraception. She is interested in OCPs at this time. Will send micronor.   Return to clinic in one year for an annual visit.     There are no Patient Instructions on file for this  visit.  Bita Marcelino MD

## 2019-02-15 ASSESSMENT — ANXIETY QUESTIONNAIRES: GAD7 TOTAL SCORE: 0

## 2019-05-15 ENCOUNTER — OFFICE VISIT (OUTPATIENT)
Dept: FAMILY MEDICINE | Facility: CLINIC | Age: 29
End: 2019-05-15
Payer: COMMERCIAL

## 2019-05-15 VITALS
SYSTOLIC BLOOD PRESSURE: 109 MMHG | HEART RATE: 82 BPM | OXYGEN SATURATION: 97 % | DIASTOLIC BLOOD PRESSURE: 68 MMHG | HEIGHT: 66 IN | BODY MASS INDEX: 27.23 KG/M2 | TEMPERATURE: 98.6 F | WEIGHT: 169.4 LBS

## 2019-05-15 DIAGNOSIS — M25.542 ARTHRALGIA OF BOTH HANDS: Primary | ICD-10-CM

## 2019-05-15 DIAGNOSIS — R53.83 FATIGUE, UNSPECIFIED TYPE: ICD-10-CM

## 2019-05-15 DIAGNOSIS — M25.541 ARTHRALGIA OF BOTH HANDS: Primary | ICD-10-CM

## 2019-05-15 LAB
CRP SERPL-MCNC: <2.9 MG/L (ref 0–8)
ERYTHROCYTE [DISTWIDTH] IN BLOOD BY AUTOMATED COUNT: 14 % (ref 10–15)
ERYTHROCYTE [SEDIMENTATION RATE] IN BLOOD BY WESTERGREN METHOD: 6 MM/H (ref 0–20)
HCT VFR BLD AUTO: 41.5 % (ref 35–47)
HGB BLD-MCNC: 13.8 G/DL (ref 11.7–15.7)
MCH RBC QN AUTO: 28.7 PG (ref 26.5–33)
MCHC RBC AUTO-ENTMCNC: 33.3 G/DL (ref 31.5–36.5)
MCV RBC AUTO: 86 FL (ref 78–100)
PLATELET # BLD AUTO: 225 10E9/L (ref 150–450)
RBC # BLD AUTO: 4.81 10E12/L (ref 3.8–5.2)
WBC # BLD AUTO: 5.8 10E9/L (ref 4–11)

## 2019-05-15 PROCEDURE — 82306 VITAMIN D 25 HYDROXY: CPT | Performed by: FAMILY MEDICINE

## 2019-05-15 PROCEDURE — 85027 COMPLETE CBC AUTOMATED: CPT | Performed by: FAMILY MEDICINE

## 2019-05-15 PROCEDURE — 84439 ASSAY OF FREE THYROXINE: CPT | Performed by: FAMILY MEDICINE

## 2019-05-15 PROCEDURE — 86200 CCP ANTIBODY: CPT | Performed by: FAMILY MEDICINE

## 2019-05-15 PROCEDURE — 99214 OFFICE O/P EST MOD 30 MIN: CPT | Performed by: FAMILY MEDICINE

## 2019-05-15 PROCEDURE — 84443 ASSAY THYROID STIM HORMONE: CPT | Performed by: FAMILY MEDICINE

## 2019-05-15 PROCEDURE — 36415 COLL VENOUS BLD VENIPUNCTURE: CPT | Performed by: FAMILY MEDICINE

## 2019-05-15 PROCEDURE — 86431 RHEUMATOID FACTOR QUANT: CPT | Performed by: FAMILY MEDICINE

## 2019-05-15 PROCEDURE — 85652 RBC SED RATE AUTOMATED: CPT | Performed by: FAMILY MEDICINE

## 2019-05-15 PROCEDURE — 86140 C-REACTIVE PROTEIN: CPT | Performed by: FAMILY MEDICINE

## 2019-05-15 ASSESSMENT — MIFFLIN-ST. JEOR: SCORE: 1515.14

## 2019-05-15 NOTE — NURSING NOTE
"Chief Complaint   Patient presents with     Postpartum Care     /68   Pulse 82   Temp 98.6  F (37  C) (Oral)   Ht 1.676 m (5' 6\")   Wt 76.8 kg (169 lb 6.4 oz)   LMP  (LMP Unknown)   SpO2 97%   BMI 27.34 kg/m   Estimated body mass index is 27.34 kg/m  as calculated from the following:    Height as of this encounter: 1.676 m (5' 6\").    Weight as of this encounter: 76.8 kg (169 lb 6.4 oz).  Medication Reconciliation: complete      Health Maintenance that is potentially due pending provider review:  Pap Smear    Pt will schedule phyical appt and have pap smear done at that time.    GAURAV Parr  "

## 2019-05-15 NOTE — PROGRESS NOTES
SUBJECTIVE:   Amina Aguayo is a 28 year old female who presents to clinic today for the following   health issues:        Postpartum Care       Duration: x3 months, sx have worsened in the past 3 weeks     Description (location/character/radiation): muscles and joints     Intensity:  moderate    Accompanying signs and symptoms: muscle and joint soreness/stiffness, fatigue, weight gain    History (similar episodes/previous evaluation): has had similar sx in past after giving birth to second child     Precipitating or alleviating factors: had baby boy 2019    Therapies tried and outcome: None   Pt would like to discuss having thyroid levels checked.    Similar episode after second pregnancy    Feel sore all over -   Feels like joints - neck, shoulders -   Feels like more upper body -    rashes - none  Weight - stable but is currently breastfeeding  Sleep - waking 1-2 times per week - 7 hours broken up  Family hx - none -       -------------------------------------    Additional history: as documented    Reviewed  and updated as needed this visit by clinical staff  Tobacco  Allergies  Meds  Problems  Med Hx  Surg Hx  Fam Hx         Reviewed and updated as needed this visit by Provider  Tobacco  Allergies  Meds  Problems  Med Hx  Surg Hx  Fam Hx         Patient Active Problem List   Diagnosis     Generalized anxiety disorder     History of colposcopy     High-risk pregnancy, third trimester     Subchorionic hemorrhage of placenta in first trimester, single or unspecified fetus     Previous  delivery, antepartum     Indication for care in labor and delivery, antepartum     NST (non-stress test) reactive     Maternal care for scar from previous  delivery, unspecified prior  delivery type     Non-reassuring electronic fetal monitoring tracing     , delivered     Past Surgical History:   Procedure Laterality Date     APPENDECTOMY        SECTION        "ORTHOPEDIC SURGERY      wrist surgey       Social History     Tobacco Use     Smoking status: Former Smoker     Last attempt to quit: 10/8/2013     Years since quittin.6     Smokeless tobacco: Never Used   Substance Use Topics     Alcohol use: No     Frequency: Never     Comment: few times a week-none while pregnant     Family History   Problem Relation Age of Onset     Depression Mother      Depression Maternal Grandmother      Dementia Maternal Grandmother      Depression Sister            ROS:  Constitutional, HEENT, cardiovascular, pulmonary, GI, , musculoskeletal, neuro, skin, endocrine and psych systems are negative, except as otherwise noted.    OBJECTIVE:     /68   Pulse 82   Temp 98.6  F (37  C) (Oral)   Ht 1.676 m (5' 6\")   Wt 76.8 kg (169 lb 6.4 oz)   LMP  (LMP Unknown)   SpO2 97%   BMI 27.34 kg/m    Body mass index is 27.34 kg/m .  GENERAL: healthy, alert and no distress  CV: regular rate and rhythm, normal S1 S2, no S3 or S4, no murmur, click or rub, no peripheral edema and peripheral pulses strong  MS: no gross musculoskeletal defects noted, no edema  SKIN: no suspicious lesions or rashes    Diagnostic Test Results:  Results for orders placed or performed in visit on 05/15/19 (from the past 24 hour(s))   ESR: Erythrocyte sedimentation rate   Result Value Ref Range    Sed Rate 6 0 - 20 mm/h   CBC with platelets   Result Value Ref Range    WBC 5.8 4.0 - 11.0 10e9/L    RBC Count 4.81 3.8 - 5.2 10e12/L    Hemoglobin 13.8 11.7 - 15.7 g/dL    Hematocrit 41.5 35.0 - 47.0 %    MCV 86 78 - 100 fl    MCH 28.7 26.5 - 33.0 pg    MCHC 33.3 31.5 - 36.5 g/dL    RDW 14.0 10.0 - 15.0 %    Platelet Count 225 150 - 450 10e9/L     Additional labs pending    ASSESSMENT/PLAN:     1. Arthralgia of both hands  Joint pain and fatigue postpartum - similar episode happened with second pregnancy and this seems similar.  Was not evaluate at the time.  Will check some labs too look for possible cause like " thyroid, autoimmune issues or other  If all normal monitor or RTC to discuss mood and if medication for PPD would be needed  - Vitamin D Deficiency  - TSH  - T4, free  - ESR: Erythrocyte sedimentation rate  - CRP, inflammation  - Rheumatoid factor  - Cyclic Citrullinated Peptide Antibody IgG  - CBC with platelets    2. Fatigue, unspecified type  As above  - Vitamin D Deficiency  - TSH  - T4, free  - ESR: Erythrocyte sedimentation rate  - CRP, inflammation  - Rheumatoid factor  - Cyclic Citrullinated Peptide Antibody IgG  - CBC with platelets    Pt will call or RTC if symptoms worsen or do not improve.      Ramila Reynolds,   [facility]

## 2019-05-16 LAB
CCP AB SER IA-ACNC: 1 U/ML
DEPRECATED CALCIDIOL+CALCIFEROL SERPL-MC: 7 UG/L (ref 20–75)
RHEUMATOID FACT SER NEPH-ACNC: <20 IU/ML (ref 0–20)
T4 FREE SERPL-MCNC: 0.92 NG/DL (ref 0.76–1.46)
TSH SERPL DL<=0.005 MIU/L-ACNC: 1.37 MU/L (ref 0.4–4)

## 2019-05-22 NOTE — RESULT ENCOUNTER NOTE
Dear Amina,   Your test results are all back -   -All of your labs are normal EXCEPT your vitamin D.  The vitamin D level is very low.  I recommend starting vitamin D3 at a dose of 5000 international unit(s) daily.  Plan to recheck in 4-6 weeks.  Let us know if you have any questions.  -Ramila Reynolds, DO

## 2019-08-02 ENCOUNTER — OFFICE VISIT (OUTPATIENT)
Dept: FAMILY MEDICINE | Facility: CLINIC | Age: 29
End: 2019-08-02
Payer: COMMERCIAL

## 2019-08-02 VITALS
DIASTOLIC BLOOD PRESSURE: 65 MMHG | HEART RATE: 71 BPM | BODY MASS INDEX: 27.34 KG/M2 | HEIGHT: 66 IN | OXYGEN SATURATION: 96 % | WEIGHT: 170.1 LBS | SYSTOLIC BLOOD PRESSURE: 100 MMHG | TEMPERATURE: 98.1 F

## 2019-08-02 DIAGNOSIS — J04.0 LARYNGITIS: Primary | ICD-10-CM

## 2019-08-02 DIAGNOSIS — R79.89 LOW VITAMIN D LEVEL: ICD-10-CM

## 2019-08-02 LAB
BASOPHILS # BLD AUTO: 0 10E9/L (ref 0–0.2)
BASOPHILS NFR BLD AUTO: 0.2 %
DEPRECATED CALCIDIOL+CALCIFEROL SERPL-MC: 42 UG/L (ref 20–75)
DIFFERENTIAL METHOD BLD: NORMAL
EOSINOPHIL # BLD AUTO: 0.1 10E9/L (ref 0–0.7)
EOSINOPHIL NFR BLD AUTO: 2.6 %
ERYTHROCYTE [DISTWIDTH] IN BLOOD BY AUTOMATED COUNT: 13.1 % (ref 10–15)
HCT VFR BLD AUTO: 42.1 % (ref 35–47)
HGB BLD-MCNC: 14 G/DL (ref 11.7–15.7)
LYMPHOCYTES # BLD AUTO: 1.6 10E9/L (ref 0.8–5.3)
LYMPHOCYTES NFR BLD AUTO: 28.9 %
MCH RBC QN AUTO: 29.4 PG (ref 26.5–33)
MCHC RBC AUTO-ENTMCNC: 33.3 G/DL (ref 31.5–36.5)
MCV RBC AUTO: 88 FL (ref 78–100)
MONOCYTES # BLD AUTO: 0.4 10E9/L (ref 0–1.3)
MONOCYTES NFR BLD AUTO: 7.2 %
NEUTROPHILS # BLD AUTO: 3.3 10E9/L (ref 1.6–8.3)
NEUTROPHILS NFR BLD AUTO: 61.1 %
PLATELET # BLD AUTO: 266 10E9/L (ref 150–450)
RBC # BLD AUTO: 4.77 10E12/L (ref 3.8–5.2)
WBC # BLD AUTO: 5.4 10E9/L (ref 4–11)

## 2019-08-02 PROCEDURE — 82306 VITAMIN D 25 HYDROXY: CPT | Performed by: PHYSICIAN ASSISTANT

## 2019-08-02 PROCEDURE — 85025 COMPLETE CBC W/AUTO DIFF WBC: CPT | Performed by: PHYSICIAN ASSISTANT

## 2019-08-02 PROCEDURE — 36415 COLL VENOUS BLD VENIPUNCTURE: CPT | Performed by: PHYSICIAN ASSISTANT

## 2019-08-02 PROCEDURE — 99214 OFFICE O/P EST MOD 30 MIN: CPT | Performed by: PHYSICIAN ASSISTANT

## 2019-08-02 ASSESSMENT — MIFFLIN-ST. JEOR: SCORE: 1518.32

## 2019-08-02 NOTE — PROGRESS NOTES
Patient called and had her Physical in Feb. 2019 with her OB/GYN so she can't have another one until 1 year and a day after the last one

## 2019-08-02 NOTE — PROGRESS NOTES
"Subjective     Amina Aguayo is a 28 year old female who presents to clinic today for the following health issues:    HPI   Intermittent laryngitis      Duration: ongoing since 05/19    Description (location/character/radiation): 1.5 weeks of laryngitis, then 2 week reprieve, then returns again    Intensity:  moderate    Accompanying signs and symptoms: as above, also has low vit d    History (similar episodes/previous evaluation): seen at , neg for strep-2+ weeks ago, and also 2+ weeks before that    Precipitating or alleviating factors: n/a    Therapies tried and outcome: None     She has been dealing with on and off laryngitis for the last several months.  Has been checked for step multiple times.  It can get full blown to where she loses her voice.  Tends to go away, but has been recurrent a few times.  She does have 3 kids, and cold do go around, very aware it may just be that.  Denies fevers, chills.  No previous history of allergies.    She was found to have very low vitamin D at her last visit, has started replacement.  Wonders if she is still low and could be playing a role in her getting sick more often.    BP Readings from Last 3 Encounters:   08/02/19 100/65   05/15/19 109/68   02/14/19 108/60    Wt Readings from Last 3 Encounters:   08/02/19 77.2 kg (170 lb 1.6 oz)   05/15/19 76.8 kg (169 lb 6.4 oz)   02/14/19 77.1 kg (170 lb)                      Reviewed and updated as needed this visit by Provider         Review of Systems   ROS COMP: Constitutional, HEENT, cardiovascular, pulmonary, gi and gu systems are negative, except as otherwise noted.      Objective    /65   Pulse 71   Temp 98.1  F (36.7  C) (Oral)   Ht 1.676 m (5' 6\")   Wt 77.2 kg (170 lb 1.6 oz)   SpO2 96%   BMI 27.45 kg/m    Body mass index is 27.45 kg/m .  Physical Exam   GENERAL: alert and no distress  EYES: Eyes grossly normal to inspection  HENT: normal cephalic/atraumatic, ear canals and TM's normal, nose and " "mouth without ulcers or lesions, oropharynx clear and little bit of post nasal drainage  NECK: no adenopathy, no asymmetry, masses, or scars and thyroid normal to palpation  RESP: lungs clear to auscultation - no rales, rhonchi or wheezes  CV: regular rate and rhythm, normal S1 S2, no S3 or S4, no murmur, click or rub, no peripheral edema and peripheral pulses strong  PSYCH: mentation appears normal, affect normal/bright    Diagnostic Test Results:  Labs reviewed in Epic        Assessment & Plan     1. Laryngitis  May just be viral illnesses with children being sick all the time.  Could also be allergies with PND.  Discussed OTC flonase vs oral claritin.  Will also screen CBC to make sure no abnormalities with WBC.  If everything comes back ok, and meds to not seem to improve, may look at ENT evaluation.    - CBC with platelets differential  - Vitamin D Deficiency    2. Low vitamin D level  Will recheck levels since starting replacement.  Low levels do play a role in immunity.    - Vitamin D Deficiency     BMI:   Estimated body mass index is 27.45 kg/m  as calculated from the following:    Height as of this encounter: 1.676 m (5' 6\").    Weight as of this encounter: 77.2 kg (170 lb 1.6 oz).               Return in about 3 months (around 11/2/2019).    Gavin Sanches PA-C  [facility]        "

## 2019-08-02 NOTE — NURSING NOTE
"Chief Complaint   Patient presents with     Throat Problem     intermittent laryngitis     /65   Pulse 71   Temp 98.1  F (36.7  C) (Oral)   Ht 1.676 m (5' 6\")   Wt 77.2 kg (170 lb 1.6 oz)   SpO2 96%   BMI 27.45 kg/m   Estimated body mass index is 27.45 kg/m  as calculated from the following:    Height as of this encounter: 1.676 m (5' 6\").    Weight as of this encounter: 77.2 kg (170 lb 1.6 oz).        Health Maintenance due pending provider review:  Pap Smear    Aware due, will schedule phx today    Lisandra Kelsey CMA  "

## 2019-08-05 NOTE — RESULT ENCOUNTER NOTE
Dear Amina    Your test results are attached, feel free to contact me via [employer]     Everything looks good on your labs, and your vitamin D level jumped almost to optimal.  Goal is around 50, so you should get there soon.    Zoltan Sanches PA-C

## 2019-10-01 ENCOUNTER — HEALTH MAINTENANCE LETTER (OUTPATIENT)
Age: 29
End: 2019-10-01

## 2019-11-05 ENCOUNTER — TELEPHONE (OUTPATIENT)
Dept: OBGYN | Facility: CLINIC | Age: 29
End: 2019-11-05

## 2019-11-05 DIAGNOSIS — B37.0 THRUSH: Primary | ICD-10-CM

## 2019-11-05 RX ORDER — NYSTATIN 100000 U/G
CREAM TOPICAL 2 TIMES DAILY
Qty: 30 G | Refills: 1 | Status: SHIPPED | OUTPATIENT
Start: 2019-11-05 | End: 2019-11-12

## 2019-11-05 NOTE — TELEPHONE ENCOUNTER
"10 month old son was at Pediatrician office today and has thrush.  Patient was told she had it too and should call OB to get a rx.    Fiery red, stinging and burning on both nipples. Lotrimin minimal relief, so Ped ordered apno cream but can't prescribe oral medications and feels she could benefit as it looks \"deeper\".  [facility] [address] [place]    Routing to provider to advise for oral treatment.  Esther Butler RN on 11/5/2019 at 3:18 PM    "

## 2019-11-06 RX ORDER — FLUCONAZOLE 150 MG/1
150 TABLET ORAL DAILY
Qty: 7 TABLET | Refills: 0 | Status: SHIPPED | OUTPATIENT
Start: 2019-11-06 | End: 2019-11-13

## 2019-11-06 NOTE — TELEPHONE ENCOUNTER
Patient notified. Pt verbalized understanding, in agreement with plan, and voiced no further questions.  Esther Butler RN on 11/6/2019 at 11:35 AM

## 2019-12-23 ENCOUNTER — TELEPHONE (OUTPATIENT)
Dept: OBGYN | Facility: CLINIC | Age: 29
End: 2019-12-23

## 2019-12-23 NOTE — TELEPHONE ENCOUNTER
Pt states son has thrush   Did a virtual visit was prescribed diflucan 200 mg tablets for 5 days-was effective last time she was treated. Does feel like her hands had some numbness/tingling when she started taking this round of Diflucan. No other symptoms of allergic reaction. Will call if this happens again. Using OTC lotrimin-does not feel this is helping nipples are extremely red/irritated/raw. Would like apno cream  Currently in [facility]    Mupirocin (Bactroban) 2% Ointment (10gms), Nystatin 100,000 units/ml ointment (10gms), Betamethasone 0.1% ointment (10gms), Clortrimazole 1% (10gms). Apply a small amount after each feeding or pumping. Do not wipe it off before the next feeding or pumping, it is safe for both you & baby. Use it everyday until you are healed or become pain free.    Rx called to [facility] in [facility] phone number [phone]-pt informed  Madelyn Hall RN on 12/23/2019 at 4:00 PM

## 2020-03-22 ENCOUNTER — HEALTH MAINTENANCE LETTER (OUTPATIENT)
Age: 30
End: 2020-03-22

## 2020-09-10 DIAGNOSIS — O36.80X0 PREGNANCY WITH INCONCLUSIVE FETAL VIABILITY: Primary | ICD-10-CM

## 2020-09-10 NOTE — PROGRESS NOTES
Pt has NOB scheduled with ultrasound. Needed order to be placed. Future ultrasound order placed and linked with appt. Closing encounter.   Tish Devine RN on 9/10/2020 at 2:51 PM

## 2020-09-14 ENCOUNTER — PRENATAL OFFICE VISIT (OUTPATIENT)
Dept: NURSING | Facility: CLINIC | Age: 30
End: 2020-09-14
Payer: COMMERCIAL

## 2020-09-14 DIAGNOSIS — O09.91 SUPERVISION OF HIGH RISK PREGNANCY IN FIRST TRIMESTER: ICD-10-CM

## 2020-09-14 DIAGNOSIS — Z13.79 GENETIC SCREENING: Primary | ICD-10-CM

## 2020-09-14 PROCEDURE — 99207 ZZC NO CHARGE NURSE ONLY: CPT

## 2020-09-14 RX ORDER — VITAMIN A ACETATE, .BETA.-CAROTENE, ASCORBIC ACID, CHOLECALCIFEROL, .ALPHA.-TOCOPHEROL ACETATE, DL-, THIAMINE MONONITRATE, RIBOFLAVIN, NIACINAMIDE, PYRIDOXINE HYDROCHLORIDE, FOLIC ACID, CYANOCOBALAMIN, CALCIUM CARBONATE, FERROUS FUMARATE, ZINC OXIDE, AND CUPRIC OXIDE 2000; 2000; 120; 400; 22; 1.84; 3; 20; 10; 1; 12; 200; 27; 25; 2 [IU]/1; [IU]/1; MG/1; [IU]/1; MG/1; MG/1; MG/1; MG/1; MG/1; MG/1; UG/1; MG/1; MG/1; MG/1; MG/1
1 TABLET ORAL DAILY
COMMUNITY
End: 2021-06-07

## 2020-09-14 NOTE — PROGRESS NOTES
SUBJECTIVE:     HPI:    This is a 29 year old female patient,  who presents for her first obstetrical visit.    SALONI: 2021, by Last Menstrual Period.  She is 8w3d weeks as of nurse visit on 20.  Her cycles are regular.  Her last menstrual period was normal.   Since her LMP, she has experienced  nausea and fatigue).   She denies emesis, abdominal pain, headache, loss of appetite, vaginal discharge, dysuria, pelvic pain, urinary urgency, lightheadedness, urinary frequency, vaginal bleeding, hemorrhoids and constipation.    Additional History:  2019, hx abnormal PAP.      Have you travelled during the pregnancy?No  Have your sexual partner(s) travelled during the pregnancy?No      HISTORY:   Planned Pregnancy: Yes  Marital Status:   Occupation: Homemeaker  Living in Household: Spouse and Children    Past History:  Her past medical history   Past Medical History:   Diagnosis Date     Anorexia      Bulimia      Depressive disorder     PP anxiety with last baby; was on Zoloft   .      She has a history of  prior  section    Since her last LMP she denies use of alcohol, tobacco and street drugs.    Past medical, surgical, social and family history were reviewed and updated in [employer].      Current Outpatient Medications   Medication     doxylamine (UNISOM) 25 MG TABS tablet     Prenatal Vit-Fe Fumarate-FA (PNV PRENATAL PLUS MULTIVITAMIN) 27-1 MG TABS per tablet     No current facility-administered medications for this visit.        ROS:   12 point review of systems negative other than symptoms noted below or in the HPI.  Constitutional: Fatigue  Gastrointestinal: Nausea    Nurse phone visit completed. Prenatal book and folder (containing standard educational hand-outs and brochures) will be given at the next visit to patient. Information in folder reviewed over the phone. Questions answered. Brochure given on optional screening available to assess chromosomal anomalies. Pt unsure of NIPT.  Pt advised to call the clinic if she has any questions or concerns related to her pregnancy. Prenatal labs future ordered. New prenatal visit scheduled on 9/28/20 with Dr. Marcelino.    Lab Results   Component Value Date    PAP NIL 05/11/2018   Recommended repeat PAP 1 year due to hx of abnormal  PHQ-9 score:    PHQ 9/10/2020   PHQ-9 Total Score 2   Q9: Thoughts of better off dead/self-harm past 2 weeks Not at all     JOHNNY-7 SCORE 5/11/2018 2/14/2019 9/10/2020   Total Score - - 0 (minimal anxiety)   Total Score 0 0 0     Esther Butler, RN on 9/14/2020 at 9:15 AM      [employer]

## 2020-09-28 ENCOUNTER — ANCILLARY PROCEDURE (OUTPATIENT)
Dept: ULTRASOUND IMAGING | Facility: CLINIC | Age: 30
End: 2020-09-28
Payer: COMMERCIAL

## 2020-09-28 ENCOUNTER — PRENATAL OFFICE VISIT (OUTPATIENT)
Dept: OBGYN | Facility: CLINIC | Age: 30
End: 2020-09-28
Payer: COMMERCIAL

## 2020-09-28 ENCOUNTER — TELEPHONE (OUTPATIENT)
Dept: OBGYN | Facility: CLINIC | Age: 30
End: 2020-09-28

## 2020-09-28 VITALS
WEIGHT: 171 LBS | BODY MASS INDEX: 26.84 KG/M2 | SYSTOLIC BLOOD PRESSURE: 108 MMHG | HEART RATE: 64 BPM | HEIGHT: 67 IN | DIASTOLIC BLOOD PRESSURE: 62 MMHG

## 2020-09-28 DIAGNOSIS — O34.219 PREVIOUS CESAREAN DELIVERY, ANTEPARTUM: ICD-10-CM

## 2020-09-28 DIAGNOSIS — O09.91 SUPERVISION OF HIGH RISK PREGNANCY IN FIRST TRIMESTER: Primary | ICD-10-CM

## 2020-09-28 DIAGNOSIS — Z23 NEED FOR PROPHYLACTIC VACCINATION AND INOCULATION AGAINST INFLUENZA: ICD-10-CM

## 2020-09-28 DIAGNOSIS — O36.80X0 PREGNANCY WITH INCONCLUSIVE FETAL VIABILITY: ICD-10-CM

## 2020-09-28 PROCEDURE — 76817 TRANSVAGINAL US OBSTETRIC: CPT | Performed by: OBSTETRICS & GYNECOLOGY

## 2020-09-28 PROCEDURE — 87491 CHLMYD TRACH DNA AMP PROBE: CPT | Performed by: OBSTETRICS & GYNECOLOGY

## 2020-09-28 PROCEDURE — 90471 IMMUNIZATION ADMIN: CPT | Performed by: OBSTETRICS & GYNECOLOGY

## 2020-09-28 PROCEDURE — 90686 IIV4 VACC NO PRSV 0.5 ML IM: CPT | Performed by: OBSTETRICS & GYNECOLOGY

## 2020-09-28 PROCEDURE — 87591 N.GONORRHOEAE DNA AMP PROB: CPT | Performed by: OBSTETRICS & GYNECOLOGY

## 2020-09-28 PROCEDURE — 99207 ZZC FIRST OB VISIT: CPT | Performed by: OBSTETRICS & GYNECOLOGY

## 2020-09-28 ASSESSMENT — ANXIETY QUESTIONNAIRES
GAD7 TOTAL SCORE: 0
5. BEING SO RESTLESS THAT IT IS HARD TO SIT STILL: NOT AT ALL
6. BECOMING EASILY ANNOYED OR IRRITABLE: NOT AT ALL
7. FEELING AFRAID AS IF SOMETHING AWFUL MIGHT HAPPEN: NOT AT ALL
1. FEELING NERVOUS, ANXIOUS, OR ON EDGE: NOT AT ALL
3. WORRYING TOO MUCH ABOUT DIFFERENT THINGS: NOT AT ALL
2. NOT BEING ABLE TO STOP OR CONTROL WORRYING: NOT AT ALL

## 2020-09-28 ASSESSMENT — MIFFLIN-ST. JEOR: SCORE: 1525.89

## 2020-09-28 ASSESSMENT — PATIENT HEALTH QUESTIONNAIRE - PHQ9
SUM OF ALL RESPONSES TO PHQ QUESTIONS 1-9: 1
5. POOR APPETITE OR OVEREATING: NOT AT ALL

## 2020-09-28 NOTE — PROGRESS NOTES
HPI:     This is a 29 year old female patient,  who presents for her first obstetrical visit.     SALONI: 2021, by Last Menstrual Period.  She is 10w3d weeks as of nurse visit on 20.  Her cycles are regular.  Her last menstrual period was normal.   Since her LMP, she has experienced  nausea and fatigue).   She denies emesis, abdominal pain, headache, loss of appetite, vaginal discharge, dysuria, pelvic pain, urinary urgency, lightheadedness, urinary frequency, vaginal bleeding, hemorrhoids and constipation.     Additional History:  , hx abnormal PAP in  with normal paps since then. Her last pap was NILM in May of 2018. She is managing her nausea with unisom at night time. All of her sons are doing well. She is home schooling Kulwant( her oldest), Jordy and Chris are doing well as well. She is interested in another TOLAC. She was successfully induced during her last pregnancy.      Have you travelled during the pregnancy?No  Have your sexual partner(s) travelled during the pregnancy?No        HISTORY:   Planned Pregnancy: Yes  Marital Status:   Occupation: Homemeaker  Living in Household: Spouse and Children     Past History:  Her past medical history   Past Medical History        Past Medical History:   Diagnosis Date     Anorexia       Bulimia       Depressive disorder       PP anxiety with last baby; was on Zoloft      .       She has a history of  prior  section     Since her last LMP she denies use of alcohol, tobacco and street drugs.     Past medical, surgical, social and family history were reviewed and updated in [employer].            Current Outpatient Medications   Medication     doxylamine (UNISOM) 25 MG TABS tablet     Prenatal Vit-Fe Fumarate-FA (PNV PRENATAL PLUS MULTIVITAMIN) 27-1 MG TABS per tablet      No current facility-administered medications for this visit.          ROS:   12 point review of systems negative other than symptoms noted below or in the  "HPI.  Constitutional: Fatigue  Gastrointestinal: Nausea              Lab Results   Component Value Date     PAP NIL 2018   Recommended repeat PAP 1 year due to hx of abnormal  PHQ-9 score:    PHQ 9/10/2020   PHQ-9 Total Score 2   Q9: Thoughts of better off dead/self-harm past 2 weeks Not at all      JOHNNY-7 SCORE 2018 2019 9/10/2020   Total Score - - 0 (minimal anxiety)   Total Score 0 0 0        OBJECTIVE:     EXAM:  /62   Pulse 64   Ht 1.69 m (5' 6.54\")   Wt 77.6 kg (171 lb)   LMP 2020   BMI 27.16 kg/m   Body mass index is 27.16 kg/m .    GENERAL: healthy, alert and no distress  EYES: Eyes grossly normal to inspection, PERRL and conjunctivae and sclerae normal  NECK: no adenopathy, no asymmetry, masses, or scars and thyroid normal to palpation  RESP: lungs clear to auscultation - no rales, rhonchi or wheezes  BREAST: normal without masses, tenderness or nipple discharge and no palpable axillary masses or adenopathy  CV: regular rate and rhythm, normal S1 S2, no S3 or S4, no murmur, click or rub, no peripheral edema and peripheral pulses strong  ABDOMEN: soft, nontender, no hepatosplenomegaly, no masses and bowel sounds normal  MS: no gross musculoskeletal defects noted, no edema  SKIN: no suspicious lesions or rashes  NEURO: Normal strength and tone, mentation intact and speech normal  PSYCH: mentation appears normal, affect normal/bright    ASSESSMENT/PLAN:       ICD-10-CM    1. Supervision of high risk pregnancy in first trimester  O09.91 Neisseria gonorrhoeae PCR     Chlamydia trachomatis PCR   2. Need for prophylactic vaccination and inoculation against influenza  Z23 INFLUENZA VACCINE IM > 6 MONTHS VALENT IIV4 [43196]     Vaccine Administration, Initial [07555]   3. Previous  delivery, antepartum  O34.219        29 year old , On 2020 patient is 10w3d weeks of pregnancy with SALONI of 2021, by Last Menstrual Period    Discussed as follows:declines " aneuploidy screening in this pregnancy. Discussed the benefits/risks of a TOLAC.Given her prior successful  and , another TOLAC is a good option for her. The risks of uterine rupture with spontaneous labor at 0.9% and the risk of rupture with augmentation at 1.4% were discussed. Closer follow up due to RORO was advised. The flu vaccine was given today.     Counseling given:   - Follow up in 2 weeks for return OB visit.  - Recommended weight gain for pregnancy: 25-35 lbs.     Bita Marcelino MD

## 2020-09-29 LAB
C TRACH DNA SPEC QL NAA+PROBE: NEGATIVE
N GONORRHOEA DNA SPEC QL NAA+PROBE: NEGATIVE
SPECIMEN SOURCE: NORMAL
SPECIMEN SOURCE: NORMAL

## 2020-09-29 ASSESSMENT — ANXIETY QUESTIONNAIRES: GAD7 TOTAL SCORE: 0

## 2020-10-15 ENCOUNTER — PRENATAL OFFICE VISIT (OUTPATIENT)
Dept: OBGYN | Facility: CLINIC | Age: 30
End: 2020-10-15
Payer: COMMERCIAL

## 2020-10-15 VITALS — SYSTOLIC BLOOD PRESSURE: 92 MMHG | WEIGHT: 167 LBS | DIASTOLIC BLOOD PRESSURE: 60 MMHG | BODY MASS INDEX: 26.52 KG/M2

## 2020-10-15 DIAGNOSIS — O09.91 SUPERVISION OF HIGH RISK PREGNANCY IN FIRST TRIMESTER: ICD-10-CM

## 2020-10-15 LAB
ABO + RH BLD: NORMAL
ABO + RH BLD: NORMAL
ALBUMIN UR-MCNC: NEGATIVE MG/DL
APPEARANCE UR: CLEAR
BACTERIA #/AREA URNS HPF: ABNORMAL /HPF
BILIRUB UR QL STRIP: NEGATIVE
BLD GP AB SCN SERPL QL: NORMAL
BLOOD BANK CMNT PATIENT-IMP: NORMAL
COLOR UR AUTO: YELLOW
ERYTHROCYTE [DISTWIDTH] IN BLOOD BY AUTOMATED COUNT: 12.2 % (ref 10–15)
GLUCOSE UR STRIP-MCNC: NEGATIVE MG/DL
HCT VFR BLD AUTO: 40.5 % (ref 35–47)
HGB BLD-MCNC: 14.2 G/DL (ref 11.7–15.7)
HGB UR QL STRIP: ABNORMAL
KETONES UR STRIP-MCNC: ABNORMAL MG/DL
LEUKOCYTE ESTERASE UR QL STRIP: ABNORMAL
MCH RBC QN AUTO: 30 PG (ref 26.5–33)
MCHC RBC AUTO-ENTMCNC: 35.1 G/DL (ref 31.5–36.5)
MCV RBC AUTO: 86 FL (ref 78–100)
NITRATE UR QL: NEGATIVE
NON-SQ EPI CELLS #/AREA URNS LPF: ABNORMAL /LPF
PH UR STRIP: 6 PH (ref 5–7)
PLATELET # BLD AUTO: 275 10E9/L (ref 150–450)
RBC # BLD AUTO: 4.73 10E12/L (ref 3.8–5.2)
RBC #/AREA URNS AUTO: ABNORMAL /HPF
SOURCE: ABNORMAL
SP GR UR STRIP: 1.01 (ref 1–1.03)
SPECIMEN EXP DATE BLD: NORMAL
UROBILINOGEN UR STRIP-ACNC: 0.2 EU/DL (ref 0.2–1)
WBC # BLD AUTO: 6.4 10E9/L (ref 4–11)
WBC #/AREA URNS AUTO: ABNORMAL /HPF

## 2020-10-15 PROCEDURE — 81001 URINALYSIS AUTO W/SCOPE: CPT | Performed by: OBSTETRICS & GYNECOLOGY

## 2020-10-15 PROCEDURE — 86901 BLOOD TYPING SEROLOGIC RH(D): CPT | Performed by: OBSTETRICS & GYNECOLOGY

## 2020-10-15 PROCEDURE — 99000 SPECIMEN HANDLING OFFICE-LAB: CPT | Performed by: OBSTETRICS & GYNECOLOGY

## 2020-10-15 PROCEDURE — 36415 COLL VENOUS BLD VENIPUNCTURE: CPT | Performed by: OBSTETRICS & GYNECOLOGY

## 2020-10-15 PROCEDURE — 85027 COMPLETE CBC AUTOMATED: CPT | Performed by: OBSTETRICS & GYNECOLOGY

## 2020-10-15 PROCEDURE — 86762 RUBELLA ANTIBODY: CPT | Performed by: OBSTETRICS & GYNECOLOGY

## 2020-10-15 PROCEDURE — 86780 TREPONEMA PALLIDUM: CPT | Performed by: OBSTETRICS & GYNECOLOGY

## 2020-10-15 PROCEDURE — 86850 RBC ANTIBODY SCREEN: CPT | Performed by: OBSTETRICS & GYNECOLOGY

## 2020-10-15 PROCEDURE — 86900 BLOOD TYPING SEROLOGIC ABO: CPT | Performed by: OBSTETRICS & GYNECOLOGY

## 2020-10-15 PROCEDURE — 87340 HEPATITIS B SURFACE AG IA: CPT | Performed by: OBSTETRICS & GYNECOLOGY

## 2020-10-15 PROCEDURE — 99207 PR PRENATAL VISIT: CPT | Performed by: OBSTETRICS & GYNECOLOGY

## 2020-10-15 PROCEDURE — 87086 URINE CULTURE/COLONY COUNT: CPT | Performed by: OBSTETRICS & GYNECOLOGY

## 2020-10-15 PROCEDURE — 87389 HIV-1 AG W/HIV-1&-2 AB AG IA: CPT | Performed by: OBSTETRICS & GYNECOLOGY

## 2020-10-15 NOTE — PROGRESS NOTES
Prenatal Visit: short interval visit done for RORO. Normal bedside sonogram. Prenatal labs released today as they were not done at the last visit.  RTC in 4 weeks  Bita Marcelino MD

## 2020-10-16 LAB
BACTERIA SPEC CULT: NORMAL
HBV SURFACE AG SERPL QL IA: NONREACTIVE
HIV 1+2 AB+HIV1 P24 AG SERPL QL IA: NONREACTIVE
RUBV IGG SERPL IA-ACNC: 10 IU/ML
SPECIMEN SOURCE: NORMAL
T PALLIDUM AB SER QL: NONREACTIVE

## 2020-11-12 ENCOUNTER — PRENATAL OFFICE VISIT (OUTPATIENT)
Dept: OBGYN | Facility: CLINIC | Age: 30
End: 2020-11-12
Payer: COMMERCIAL

## 2020-11-12 VITALS — WEIGHT: 158 LBS | BODY MASS INDEX: 25.09 KG/M2 | SYSTOLIC BLOOD PRESSURE: 100 MMHG | DIASTOLIC BLOOD PRESSURE: 60 MMHG

## 2020-11-12 DIAGNOSIS — O09.92 SUPERVISION OF HIGH RISK PREGNANCY IN SECOND TRIMESTER: Primary | ICD-10-CM

## 2020-11-12 DIAGNOSIS — Z36.1 ENCOUNTER FOR ANTENATAL SCREENING FOR HIGH ALPHA-FETOPROTEIN LEVEL: ICD-10-CM

## 2020-11-12 PROCEDURE — 99000 SPECIMEN HANDLING OFFICE-LAB: CPT | Performed by: OBSTETRICS & GYNECOLOGY

## 2020-11-12 PROCEDURE — 82105 ALPHA-FETOPROTEIN SERUM: CPT | Mod: 90 | Performed by: OBSTETRICS & GYNECOLOGY

## 2020-11-12 PROCEDURE — 99207 PR PRENATAL VISIT: CPT | Performed by: OBSTETRICS & GYNECOLOGY

## 2020-11-12 PROCEDURE — 36415 COLL VENOUS BLD VENIPUNCTURE: CPT | Performed by: OBSTETRICS & GYNECOLOGY

## 2020-11-12 NOTE — PROGRESS NOTES
Prenatal Visit: doing well no other concerns today. AFP is accepted. Anatomical survey in 3 weeks  Bita Marcelino MD

## 2020-11-13 LAB
# FETUSES US: NORMAL
# FETUSES: NORMAL
AFP ADJ MOM AMN: 0.7
AFP SERPL-MCNC: 25 NG/ML
AGE - REPORTED: 30.3 YR
CURRENT SMOKER: NO
CURRENT SMOKER: NO
DIABETES STATUS PATIENT: NO
FAMILY MEMBER DISEASES HX: NO
FAMILY MEMBER DISEASES HX: NO
GA METHOD: NORMAL
GA METHOD: NORMAL
GA: NORMAL WK
IDDM PATIENT QL: NO
INTEGRATED SCN PATIENT-IMP: NORMAL
LMP START DATE: NORMAL
MONOCHORIONIC TWINS: NO
SERVICE CMNT-IMP: NO
SPECIMEN DRAWN SERPL: NORMAL
VALPROIC/CARBAMAZEPINE STATUS: NO
WEIGHT UNITS: NORMAL

## 2020-12-03 ENCOUNTER — ANCILLARY PROCEDURE (OUTPATIENT)
Dept: ULTRASOUND IMAGING | Facility: CLINIC | Age: 30
End: 2020-12-03
Attending: OBSTETRICS & GYNECOLOGY
Payer: COMMERCIAL

## 2020-12-03 ENCOUNTER — PRENATAL OFFICE VISIT (OUTPATIENT)
Dept: OBGYN | Facility: CLINIC | Age: 30
End: 2020-12-03
Attending: OBSTETRICS & GYNECOLOGY
Payer: COMMERCIAL

## 2020-12-03 VITALS — DIASTOLIC BLOOD PRESSURE: 58 MMHG | BODY MASS INDEX: 24.62 KG/M2 | SYSTOLIC BLOOD PRESSURE: 92 MMHG | WEIGHT: 155 LBS

## 2020-12-03 DIAGNOSIS — O09.92 SUPERVISION OF HIGH RISK PREGNANCY IN SECOND TRIMESTER: ICD-10-CM

## 2020-12-03 PROCEDURE — 76805 OB US >/= 14 WKS SNGL FETUS: CPT | Performed by: OBSTETRICS & GYNECOLOGY

## 2020-12-03 PROCEDURE — 99207 PR PRENATAL VISIT: CPT | Performed by: OBSTETRICS & GYNECOLOGY

## 2020-12-03 NOTE — PROGRESS NOTES
Prenatal Visit: nausea has significantly improved although Amina is not gaining weight currently. Her fetal growth is normal however. No subchorionic bleed seen. Normal anatomical survey today. Normal AFP at her prior visit.  RTC in 4 weeks  Bita Marcelino MD

## 2020-12-31 ENCOUNTER — PRENATAL OFFICE VISIT (OUTPATIENT)
Dept: OBGYN | Facility: CLINIC | Age: 30
End: 2020-12-31
Payer: COMMERCIAL

## 2020-12-31 VITALS — WEIGHT: 154.8 LBS | BODY MASS INDEX: 24.59 KG/M2 | DIASTOLIC BLOOD PRESSURE: 58 MMHG | SYSTOLIC BLOOD PRESSURE: 94 MMHG

## 2020-12-31 DIAGNOSIS — O09.92 SUPERVISION OF HIGH RISK PREGNANCY IN SECOND TRIMESTER: ICD-10-CM

## 2020-12-31 PROCEDURE — 99207 PR PRENATAL VISIT: CPT | Performed by: OBSTETRICS & GYNECOLOGY

## 2020-12-31 NOTE — PROGRESS NOTES
Prenatal Visit: Doing better in terms of the nausea. No longer losing weight. Discussed the glucola at the next visit.  RTC in 4 weeks  Bita Marcelino MD

## 2021-01-05 ENCOUNTER — TELEPHONE (OUTPATIENT)
Dept: OBGYN | Facility: CLINIC | Age: 31
End: 2021-01-05

## 2021-01-05 NOTE — TELEPHONE ENCOUNTER
Pt would like to know the gender of her baby via a piece of paper at the . Routing to US department to find out gender and then can contact pt  Madelyn Hall RN on 1/5/2021 at 3:26 PM

## 2021-01-15 ENCOUNTER — HEALTH MAINTENANCE LETTER (OUTPATIENT)
Age: 31
End: 2021-01-15

## 2021-01-19 DIAGNOSIS — Z36.9 ENCOUNTER FOR ANTENATAL SCREENING OF MOTHER: Primary | ICD-10-CM

## 2021-01-19 DIAGNOSIS — Z23 NEED FOR TDAP VACCINATION: ICD-10-CM

## 2021-01-28 ENCOUNTER — PRENATAL OFFICE VISIT (OUTPATIENT)
Dept: OBGYN | Facility: CLINIC | Age: 31
End: 2021-01-28
Payer: COMMERCIAL

## 2021-01-28 VITALS — DIASTOLIC BLOOD PRESSURE: 48 MMHG | WEIGHT: 153.6 LBS | BODY MASS INDEX: 24.39 KG/M2 | SYSTOLIC BLOOD PRESSURE: 90 MMHG

## 2021-01-28 DIAGNOSIS — O09.92 SUPERVISION OF HIGH RISK PREGNANCY IN SECOND TRIMESTER: ICD-10-CM

## 2021-01-28 DIAGNOSIS — Z36.9 ENCOUNTER FOR ANTENATAL SCREENING OF MOTHER: Primary | ICD-10-CM

## 2021-01-28 DIAGNOSIS — Z36.9 ENCOUNTER FOR ANTENATAL SCREENING OF MOTHER: ICD-10-CM

## 2021-01-28 DIAGNOSIS — O09.93 SUPERVISION OF HIGH RISK PREGNANCY IN THIRD TRIMESTER: ICD-10-CM

## 2021-01-28 DIAGNOSIS — Z23 NEED FOR TDAP VACCINATION: ICD-10-CM

## 2021-01-28 PROBLEM — O09.90 PREGNANCY, SUPERVISION, HIGH-RISK: Status: ACTIVE | Noted: 2020-09-14

## 2021-01-28 LAB
ERYTHROCYTE [DISTWIDTH] IN BLOOD BY AUTOMATED COUNT: 13.1 % (ref 10–15)
GLUCOSE 1H P 50 G GLC PO SERPL-MCNC: 90 MG/DL (ref 60–129)
HCT VFR BLD AUTO: 35.1 % (ref 35–47)
HGB BLD-MCNC: 11.7 G/DL (ref 11.7–15.7)
MCH RBC QN AUTO: 30.4 PG (ref 26.5–33)
MCHC RBC AUTO-ENTMCNC: 33.3 G/DL (ref 31.5–36.5)
MCV RBC AUTO: 91 FL (ref 78–100)
PLATELET # BLD AUTO: 219 10E9/L (ref 150–450)
RBC # BLD AUTO: 3.85 10E12/L (ref 3.8–5.2)
WBC # BLD AUTO: 7.8 10E9/L (ref 4–11)

## 2021-01-28 PROCEDURE — 99207 PR PRENATAL VISIT: CPT | Performed by: OBSTETRICS & GYNECOLOGY

## 2021-01-28 PROCEDURE — 36415 COLL VENOUS BLD VENIPUNCTURE: CPT | Performed by: OBSTETRICS & GYNECOLOGY

## 2021-01-28 PROCEDURE — 82950 GLUCOSE TEST: CPT | Performed by: OBSTETRICS & GYNECOLOGY

## 2021-01-28 PROCEDURE — 85027 COMPLETE CBC AUTOMATED: CPT | Performed by: OBSTETRICS & GYNECOLOGY

## 2021-01-28 PROCEDURE — 86780 TREPONEMA PALLIDUM: CPT | Performed by: OBSTETRICS & GYNECOLOGY

## 2021-01-28 PROCEDURE — 99000 SPECIMEN HANDLING OFFICE-LAB: CPT | Performed by: OBSTETRICS & GYNECOLOGY

## 2021-01-28 PROCEDURE — 90715 TDAP VACCINE 7 YRS/> IM: CPT

## 2021-01-28 PROCEDURE — 90471 IMMUNIZATION ADMIN: CPT

## 2021-01-28 NOTE — PROGRESS NOTES
Prenatal Visit: doing well. Found out she is having a girl!! Had an elective sonogram that showed normal growth and normal fluid but her fetus was breech. No weight gain so far in this pregnancy. Will check a growth sonogram if this trend continues. Normal fundal height today.  RTC in 2 weeks  Bita Marcelino MD

## 2021-01-28 NOTE — PROGRESS NOTES
Prior to immunization administration, verified patients identity using patient s name and date of birth. Please see Immunization Activity for additional information.     Screening Questionnaire for Adult Immunization    Are you sick today?   No   Do you have allergies to medications, food, a vaccine component or latex?   No   Have you ever had a serious reaction after receiving a vaccination?   No   Do you have a long-term health problem with heart, lung, kidney, or metabolic disease (e.g., diabetes), asthma, a blood disorder, no spleen, complement component deficiency, a cochlear implant, or a spinal fluid leak?  Are you on long-term aspirin therapy?   No   Do you have cancer, leukemia, HIV/AIDS, or any other immune system problem?   No   Do you have a parent, brother, or sister with an immune system problem?   No   In the past 3 months, have you taken medications that affect  your immune system, such as prednisone, other steroids, or anticancer drugs; drugs for the treatment of rheumatoid arthritis, Crohn s disease, or psoriasis; or have you had radiation treatments?   No   Have you had a seizure, or a brain or other nervous system problem?   No   During the past year, have you received a transfusion of blood or blood    products, or been given immune (gamma) globulin or antiviral drug?   No   For women: Are you pregnant or is there a chance you could become       pregnant during the next month?   yes   Have you received any vaccinations in the past 4 weeks?   No     Immunization questionnaire answers were all negative.        Per orders of Dr. Marcelino, injection of Tdap given by Lexi Randall CMA. Patient instructed to remain in clinic for 15 minutes afterwards, and to report any adverse reaction to me immediately.       Screening performed by Lexi Randall CMA on 1/28/2021 at 10:47 AM.

## 2021-01-28 NOTE — PROGRESS NOTES
Syphilis is a sexually transmitted disease that can cause birth defects in the babies of untreated mothers. Every pregnant patient is tested for syphilis early in each pregnancy as part of the routine lab work. The [place] [facility] has seen an increase in the rate of syphilis in [place]. The [facility] now recommends testing for syphilis 3 times during a pregnancy, the new prenatal visit, 28 weeks and when admitted for delivery. Patient accepts lab testing for syphilis.

## 2021-01-29 LAB — T PALLIDUM AB SER QL: NONREACTIVE

## 2021-02-11 ENCOUNTER — PRENATAL OFFICE VISIT (OUTPATIENT)
Dept: OBGYN | Facility: CLINIC | Age: 31
End: 2021-02-11
Payer: COMMERCIAL

## 2021-02-11 VITALS — WEIGHT: 155 LBS | SYSTOLIC BLOOD PRESSURE: 106 MMHG | BODY MASS INDEX: 24.62 KG/M2 | DIASTOLIC BLOOD PRESSURE: 74 MMHG

## 2021-02-11 DIAGNOSIS — O09.93 SUPERVISION OF HIGH RISK PREGNANCY IN THIRD TRIMESTER: Primary | ICD-10-CM

## 2021-02-11 PROCEDURE — 99207 PR PRENATAL VISIT: CPT | Performed by: OBSTETRICS & GYNECOLOGY

## 2021-02-11 NOTE — PROGRESS NOTES
Doing well. Concerned about fetal growth. Will get growth sonogram.   RTC in 2 weeks  Bita Marcelino MD

## 2021-03-01 ENCOUNTER — ANCILLARY PROCEDURE (OUTPATIENT)
Dept: ULTRASOUND IMAGING | Facility: CLINIC | Age: 31
End: 2021-03-01
Payer: COMMERCIAL

## 2021-03-01 ENCOUNTER — PRENATAL OFFICE VISIT (OUTPATIENT)
Dept: OBGYN | Facility: CLINIC | Age: 31
End: 2021-03-01
Payer: COMMERCIAL

## 2021-03-01 VITALS — SYSTOLIC BLOOD PRESSURE: 100 MMHG | BODY MASS INDEX: 24.11 KG/M2 | DIASTOLIC BLOOD PRESSURE: 60 MMHG | WEIGHT: 151.8 LBS

## 2021-03-01 DIAGNOSIS — O09.93 SUPERVISION OF HIGH RISK PREGNANCY IN THIRD TRIMESTER: Primary | ICD-10-CM

## 2021-03-01 DIAGNOSIS — O09.93 SUPERVISION OF HIGH RISK PREGNANCY IN THIRD TRIMESTER: ICD-10-CM

## 2021-03-01 PROCEDURE — 99207 PR PRENATAL VISIT: CPT | Performed by: OBSTETRICS & GYNECOLOGY

## 2021-03-01 PROCEDURE — 76816 OB US FOLLOW-UP PER FETUS: CPT | Performed by: OBSTETRICS & GYNECOLOGY

## 2021-03-01 NOTE — PROGRESS NOTES
Prenatal Visit: normal interval fetal growth despite no maternal weight gain. Discussed life stresses currently. Interested in planning for IOL at 39 weeks. Will schedule at 38 weeks  RTC in 2 weeks  Bita Marcelino MD

## 2021-03-15 ENCOUNTER — TELEPHONE (OUTPATIENT)
Dept: OBGYN | Facility: CLINIC | Age: 31
End: 2021-03-15

## 2021-03-15 NOTE — TELEPHONE ENCOUNTER
Fell down the stairs at her house   Didn't hit abd but slid down on her butt (kind of bounced down)  Positive blood type  No cramping or bleeding-just happened  Active baby  Consulted with Dr. Marcelino-has appt tomorrow  Ok to monitor for now if has any pain, cramping, bleeding, DFM then needs eval in [facility]  Pt informed of recommendations.   Madelyn Hall RN on 3/15/2021 at 2:11 PM

## 2021-03-16 ENCOUNTER — PRENATAL OFFICE VISIT (OUTPATIENT)
Dept: OBGYN | Facility: CLINIC | Age: 31
End: 2021-03-16
Payer: COMMERCIAL

## 2021-03-16 VITALS — BODY MASS INDEX: 24.68 KG/M2 | DIASTOLIC BLOOD PRESSURE: 48 MMHG | SYSTOLIC BLOOD PRESSURE: 96 MMHG | WEIGHT: 155.4 LBS

## 2021-03-16 DIAGNOSIS — O09.93 SUPERVISION OF HIGH RISK PREGNANCY IN THIRD TRIMESTER: ICD-10-CM

## 2021-03-16 PROCEDURE — 99207 PR PRENATAL VISIT: CPT | Performed by: OBSTETRICS & GYNECOLOGY

## 2021-03-16 NOTE — PROGRESS NOTES
Fetus palpates breech today. Will confirm with the bedside sonogram at her next visit. MSK soreness from the fall on the stairs last night but no abdominal pain or cramps.   RTC in 2 weeks for GBS. Bedside sonogram to check the fetal position.  Bita Marcelino MD

## 2021-03-16 NOTE — PROGRESS NOTES
Patient is returning call, please see message below.     Callback Number: [phone]  Best Availability: anytime  Can A Detailed Message Be Left? yes  Did you confirm the message with the caller?: yes     Pt called and said she was there this morning and they said they didn't receive anything. They also said that she needs a chest xray. Please refax to [phone].    Thank you,  Shamika Ruvalcaba       Prenatal Visit: doing well.  Good fetal movement. Feeling more pelvic pressure but not more contractions per se. Would still like TOLAC. Fetal heart rate was initially in 118 range then up to 130. Will obtain an NST in clinic for reassurance. If reactive RTC in 1 week.    Bita Marcelino MD

## 2021-03-25 ENCOUNTER — PRENATAL OFFICE VISIT (OUTPATIENT)
Dept: OBGYN | Facility: CLINIC | Age: 31
End: 2021-03-25
Payer: COMMERCIAL

## 2021-03-25 VITALS — WEIGHT: 155 LBS | BODY MASS INDEX: 24.62 KG/M2 | DIASTOLIC BLOOD PRESSURE: 54 MMHG | SYSTOLIC BLOOD PRESSURE: 100 MMHG

## 2021-03-25 DIAGNOSIS — O09.93 SUPERVISION OF HIGH RISK PREGNANCY IN THIRD TRIMESTER: ICD-10-CM

## 2021-03-25 DIAGNOSIS — Z36.85 SCREENING, ANTENATAL, FOR STREPTOCOCCUS B: Primary | ICD-10-CM

## 2021-03-25 PROCEDURE — 99207 PR PRENATAL VISIT: CPT | Performed by: OBSTETRICS & GYNECOLOGY

## 2021-03-25 PROCEDURE — 87653 STREP B DNA AMP PROBE: CPT | Performed by: OBSTETRICS & GYNECOLOGY

## 2021-03-26 NOTE — PROGRESS NOTES
Doing well. Good fetal movement. Vertex on bedside sonogram. Will continue to check at each visit. GBS collected today. Planning to drive to [place]. Discussed high risk for labor, knows where hospitals are.  RTC in one week.  Bita Marcelino MD

## 2021-03-27 LAB
GP B STREP DNA SPEC QL NAA+PROBE: POSITIVE
SPECIMEN SOURCE: ABNORMAL

## 2021-04-01 ENCOUNTER — PRENATAL OFFICE VISIT (OUTPATIENT)
Dept: OBGYN | Facility: CLINIC | Age: 31
End: 2021-04-01
Payer: COMMERCIAL

## 2021-04-01 VITALS — SYSTOLIC BLOOD PRESSURE: 98 MMHG | WEIGHT: 157 LBS | DIASTOLIC BLOOD PRESSURE: 60 MMHG | BODY MASS INDEX: 24.93 KG/M2

## 2021-04-01 DIAGNOSIS — O09.93 SUPERVISION OF HIGH RISK PREGNANCY IN THIRD TRIMESTER: ICD-10-CM

## 2021-04-01 PROCEDURE — 99207 PR PRENATAL VISIT: CPT | Performed by: OBSTETRICS & GYNECOLOGY

## 2021-04-02 NOTE — PROGRESS NOTES
Good fetal movement. No contractions. Exam is favorable. Leaning towards delivery at 39 weeks. Discussed increased risk of uterine rupture the more VBACs that are performed. Will be on site for induction if induced. Amina had a successful  after induction in her last pregnancy. She has a higher chance of success. Discussed PCN in labor due to being GBS positive.   RTC in one week  Bita Marcelino MD

## 2021-04-09 ENCOUNTER — MEDICAL CORRESPONDENCE (OUTPATIENT)
Dept: HEALTH INFORMATION MANAGEMENT | Facility: CLINIC | Age: 31
End: 2021-04-09

## 2021-04-09 ENCOUNTER — PRENATAL OFFICE VISIT (OUTPATIENT)
Dept: OBGYN | Facility: CLINIC | Age: 31
End: 2021-04-09
Payer: COMMERCIAL

## 2021-04-09 VITALS — SYSTOLIC BLOOD PRESSURE: 90 MMHG | BODY MASS INDEX: 24.71 KG/M2 | WEIGHT: 155.6 LBS | DIASTOLIC BLOOD PRESSURE: 58 MMHG

## 2021-04-09 DIAGNOSIS — O09.93 SUPERVISION OF HIGH RISK PREGNANCY IN THIRD TRIMESTER: ICD-10-CM

## 2021-04-09 PROCEDURE — 99207 PR PRENATAL VISIT: CPT | Performed by: OBSTETRICS & GYNECOLOGY

## 2021-04-09 NOTE — PROGRESS NOTES
Prenatal Visit: doing well. Good fetal movement. Intermittent contractions. Would like to be induced at 39 weeks. Recommend repeat visit early next week. Will move towards IOL at 39 weeks with amniotomy and pitocin if needed. Discussed risk of uterine rupture, this is decreased with spontaneous labor over an induction.  RTC in one week  Bita Marcelino MD

## 2021-04-12 ENCOUNTER — TELEPHONE (OUTPATIENT)
Dept: OBGYN | Facility: CLINIC | Age: 31
End: 2021-04-12

## 2021-04-12 ENCOUNTER — HOSPITAL ENCOUNTER (INPATIENT)
Facility: CLINIC | Age: 31
LOS: 3 days | Discharge: HOME | End: 2021-04-15
Attending: OBSTETRICS & GYNECOLOGY | Admitting: OBSTETRICS & GYNECOLOGY
Payer: COMMERCIAL

## 2021-04-12 ENCOUNTER — PRENATAL OFFICE VISIT (OUTPATIENT)
Dept: OBGYN | Facility: CLINIC | Age: 31
End: 2021-04-12
Payer: COMMERCIAL

## 2021-04-12 ENCOUNTER — NURSE TRIAGE (OUTPATIENT)
Dept: NURSING | Facility: CLINIC | Age: 31
End: 2021-04-12

## 2021-04-12 VITALS — DIASTOLIC BLOOD PRESSURE: 60 MMHG | WEIGHT: 154.2 LBS | SYSTOLIC BLOOD PRESSURE: 100 MMHG | BODY MASS INDEX: 24.49 KG/M2

## 2021-04-12 DIAGNOSIS — O34.219 VBAC, DELIVERED: Primary | ICD-10-CM

## 2021-04-12 DIAGNOSIS — O09.93 SUPERVISION OF HIGH RISK PREGNANCY IN THIRD TRIMESTER: Primary | ICD-10-CM

## 2021-04-12 LAB
ABO + RH BLD: NORMAL
ABO + RH BLD: NORMAL
BLD GP AB SCN SERPL QL: NORMAL
BLOOD BANK CMNT PATIENT-IMP: NORMAL
LABORATORY COMMENT REPORT: NORMAL
SARS-COV-2 RNA RESP QL NAA+PROBE: NEGATIVE
SPECIMEN EXP DATE BLD: NORMAL
SPECIMEN SOURCE: NORMAL

## 2021-04-12 PROCEDURE — 86850 RBC ANTIBODY SCREEN: CPT | Performed by: OBSTETRICS & GYNECOLOGY

## 2021-04-12 PROCEDURE — 59025 FETAL NON-STRESS TEST: CPT

## 2021-04-12 PROCEDURE — 250N000011 HC RX IP 250 OP 636: Performed by: OBSTETRICS & GYNECOLOGY

## 2021-04-12 PROCEDURE — 36415 COLL VENOUS BLD VENIPUNCTURE: CPT | Performed by: OBSTETRICS & GYNECOLOGY

## 2021-04-12 PROCEDURE — 86901 BLOOD TYPING SEROLOGIC RH(D): CPT | Performed by: OBSTETRICS & GYNECOLOGY

## 2021-04-12 PROCEDURE — 86900 BLOOD TYPING SEROLOGIC ABO: CPT | Performed by: OBSTETRICS & GYNECOLOGY

## 2021-04-12 PROCEDURE — 86780 TREPONEMA PALLIDUM: CPT | Performed by: OBSTETRICS & GYNECOLOGY

## 2021-04-12 PROCEDURE — 120N000001 HC R&B MED SURG/OB

## 2021-04-12 PROCEDURE — 87635 SARS-COV-2 COVID-19 AMP PRB: CPT | Performed by: OBSTETRICS & GYNECOLOGY

## 2021-04-12 PROCEDURE — G0463 HOSPITAL OUTPT CLINIC VISIT: HCPCS | Mod: 25

## 2021-04-12 PROCEDURE — 99207 PR PRENATAL VISIT: CPT | Performed by: OBSTETRICS & GYNECOLOGY

## 2021-04-12 RX ORDER — SODIUM CHLORIDE, SODIUM LACTATE, POTASSIUM CHLORIDE, CALCIUM CHLORIDE 600; 310; 30; 20 MG/100ML; MG/100ML; MG/100ML; MG/100ML
INJECTION, SOLUTION INTRAVENOUS CONTINUOUS
Status: DISCONTINUED | OUTPATIENT
Start: 2021-04-12 | End: 2021-04-13

## 2021-04-12 RX ORDER — NALOXONE HYDROCHLORIDE 0.4 MG/ML
0.4 INJECTION, SOLUTION INTRAMUSCULAR; INTRAVENOUS; SUBCUTANEOUS
Status: DISCONTINUED | OUTPATIENT
Start: 2021-04-12 | End: 2021-04-13

## 2021-04-12 RX ORDER — IBUPROFEN 400 MG/1
800 TABLET, FILM COATED ORAL
Status: DISCONTINUED | OUTPATIENT
Start: 2021-04-12 | End: 2021-04-15 | Stop reason: HOSPADM

## 2021-04-12 RX ORDER — ONDANSETRON 2 MG/ML
4 INJECTION INTRAMUSCULAR; INTRAVENOUS EVERY 6 HOURS PRN
Status: DISCONTINUED | OUTPATIENT
Start: 2021-04-12 | End: 2021-04-13

## 2021-04-12 RX ORDER — PENICILLIN G POTASSIUM 5000000 [IU]/1
5 INJECTION, POWDER, FOR SOLUTION INTRAMUSCULAR; INTRAVENOUS ONCE
Status: COMPLETED | OUTPATIENT
Start: 2021-04-12 | End: 2021-04-13

## 2021-04-12 RX ORDER — NALOXONE HYDROCHLORIDE 0.4 MG/ML
0.2 INJECTION, SOLUTION INTRAMUSCULAR; INTRAVENOUS; SUBCUTANEOUS
Status: DISCONTINUED | OUTPATIENT
Start: 2021-04-12 | End: 2021-04-13

## 2021-04-12 RX ORDER — OXYTOCIN 10 [USP'U]/ML
10 INJECTION, SOLUTION INTRAMUSCULAR; INTRAVENOUS
Status: DISCONTINUED | OUTPATIENT
Start: 2021-04-12 | End: 2021-04-13

## 2021-04-12 RX ORDER — FENTANYL CITRATE 50 UG/ML
50-100 INJECTION, SOLUTION INTRAMUSCULAR; INTRAVENOUS
Status: DISCONTINUED | OUTPATIENT
Start: 2021-04-12 | End: 2021-04-13

## 2021-04-12 RX ORDER — ACETAMINOPHEN 325 MG/1
650 TABLET ORAL EVERY 4 HOURS PRN
Status: DISCONTINUED | OUTPATIENT
Start: 2021-04-12 | End: 2021-04-13

## 2021-04-12 RX ORDER — TRANEXAMIC ACID 10 MG/ML
1 INJECTION, SOLUTION INTRAVENOUS EVERY 30 MIN PRN
Status: DISCONTINUED | OUTPATIENT
Start: 2021-04-12 | End: 2021-04-13

## 2021-04-12 RX ORDER — CARBOPROST TROMETHAMINE 250 UG/ML
250 INJECTION, SOLUTION INTRAMUSCULAR
Status: DISCONTINUED | OUTPATIENT
Start: 2021-04-12 | End: 2021-04-15 | Stop reason: HOSPADM

## 2021-04-12 RX ORDER — OXYCODONE AND ACETAMINOPHEN 5; 325 MG/1; MG/1
1 TABLET ORAL
Status: DISCONTINUED | OUTPATIENT
Start: 2021-04-12 | End: 2021-04-15 | Stop reason: HOSPADM

## 2021-04-12 RX ORDER — OXYTOCIN/0.9 % SODIUM CHLORIDE 30/500 ML
100-340 PLASTIC BAG, INJECTION (ML) INTRAVENOUS CONTINUOUS PRN
Status: COMPLETED | OUTPATIENT
Start: 2021-04-12 | End: 2021-04-13

## 2021-04-12 RX ORDER — METHYLERGONOVINE MALEATE 0.2 MG/ML
200 INJECTION INTRAVENOUS
Status: COMPLETED | OUTPATIENT
Start: 2021-04-12 | End: 2021-04-13

## 2021-04-12 RX ADMIN — PENICILLIN G POTASSIUM 5 MILLION UNITS: 5000000 POWDER, FOR SOLUTION INTRAMUSCULAR; INTRAPLEURAL; INTRATHECAL; INTRAVENOUS at 23:07

## 2021-04-12 ASSESSMENT — MIFFLIN-ST. JEOR: SCORE: 1435.29

## 2021-04-12 NOTE — TELEPHONE ENCOUNTER
Type of Paperwork received:  fmla     Date Rcvd:  na    Rcvd From (Company name): [facility]    Provider:  Ryland    Placed on Provider Cart Date:  Na      Emailed to [email or web address] & [email or web address]    [facility]12/[facility]

## 2021-04-12 NOTE — PROGRESS NOTES
Doing well. Would like to be induced at 39 weeks. Membrane sweep done today. Will schedule on Wednesday if no labor before that.  Bita Marcelino MD

## 2021-04-13 ENCOUNTER — ANESTHESIA (OUTPATIENT)
Dept: OBGYN | Facility: CLINIC | Age: 31
End: 2021-04-13
Payer: COMMERCIAL

## 2021-04-13 ENCOUNTER — ANESTHESIA EVENT (OUTPATIENT)
Dept: OBGYN | Facility: CLINIC | Age: 31
End: 2021-04-13
Payer: COMMERCIAL

## 2021-04-13 LAB
ERYTHROCYTE [DISTWIDTH] IN BLOOD BY AUTOMATED COUNT: 13.2 % (ref 10–15)
HCT VFR BLD AUTO: 33.6 % (ref 35–47)
HGB BLD-MCNC: 10.8 G/DL (ref 11.7–15.7)
MCH RBC QN AUTO: 27.8 PG (ref 26.5–33)
MCHC RBC AUTO-ENTMCNC: 32.1 G/DL (ref 31.5–36.5)
MCV RBC AUTO: 86 FL (ref 78–100)
PLATELET # BLD AUTO: 202 10E9/L (ref 150–450)
RBC # BLD AUTO: 3.89 10E12/L (ref 3.8–5.2)
T PALLIDUM AB SER QL: NONREACTIVE
WBC # BLD AUTO: 12.5 10E9/L (ref 4–11)

## 2021-04-13 PROCEDURE — 250N000011 HC RX IP 250 OP 636: Performed by: ANESTHESIOLOGY

## 2021-04-13 PROCEDURE — 370N000003 HC ANESTHESIA WARD SERVICE

## 2021-04-13 PROCEDURE — 250N000009 HC RX 250: Performed by: OBSTETRICS & GYNECOLOGY

## 2021-04-13 PROCEDURE — 250N000011 HC RX IP 250 OP 636: Performed by: OBSTETRICS & GYNECOLOGY

## 2021-04-13 PROCEDURE — 3E0R3BZ INTRODUCTION OF ANESTHETIC AGENT INTO SPINAL CANAL, PERCUTANEOUS APPROACH: ICD-10-PCS | Performed by: ANESTHESIOLOGY

## 2021-04-13 PROCEDURE — 250N000013 HC RX MED GY IP 250 OP 250 PS 637: Performed by: OBSTETRICS & GYNECOLOGY

## 2021-04-13 PROCEDURE — 00HU33Z INSERTION OF INFUSION DEVICE INTO SPINAL CANAL, PERCUTANEOUS APPROACH: ICD-10-PCS | Performed by: ANESTHESIOLOGY

## 2021-04-13 PROCEDURE — 722N000001 HC LABOR CARE VAGINAL DELIVERY SINGLE

## 2021-04-13 PROCEDURE — 36415 COLL VENOUS BLD VENIPUNCTURE: CPT | Performed by: OBSTETRICS & GYNECOLOGY

## 2021-04-13 PROCEDURE — 250N000009 HC RX 250: Performed by: ANESTHESIOLOGY

## 2021-04-13 PROCEDURE — 85027 COMPLETE CBC AUTOMATED: CPT | Performed by: OBSTETRICS & GYNECOLOGY

## 2021-04-13 PROCEDURE — 0KQM0ZZ REPAIR PERINEUM MUSCLE, OPEN APPROACH: ICD-10-PCS | Performed by: OBSTETRICS & GYNECOLOGY

## 2021-04-13 PROCEDURE — 10907ZC DRAINAGE OF AMNIOTIC FLUID, THERAPEUTIC FROM PRODUCTS OF CONCEPTION, VIA NATURAL OR ARTIFICIAL OPENING: ICD-10-PCS | Performed by: OBSTETRICS & GYNECOLOGY

## 2021-04-13 PROCEDURE — 120N000012 HC R&B POSTPARTUM

## 2021-04-13 PROCEDURE — 258N000003 HC RX IP 258 OP 636: Performed by: OBSTETRICS & GYNECOLOGY

## 2021-04-13 RX ORDER — FENTANYL CITRATE 50 UG/ML
100 INJECTION, SOLUTION INTRAMUSCULAR; INTRAVENOUS ONCE
Status: DISCONTINUED | OUTPATIENT
Start: 2021-04-13 | End: 2021-04-15 | Stop reason: HOSPADM

## 2021-04-13 RX ORDER — MODIFIED LANOLIN
OINTMENT (GRAM) TOPICAL
Status: DISCONTINUED | OUTPATIENT
Start: 2021-04-13 | End: 2021-04-15 | Stop reason: HOSPADM

## 2021-04-13 RX ORDER — CEFAZOLIN SODIUM 2 G/100ML
2 INJECTION, SOLUTION INTRAVENOUS EVERY 8 HOURS
Status: COMPLETED | OUTPATIENT
Start: 2021-04-13 | End: 2021-04-14

## 2021-04-13 RX ORDER — OXYTOCIN/0.9 % SODIUM CHLORIDE 30/500 ML
100 PLASTIC BAG, INJECTION (ML) INTRAVENOUS CONTINUOUS
Status: DISCONTINUED | OUTPATIENT
Start: 2021-04-13 | End: 2021-04-15 | Stop reason: HOSPADM

## 2021-04-13 RX ORDER — AMOXICILLIN 250 MG
1 CAPSULE ORAL 2 TIMES DAILY
Status: DISCONTINUED | OUTPATIENT
Start: 2021-04-13 | End: 2021-04-15 | Stop reason: HOSPADM

## 2021-04-13 RX ORDER — IBUPROFEN 400 MG/1
800 TABLET, FILM COATED ORAL EVERY 6 HOURS PRN
Status: DISCONTINUED | OUTPATIENT
Start: 2021-04-13 | End: 2021-04-15 | Stop reason: HOSPADM

## 2021-04-13 RX ORDER — FENTANYL CITRATE 50 UG/ML
INJECTION, SOLUTION INTRAMUSCULAR; INTRAVENOUS PRN
Status: DISCONTINUED | OUTPATIENT
Start: 2021-04-13 | End: 2021-04-14 | Stop reason: HOSPADM

## 2021-04-13 RX ORDER — NALOXONE HYDROCHLORIDE 0.4 MG/ML
0.4 INJECTION, SOLUTION INTRAMUSCULAR; INTRAVENOUS; SUBCUTANEOUS
Status: DISCONTINUED | OUTPATIENT
Start: 2021-04-13 | End: 2021-04-15 | Stop reason: HOSPADM

## 2021-04-13 RX ORDER — NALBUPHINE HYDROCHLORIDE 10 MG/ML
2.5-5 INJECTION, SOLUTION INTRAMUSCULAR; INTRAVENOUS; SUBCUTANEOUS EVERY 6 HOURS PRN
Status: DISCONTINUED | OUTPATIENT
Start: 2021-04-13 | End: 2021-04-15 | Stop reason: HOSPADM

## 2021-04-13 RX ORDER — ACETAMINOPHEN 325 MG/1
650 TABLET ORAL EVERY 4 HOURS PRN
Status: DISCONTINUED | OUTPATIENT
Start: 2021-04-13 | End: 2021-04-15 | Stop reason: HOSPADM

## 2021-04-13 RX ORDER — OXYTOCIN/0.9 % SODIUM CHLORIDE 30/500 ML
1-24 PLASTIC BAG, INJECTION (ML) INTRAVENOUS CONTINUOUS
Status: DISCONTINUED | OUTPATIENT
Start: 2021-04-13 | End: 2021-04-13

## 2021-04-13 RX ORDER — EPHEDRINE SULFATE 50 MG/ML
5 INJECTION, SOLUTION INTRAMUSCULAR; INTRAVENOUS; SUBCUTANEOUS
Status: DISCONTINUED | OUTPATIENT
Start: 2021-04-13 | End: 2021-04-15 | Stop reason: HOSPADM

## 2021-04-13 RX ORDER — LIDOCAINE 40 MG/G
CREAM TOPICAL
Status: DISCONTINUED | OUTPATIENT
Start: 2021-04-13 | End: 2021-04-13

## 2021-04-13 RX ORDER — OXYTOCIN 10 [USP'U]/ML
10 INJECTION, SOLUTION INTRAMUSCULAR; INTRAVENOUS
Status: DISCONTINUED | OUTPATIENT
Start: 2021-04-13 | End: 2021-04-15 | Stop reason: HOSPADM

## 2021-04-13 RX ORDER — CALCIUM CARBONATE 500 MG/1
1000 TABLET, CHEWABLE ORAL EVERY 4 HOURS PRN
Status: DISCONTINUED | OUTPATIENT
Start: 2021-04-13 | End: 2021-04-15 | Stop reason: HOSPADM

## 2021-04-13 RX ORDER — CARBOPROST TROMETHAMINE 250 UG/ML
250 INJECTION, SOLUTION INTRAMUSCULAR
Status: DISCONTINUED | OUTPATIENT
Start: 2021-04-13 | End: 2021-04-15 | Stop reason: HOSPADM

## 2021-04-13 RX ORDER — ROPIVACAINE HYDROCHLORIDE 2 MG/ML
INJECTION, SOLUTION EPIDURAL; INFILTRATION; PERINEURAL PRN
Status: DISCONTINUED | OUTPATIENT
Start: 2021-04-13 | End: 2021-04-14 | Stop reason: HOSPADM

## 2021-04-13 RX ORDER — ONDANSETRON 2 MG/ML
4 INJECTION INTRAMUSCULAR; INTRAVENOUS EVERY 6 HOURS PRN
Status: DISCONTINUED | OUTPATIENT
Start: 2021-04-13 | End: 2021-04-15 | Stop reason: HOSPADM

## 2021-04-13 RX ORDER — NALOXONE HYDROCHLORIDE 0.4 MG/ML
0.2 INJECTION, SOLUTION INTRAMUSCULAR; INTRAVENOUS; SUBCUTANEOUS
Status: DISCONTINUED | OUTPATIENT
Start: 2021-04-13 | End: 2021-04-15 | Stop reason: HOSPADM

## 2021-04-13 RX ORDER — LIDOCAINE HYDROCHLORIDE AND EPINEPHRINE 15; 5 MG/ML; UG/ML
INJECTION, SOLUTION EPIDURAL PRN
Status: DISCONTINUED | OUTPATIENT
Start: 2021-04-13 | End: 2021-04-14 | Stop reason: HOSPADM

## 2021-04-13 RX ORDER — HYDROCORTISONE 2.5 %
CREAM (GRAM) TOPICAL 3 TIMES DAILY PRN
Status: DISCONTINUED | OUTPATIENT
Start: 2021-04-13 | End: 2021-04-15 | Stop reason: HOSPADM

## 2021-04-13 RX ORDER — AMOXICILLIN 250 MG
2 CAPSULE ORAL 2 TIMES DAILY
Status: DISCONTINUED | OUTPATIENT
Start: 2021-04-13 | End: 2021-04-15 | Stop reason: HOSPADM

## 2021-04-13 RX ORDER — OXYTOCIN/0.9 % SODIUM CHLORIDE 30/500 ML
340 PLASTIC BAG, INJECTION (ML) INTRAVENOUS CONTINUOUS PRN
Status: DISCONTINUED | OUTPATIENT
Start: 2021-04-13 | End: 2021-04-15 | Stop reason: HOSPADM

## 2021-04-13 RX ORDER — BISACODYL 10 MG
10 SUPPOSITORY, RECTAL RECTAL DAILY PRN
Status: DISCONTINUED | OUTPATIENT
Start: 2021-04-15 | End: 2021-04-15 | Stop reason: HOSPADM

## 2021-04-13 RX ORDER — METHYLERGONOVINE MALEATE 0.2 MG/ML
200 INJECTION INTRAVENOUS
Status: DISCONTINUED | OUTPATIENT
Start: 2021-04-13 | End: 2021-04-15 | Stop reason: HOSPADM

## 2021-04-13 RX ORDER — OXYCODONE HYDROCHLORIDE 5 MG/1
5 TABLET ORAL EVERY 4 HOURS PRN
Status: DISCONTINUED | OUTPATIENT
Start: 2021-04-13 | End: 2021-04-15 | Stop reason: HOSPADM

## 2021-04-13 RX ORDER — MISOPROSTOL 200 UG/1
800 TABLET ORAL
Status: DISCONTINUED | OUTPATIENT
Start: 2021-04-13 | End: 2021-04-15 | Stop reason: HOSPADM

## 2021-04-13 RX ORDER — TRANEXAMIC ACID 10 MG/ML
1 INJECTION, SOLUTION INTRAVENOUS EVERY 30 MIN PRN
Status: DISCONTINUED | OUTPATIENT
Start: 2021-04-13 | End: 2021-04-15 | Stop reason: HOSPADM

## 2021-04-13 RX ORDER — ROPIVACAINE HYDROCHLORIDE 2 MG/ML
10 INJECTION, SOLUTION EPIDURAL; INFILTRATION; PERINEURAL ONCE
Status: DISCONTINUED | OUTPATIENT
Start: 2021-04-13 | End: 2021-04-15 | Stop reason: HOSPADM

## 2021-04-13 RX ORDER — ONDANSETRON 4 MG/1
4 TABLET, ORALLY DISINTEGRATING ORAL EVERY 6 HOURS PRN
Status: DISCONTINUED | OUTPATIENT
Start: 2021-04-13 | End: 2021-04-15 | Stop reason: HOSPADM

## 2021-04-13 RX ORDER — TERBUTALINE SULFATE 1 MG/ML
0.25 INJECTION, SOLUTION SUBCUTANEOUS
Status: DISCONTINUED | OUTPATIENT
Start: 2021-04-13 | End: 2021-04-13

## 2021-04-13 RX ADMIN — Medication 2 MILLI-UNITS/MIN: at 08:12

## 2021-04-13 RX ADMIN — ONDANSETRON 4 MG: 2 INJECTION INTRAMUSCULAR; INTRAVENOUS at 06:41

## 2021-04-13 RX ADMIN — CEFAZOLIN SODIUM 2 G: 2 INJECTION, SOLUTION INTRAVENOUS at 11:38

## 2021-04-13 RX ADMIN — ROPIVACAINE HYDROCHLORIDE 5 ML: 2 INJECTION, SOLUTION EPIDURAL; INFILTRATION at 00:50

## 2021-04-13 RX ADMIN — CALCIUM CARBONATE (ANTACID) CHEW TAB 500 MG 1000 MG: 500 CHEW TAB at 07:06

## 2021-04-13 RX ADMIN — FENTANYL CITRATE 100 MCG: 50 INJECTION, SOLUTION INTRAMUSCULAR; INTRAVENOUS at 00:50

## 2021-04-13 RX ADMIN — Medication 12 ML/HR: at 01:02

## 2021-04-13 RX ADMIN — CEFAZOLIN SODIUM 2 G: 2 INJECTION, SOLUTION INTRAVENOUS at 19:40

## 2021-04-13 RX ADMIN — IBUPROFEN 800 MG: 400 TABLET ORAL at 10:10

## 2021-04-13 RX ADMIN — IBUPROFEN 800 MG: 400 TABLET ORAL at 22:37

## 2021-04-13 RX ADMIN — ACETAMINOPHEN 650 MG: 325 TABLET, FILM COATED ORAL at 14:29

## 2021-04-13 RX ADMIN — LIDOCAINE HYDROCHLORIDE AND EPINEPHRINE 3 ML: 15; 5 INJECTION, SOLUTION EPIDURAL at 00:48

## 2021-04-13 RX ADMIN — SODIUM CHLORIDE, POTASSIUM CHLORIDE, SODIUM LACTATE AND CALCIUM CHLORIDE 1000 ML: 600; 310; 30; 20 INJECTION, SOLUTION INTRAVENOUS at 00:17

## 2021-04-13 RX ADMIN — Medication 340 ML/HR: at 09:15

## 2021-04-13 RX ADMIN — TRANEXAMIC ACID 1 G: 10 INJECTION, SOLUTION INTRAVENOUS at 09:14

## 2021-04-13 RX ADMIN — IBUPROFEN 800 MG: 400 TABLET ORAL at 15:53

## 2021-04-13 RX ADMIN — SODIUM CHLORIDE 2.5 MILLION UNITS: 9 INJECTION, SOLUTION INTRAVENOUS at 03:06

## 2021-04-13 RX ADMIN — SENNOSIDES AND DOCUSATE SODIUM 1 TABLET: 8.6; 5 TABLET ORAL at 19:40

## 2021-04-13 RX ADMIN — ROPIVACAINE HYDROCHLORIDE 5 ML: 2 INJECTION, SOLUTION EPIDURAL; INFILTRATION at 00:53

## 2021-04-13 RX ADMIN — SODIUM CHLORIDE 2.5 MILLION UNITS: 9 INJECTION, SOLUTION INTRAVENOUS at 07:12

## 2021-04-13 RX ADMIN — ACETAMINOPHEN 650 MG: 325 TABLET, FILM COATED ORAL at 10:10

## 2021-04-13 RX ADMIN — METHYLERGONOVINE MALEATE 200 MCG: 0.2 INJECTION INTRAVENOUS at 09:16

## 2021-04-13 RX ADMIN — ACETAMINOPHEN 650 MG: 325 TABLET, FILM COATED ORAL at 22:37

## 2021-04-13 ASSESSMENT — ACTIVITIES OF DAILY LIVING (ADL)
TOILETING_ISSUES: NO
NUMBER_OF_TIMES_PATIENT_HAS_FALLEN_WITHIN_LAST_SIX_MONTHS: 1
FALL_HISTORY_WITHIN_LAST_SIX_MONTHS: YES

## 2021-04-13 ASSESSMENT — LIFESTYLE VARIABLES: TOBACCO_USE: 1

## 2021-04-13 NOTE — PLAN OF CARE
Pt admitted to 220 at 2305, oriented to room and call light, external monitors applied with verbal consent. , Rodrick, at home with other kids but is on his way in. PCN started for GBS prophylaxis. Pt requesting epidural at 0015, fluid bolus started. Dr. Sharma, MDA at bedside for epidural at 0035, pt positioned at side of bed. Procedure complete and pt helped into left tilt position at 0055. Continuous monitor at bedside by this RN, pt and baby tolerated procedure well. Pt denies pain, Germain catheter placed, SVE 5-6cm, pt settled and plans to try to sleep. Will cont to monitor.

## 2021-04-13 NOTE — PLAN OF CARE
Assumed care of patient at approx 0715. Pt comfortable with epidural. External monitors. FHTs moderate variability, +accels, no decels noted. Membranes intact. Dr Marcelino at bedside at approx 0750 for assessment. SVE 6/80/-2. AROM completed for large amount clear fluid. FSE placed by MD. Pitocin started for augmentation. Spouse supportive at bedside. Cont to monitor and assess.

## 2021-04-13 NOTE — PROGRESS NOTES
Sustained fever after delivery. Fever after TXA and methergine. Will start Ancef and closely monitor her temperature. If it persists with Ancef will change to Clindamycin and Gentamicin.  Bita Marcelino MD

## 2021-04-13 NOTE — PLAN OF CARE
The pt arrived to the unit at 1200, initial teaching and safety measures were reviewed with the pt and her spouse.  Temp 101, 98.2; receiving IV abx, and Pit completed.  The pt reported intermittent chills and nausea initially, but stated current relief.  She ambulated to the BR with SBA and voided x2.  She's attempting to breastfeed, but her daughter's sleepy and disinterested at the breast.  Lactation is following and hand expression was encouraged.

## 2021-04-13 NOTE — LACTATION NOTE
Initial Lactation visit with Amina, significant other uYriy & baby girl Janel. Amina reported breastfeeding went well with her last two children.  Amina shared baby  well her first time after birth, and was skin to skin at time of LC visit. LC assisted to wake baby, changed diaper, then placed back skin to skin. She remained sleepy. Discussed typical  feeding patterns, encouraged hand expression and Amina was easily able to express drops of colostrum to nipple.      Recommend unlimited, frequent breast feedings: At least 8 - 12 times every 24 hours. Recommended rooming in. Instructed in hand expression. Avoid pacifiers and supplementation with formula unless medically indicated. Explained benefits of holding baby skin on skin to help promote better breastfeeding outcomes. Amina has a breast pump for home use. Will revisit as needed.    Dayna Coronel RN-C, IBCLC, MNN, PHN, BSN

## 2021-04-13 NOTE — TELEPHONE ENCOUNTER
Patient reports membrane sweep this am now she says she is in labor since 1pm, has lower abd pressure, is a , positive for GBS, was 4 cm dilated at office visit.    Scheduled to be induced on Friday        OB Triage Call    Reason for call: labor    Assessment: needs to be seen in L&D    Plan: Call L&D for bed    Patient plans to deliver at [facility]  Patient's primary OB Provider is Graham.    Patient's primary OB provider is a Physician.  Labor and delivery at [facility] ([phone]) notified of patient's pending arrival.  Report given to Rafael.      38w3d  Estimated Date of Delivery: 2021        OB History    Para Term  AB Living   5 3 3 0 1 3   SAB TAB Ectopic Multiple Live Births   1 0 0 0 3      # Outcome Date GA Lbr Shaji/2nd Weight Sex Delivery Anes PTL Lv   5 Current            4 Term 19 39w0d 07:00 / 00:44 3.37 kg (7 lb 6.9 oz) M  EPI N BLANE      Name: ILEANA ROPER      Apgar1: 8  Apgar5: 9   3 Term 16   4.56 kg (10 lb 0.9 oz) M CS-LTranv   BLANE      Complications: Breech birth   2 Term 14   3.022 kg (6 lb 10.6 oz) M Vag-Spont   BLANE   1 SAB                Lab Results   Component Value Date    GBS Positive (A) 2021          Tucker Vilchis RN 21 8:06 PM  [facility] Nurse Advisor    Reason for Disposition    [1] History of prior delivery (multipara) AND [2] contractions < 10 minutes apart AND [3] present 1 hour    Additional Information    Negative: Passed out (i.e., lost consciousness, collapsed and was not responding)    Negative: Shock suspected (e.g., cold/pale/clammy skin, too weak to stand, low BP, rapid pulse)    Negative: Difficult to awaken or acting confused (e.g., disoriented, slurred speech)    Negative: [1] SEVERE abdominal pain (e.g., excruciating) AND [2] constant AND [3] present > 1 hour    Negative: Severe bleeding (e.g., continuous red blood from vagina, or large blood clots)    Negative: Umbilical cord  "hanging out of the vagina (shiny, white, curled appearance, \"like telephone cord\")    Negative: Uncontrollable urge to push (i.e., feels like baby is coming out now)    Negative: Can see baby    Negative: Sounds like a life-threatening emergency to the triager    Negative: [1] First baby (primipara) AND [2] contractions < 6 minutes apart  AND [3] present 2 hours    Protocols used: PREGNANCY - LABOR-A-AH      "

## 2021-04-13 NOTE — ANESTHESIA PREPROCEDURE EVALUATION
Anesthesia Pre-Procedure Evaluation    Patient: Amina Aguayo   MRN: [id number] : 1990        Preoperative Diagnosis: * No surgery found *   Procedure :      Past Medical History:   Diagnosis Date     Anorexia      Bulimia      Depressive disorder     PP anxiety with last baby; was on Zoloft      Past Surgical History:   Procedure Laterality Date     APPENDECTOMY        SECTION       ORTHOPEDIC SURGERY      wrist surgey      No Known Allergies   Social History     Tobacco Use     Smoking status: Former Smoker     Quit date: 10/8/2013     Years since quittin.5     Smokeless tobacco: Never Used   Substance Use Topics     Alcohol use: No     Frequency: Never     Comment: few times a week-none while pregnant      Wt Readings from Last 1 Encounters:   21 69.9 kg (154 lb)      No Known Allergies  Prior to Admission medications    Medication Sig Start Date End Date Taking? Authorizing Provider   doxylamine (UNISOM) 25 MG TABS tablet Take 25 mg by mouth At Bedtime   Yes Reported, Patient   Prenatal Vit-Fe Fumarate-FA (PNV PRENATAL PLUS MULTIVITAMIN) 27-1 MG TABS per tablet Take 1 tablet by mouth daily   Yes Reported, Patient   \    Anesthesia Evaluation            ROS/MED HX  ENT/Pulmonary:     (+) tobacco use, Past use,     Neurologic:       Cardiovascular:       METS/Exercise Tolerance:     Hematologic:       Musculoskeletal:       GI/Hepatic:       Renal/Genitourinary:       Endo:       Psychiatric/Substance Use:     (+) psychiatric history anxiety and depression     Infectious Disease:       Malignancy:       Other:            Physical Exam    Airway        Mallampati: II   TM distance: > 3 FB   Neck ROM: full   Mouth opening: > 3 cm    Respiratory Devices and Support         Dental           Cardiovascular             Pulmonary                   OUTSIDE LABS:  CBC:   Lab Results   Component Value Date    WBC 7.8 2021    WBC 6.4 10/15/2020    HGB 11.7 2021    HGB 14.2  10/15/2020    HCT 35.1 01/28/2021    HCT 40.5 10/15/2020     01/28/2021     10/15/2020     BMP: No results found for: NA, POTASSIUM, CHLORIDE, CO2, BUN, CR, GLC  COAGS: No results found for: PTT, INR, FIBR  POC:   Lab Results   Component Value Date    HCG Negative 09/30/2006     HEPATIC: No results found for: ALBUMIN, PROTTOTAL, ALT, AST, GGT, ALKPHOS, BILITOTAL, BILIDIRECT, VERA  OTHER:   Lab Results   Component Value Date    TSH 1.37 05/15/2019    T4 0.92 05/15/2019    CRP <2.9 05/15/2019    SED 6 05/15/2019       Anesthesia Plan    ASA Status:  2      Anesthesia Type: Epidural.              Consents    Anesthesia Plan(s) and associated risks, benefits, and realistic alternatives discussed. Questions answered and patient/representative(s) expressed understanding.     - Discussed with:  Patient         Postoperative Care            Comments:                Diane Sharma MD

## 2021-04-13 NOTE — PROGRESS NOTES
[facility] Labor and Delivery Progress Note    Amina Aguayo MRN# [id number]   Age: 30 year old YOB: 1990           Subjective:   Comfortable with the epidural           Objective:     Patient Vitals for the past 24 hrs:   BP Temp Temp src Pulse Resp SpO2 Height Weight BMI (Calculated) Oximeter Heart Rate   04/13/21 0730 -- 98.5  F (36.9  C) Temporal -- -- -- -- -- -- --   04/13/21 0700 96/50 -- -- -- -- 100 % -- -- -- 78 bpm   04/13/21 0600 99/54 98.9  F (37.2  C) Temporal -- -- 96 % -- -- -- 86 bpm   04/13/21 0530 98/56 -- -- -- -- 96 % -- -- -- 65 bpm   04/13/21 0500 99/67 -- -- -- -- 95 % -- -- -- 65 bpm   04/13/21 0430 94/55 -- -- -- -- 95 % -- -- -- 65 bpm   04/13/21 0400 103/52 97.9  F (36.6  C) Temporal -- -- 96 % -- -- -- 67 bpm   04/13/21 0330 95/52 -- -- -- -- 95 % -- -- -- 58 bpm   04/13/21 0300 (!) 84/49 -- -- -- -- 95 % -- -- -- 58 bpm   04/13/21 0225 96/52 -- -- -- -- 94 % -- -- -- 61 bpm   04/13/21 0157 95/53 -- -- -- -- -- -- -- -- 60 bpm   04/13/21 0154 95/54 -- -- -- -- 94 % -- -- -- 57 bpm   04/13/21 0149 95/56 -- -- -- -- 95 % -- -- -- 59 bpm   04/13/21 0144 94/51 -- -- -- -- 94 % -- -- -- 72 bpm   04/13/21 0137 (!) 89/50 -- -- -- -- 94 % -- -- -- 64 bpm   04/13/21 0134 93/52 -- -- -- -- 97 % -- -- -- 71 bpm   04/13/21 0133 (!) 87/49 -- -- -- -- -- -- -- -- 68 bpm   04/13/21 0131 100/54 -- -- -- -- -- -- -- -- 73 bpm   04/13/21 0129 101/59 -- -- -- -- 97 % -- -- -- 68 bpm   04/13/21 0127 112/55 -- -- -- -- -- -- -- -- 67 bpm   04/13/21 0125 94/52 -- -- -- -- 98 % -- -- -- 67 bpm   04/13/21 0123 101/54 -- -- -- -- -- -- -- -- 61 bpm   04/13/21 0121 104/57 -- -- -- -- -- -- -- -- 64 bpm   04/13/21 0119 108/55 -- -- -- -- 96 % -- -- -- 63 bpm   04/13/21 0117 104/58 -- -- -- -- -- -- -- -- 83 bpm   04/13/21 0115 100/56 -- -- -- -- 97 % -- -- -- 65 bpm   04/13/21 0113 99/54 -- -- -- -- -- -- -- -- 68 bpm   04/13/21 0111 99/56 -- -- -- -- -- -- -- -- 67 bpm   04/13/21  "0109 100/58 -- -- -- -- 97 % -- -- -- 66 bpm   21 100/59 -- -- -- -- -- -- -- -- 69 bpm   21 98/57 -- -- -- -- -- -- -- -- 68 bpm   21 94/55 -- -- -- -- 97 % -- -- -- 63 bpm   21 93/51 -- -- -- -- -- -- -- -- 62 bpm   21 99/56 -- -- -- -- 96 % -- -- -- 71 bpm   21 100/52 -- -- -- -- 98 % -- -- -- 75 bpm   217 110/58 98.4  F (36.9  C) Temporal -- -- -- -- -- -- 80 bpm   21 111/65 99  F (37.2  C) Temporal 84 16 -- 1.676 m (5' 6\") 69.9 kg (154 lb) 24.86 --         Cervical Exam: /-1    Position: Mid    Membranes: AROM with FSE    Fetal Heart Rate:    Monitor: external US    Variability: moderate (amplitude range 6 to 25 bpm)    Baseline Rate: normal range    Fetal Heart Rate Tracinbpm/+accels/ no decels          Assessment:   Amina Aguayo is a 30 year old  who is 38w4d here with early labor, 1  followed by a previous  section for breech and previous  X 1          Plan:   TOLAC consent form signed as it wasn't previously when admitted. Discussed the risks of a trial of labor after  section. Jeannette understands that this risk increases with augmentation. Discussed need for oxytocin due to inadequate contraction pattern. Understands risk to herself and to her fetus in the event of a uterine rupture. Discussed high chance of successful TOLAC due to two prior vaginal deliveries.    Bita Marcelino MD    "

## 2021-04-13 NOTE — ANESTHESIA PROCEDURE NOTES
Epidural catheter Procedure Note  Pre-Procedure   Staff -        Anesthesiologist:  Diane Sharma MD       Performed By: anesthesiologist       Location: OB       Pre-Anesthestic Checklist: patient identified, IV checked, site marked, risks and benefits discussed, informed consent, monitors and equipment checked, pre-op evaluation and at physician/surgeon's request  Timeout:       Correct Patient: Yes        Correct Procedure: Yes        Correct Site: Yes        Correct Position: Yes   Procedure Documentation  Procedure: epidural catheter       Patient Position: sitting       Skin prep: Betadine       Local skin infiltrated with 1 mL of 1% lidocaine.        Insertion Site: L2-3. (midline approach).       Technique: LORT saline and LORT air        Needle Type: Touhy needle       Needle Gauge: 17.        Needle Length (Inches): 3.5        Catheter: 19 G.         Catheter threaded easily.        # of attempts: 1 and  # of redirects:     Assessment/Narrative         Paresthesias: No.      Test dose of 3 mL lidocaine 1.5% w/ 1:200,000 epinephrine at.         Test dose negative, 3 minutes after injection, for signs of intravascular, subdural, or intrathecal injection.       Insertion/Infusion Method: LORT saline and LORT air       Aspiration negative for Heme or CSF via Epidural Catheter.    Comments:  Pre-procedure time out completed. Patient in sitting position, the lumbar spine was prepped and draped in sterile fashion. The L2/L3 interspace was identified and local anesthetic was injected for local skin infiltration. A 17 G touhy needle was advanced to the epidural space which was confirmed with the loss of resistance technique at 4.5 cm. A catheter was then advanced easily into the epidural space. The catheter was left at 8.5 cm at the skin. Negative aspiration of blood and CSF was confirmed. A test dose of 1.5% lidocaine with 1:200,000 epinephrine was injected through the catheter and was negative for  intravascular injection. The site was covered with sterile tegaderm and the catheter was secured with tape.

## 2021-04-13 NOTE — H&P
[facility]    History and Physical  Obstetrics and Gynecology     Date of Admission:  2021    Assessment & Plan   Amina Aguayo is a 30 year old female who presents with contractions  ASSESSMENT:   IUP @ 38w3d in early labor.  NST reactive.  Category  I    PLAN:   Admit - see IP orders  Prophylactic antibiotic for + GBS status  Anticipate successful TOLAC    Sobia Leigh    History of Present Illness   Amina Aguayo is a 30 year old female  38w3d  Estimated Date of Delivery: 21 is calculated from Patient's last menstrual period was 2020. [facility]  in early labor.    PRENATAL COURSE  Prenatal course was   complicated by    Patient Active Problem List    Diagnosis Date Noted     Indication for care in labor or delivery 2021     Priority: Medium     Pregnancy, supervision, high-risk 2020     Priority: Medium     SALONI 2021  Hus: Yuriy.   innatal: Declined AFP neg  hx    FLU:  TDAP:   1 Hr GCT/Hgb: 90,11.7       Maternal care for scar from previous  delivery, unspecified prior  delivery type 2019     Priority: Medium     IMO Regulatory Load OCT 2020       Non-reassuring electronic fetal monitoring tracing 2019     Priority: Medium     , delivered 2019     Priority: Medium     NST (non-stress test) reactive 2018     Priority: Medium     Indication for care in labor and delivery, antepartum 2018     Priority: Medium     Previous  delivery, antepartum 2018     Priority: Medium     High-risk pregnancy, third trimester 2018     Priority: Medium     Subchorionic hemorrhage of placenta in first trimester, single or unspecified fetus 2018     Priority: Medium     History of colposcopy 05/15/2018     Priority: Medium     [place] was done in  with a result of KELLY I. No results to review.   18: NIL pap, Neg HR HPV result. Plan pap in 1  year per provider.  19 Patient is lost to pap tracking follow-up.          Generalized anxiety disorder 2018     Priority: Medium         Recent Labs   Lab Test 21  2245 10/15/20  1109   ABO PENDING AB   RH  --  Pos   AS PENDING Neg     Rhogam not indicated   Recent Labs   Lab Test 21  1645 10/15/20  1109   HEPBANG  --  Nonreactive   HIAGAB  --  Nonreactive   GBS Positive*  --    RUQIGG  --  10       Past Medical History    I have reviewed this patient's medical history and updated it with pertinent information if needed.   Past Medical History:   Diagnosis Date     Anorexia      Bulimia      Depressive disorder     PP anxiety with last baby; was on Zoloft       Past Surgical History   I have reviewed this patient's surgical history and updated it with pertinent information if needed.  Past Surgical History:   Procedure Laterality Date     APPENDECTOMY        SECTION       ORTHOPEDIC SURGERY      wrist surgey       Prior to Admission Medications   Prior to Admission Medications   Prescriptions Last Dose Informant Patient Reported? Taking?   Prenatal Vit-Fe Fumarate-FA (PNV PRENATAL PLUS MULTIVITAMIN) 27-1 MG TABS per tablet Past Month at Unknown time  Yes Yes   Sig: Take 1 tablet by mouth daily   doxylamine (UNISOM) 25 MG TABS tablet Past Month at Unknown time  Yes Yes   Sig: Take 25 mg by mouth At Bedtime      Facility-Administered Medications: None     Allergies   No Known Allergies    Social History   I have reviewed this patient's social history and updated it with pertinent information if needed. Amina Aguayo  reports that she quit smoking about 7 years ago. She has never used smokeless tobacco. She reports that she does not drink alcohol or use drugs.    Family History   I have reviewed this patient's family history and updated it with pertinent information if needed.   Family History   Problem Relation Age of Onset     Depression Mother      Depression Maternal  Grandmother      Dementia Maternal Grandmother      Depression Sister      No Known Problems Father      No Known Problems Brother      No Known Problems Maternal Grandfather      No Known Problems Paternal Grandmother      No Known Problems Other        Immunization History   Immunizations are up to date    Physical Exam   Temp: 99  F (37.2  C) Temp src: Temporal BP: 111/65 Pulse: 84   Resp: 16        Vital Signs with Ranges  Temp:  [99  F (37.2  C)] 99  F (37.2  C)  Pulse:  [84] 84  Resp:  [16] 16  BP: (100-111)/(60-65) 111/65    Abdomen: gravid, single vertex fetus, non-tender, EFW 7 lbs   Cervical Exam per nursin/ / Mid/ soft/ -2     Fetal Heart Tones: 145 baseline, moderate variablility, + accels, no decels and Category I  TOCO:   frequency q 4-5 minutes    Constitutional: healthy, alert and active   Respiratory: No increased work of breathing, good air exchange, clear to auscultation bilaterally, no crackles or wheezing  Cardiovascular: Normal apical impulse, regular rate and rhythm, normal S1 and S2, no S3 or S4, and no murmur noted  Skin/Extremites: normal skin color, texture, turgor  Neurologic: Awake, alert, oriented to name, place and time.    Neuropsychiatric: General: normal, calm and normal eye contact

## 2021-04-13 NOTE — PLAN OF CARE
Data: Amina Aguayo transferred to room 422 via wheelchair at 1200. Baby transferred via parent's arms.  Action: Receiving unit notified of transfer: Yes. Patient and family notified of room change. Report given to CHASE Elizondo & CHASE Zaldivar at 1200. Belongings sent to receiving unit. Accompanied by Registered Nurse. Oriented patient to surroundings. Call light within reach. ID bands double-checked with receiving RN.  Response: Patient tolerated transfer and is stable.

## 2021-04-13 NOTE — PROGRESS NOTES
"Labor Progress Note    S: Patient is comfortable with epidural.  She has no complaints    O:   Vitals:    04/13/21 0400 04/13/21 0430 04/13/21 0500 04/13/21 0530   BP: 103/52 94/55 99/67 98/56   Pulse:       Resp:       Temp: 97.9  F (36.6  C)      TempSrc: Temporal      SpO2: 96% 95% 95% 96%   Weight:       Height:         BP 98/56   Pulse 84   Temp 97.9  F (36.6  C) (Temporal)   Resp 16   Ht 1.676 m (5' 6\")   Wt 69.9 kg (154 lb)   LMP 07/17/2020   SpO2 96%   BMI 24.86 kg/m      Gen: AOx3, NAD    SVE: 6-7/80-3. Palpates occiput posterior    FHT: 130, moderate variability.  Cat 1  [place]: q5-8    Pitocin: none    A/P: Cont. Expectant management  -Cont PCN for GBS positive status.      Sobia Leigh MD  April 13, 2021  6:06 AM  "

## 2021-04-13 NOTE — L&D DELIVERY NOTE
OB Vaginal Delivery Note    Amina Aguayo MRN# [id number]   Age: 30 year old YOB: 1990       GA: 38w4d  GP:   Labor Complications: None   EBL:   mL  Delivery QBL: 911 mL  Delivery Type: , Spontaneous   ROM to Delivery Time: (Delivered) Hours: 1 Minutes: 19  [place] Weight: 3.21 kg (7 lb 1.2 oz)    1 Minute 5 Minute 10 Minute   Apgar Totals: 8   9        MARTA ARANDA     Delivery Details:  Amina Aguayo, a 30 year old  female delivered a viable female infant with apgars of 8  and 9 .  Weight 7 lbs 1.2 ounces. She was admitted in active labor overnight and started on penicillin for being GBS positive. She received 3 doses. Amniotomy and augmentation with oxytocin was performed this AM upon assuming cares of patient. Patient was fully dilated and pushing after 8  hours 14  minutes in active labor. Delivery was via , spontaneous  to a sterile field under epidural  anesthesia. Infant delivered in vertex  right  occiput  anterior  position. Anterior and posterior shoulders delivered without difficulty. The cord was clamped, cut twice and 3 vessels  were noted. Cord blood was obtained in routine fashion with the following disposition: lab .  After delivery of the infant there was brisk bleeding noted, therefore delayed clamping was not completed for one full minute. After delivery of the placenta the bleeding resolved. She was treated with manual massage and check of her lower uterine segment. She was also given IM methergine X 1 and 1g of TXA. There was also active management of the third stage of labor.     Cord complications: nuchal   Placenta delivered at 2021  9:12 AM . Placental disposition was Hospital disposal . Fundal massage performed and fundus found to be firm.     Episiotomy: none    Perineum, vagina, cervix were inspected, and the following lacerations were noted:   Perineal lacerations: 2nd                Any lacerations were repaired in the  usual fashion using 3-0 vicryl    Excellent hemostasis was noted. Needle count correct. Infant and patient in delivery room in good and stable condition.        Carolyn Aguayo [[id number]]    Labor Event Times    Labor onset date: 21 Onset time: 12:30 AM   Dilation complete date: 21 Complete time:  8:44 AM   Start pushing date/time: 2021 0906      Labor Length    1st Stage (hrs): 8 (min): 14   2nd Stage (hrs): 0 (min): 25   3rd Stage (hrs): 0 (min): 3      Labor Events     labor?: No   steroids: None  Labor Type: Spontaneous, Augmentation, AROM  Predominate monitoring during 1st stage: continuous electronic fetal monitoring     Antibiotics received during labor?: Yes  Reason for Antibiotics: GBS  Antibiotics received for GBS: Penicillin  Antibiotics Given (GBS): Greater than 4 hours prior to delivery     Rupture identifier: Sac 1  Rupture date/time: 21 0750   Rupture type: Artificial Rupture of Membranes  Fluid color: Clear  Fluid odor: Normal     Augmentation: Oxytocin, AROM  Indications for augmentation: Ineffective Contraction Pattern     Delivery/Placenta Date and Time    Delivery Date: 21 Delivery Time:  9:09 AM   Placenta Date/Time: 2021  9:12 AM  Oxytocin given at the time of delivery: after delivery of baby  Delivering clinician: Bita Marcelino MD   Other personnel present at delivery:  Provider Role   Bettina Mistry RN          Vaginal Counts     Initial count performed by 2 team members:  Two Team Members   stephenie marcelino       [place] Suture Needles Sponges (RETIRED) Instruments   Initial counts       Added to count       Relief counts       Final counts             Placed during labor Accounted for at the end of labor   FSE Yes Yes   IUPC No NA   Cervadil No NA              Final count performed by 2 team members:  Two Team Members   Bita Marcelino MD, RN      Final count correct?: Yes     Apgars   "  Living status: Living   1 Minute 5 Minute 10 Minute 15 Minute 20 Minute   Skin color: 0  1       Heart rate: 2  2       Reflex irritability: 2  2       Muscle tone: 2  2       Respiratory effort: 2  2       Total: 8  9       Apgars assigned by: MANOJ     Cord    Vessels: 3 Vessels    Cord Complications: Nuchal   Nuchal Intervention: reduced         Nuchal cord description: loose nuchal cord         Cord Blood Disposition: Lab    Gases Sent?: No    Delayed cord clamping?: Yes    Cord Clamping Delay (seconds): 31-60 seconds    Stem cell collection?: No       [place] Resuscitation    Methods: None      Measurements    Weight: 7 lb 1.2 oz Length: 1' 7.5\"   Head circumference: 34.5 cm    Birth Comments: nuchal cord x1     Skin to Skin and Feeding Plan    Skin to skin initiation date/time: 1841    Skin to skin with: Mother  Skin to skin end date/time:        Labor Events and Shoulder Dystocia    Fetal Tracing Prior to Delivery: Category 1  Shoulder dystocia present?: Neg     Delivery (Maternal) (Provider to Complete) ([id number])    Episiotomy: None  Perineal lacerations: 2nd Repaired?: Yes   Repair suture: 3-0 Vicryl  Genital tract inspection done: Pos     Blood Loss  Mother: Amina Aguayo R #[id number]   Start of Mother's Information    IO Blood Loss  21 0030 - 21 1256    Delivery QBL (mL) Hospital Encounter 911 mL    Total  911 mL         End of Mother's Information  Mother: Amina Aguayo R #[id number]          Delivery - Provider to Complete ([id number])    Delivering clinician: Bita Marcelino MD  Attempted Delivery Types (Choose all that apply): Vaginal Birth After   Delivery Type (Choose the 1 that will go to the Birth History): , Spontaneous                   Other personnel:  Provider Role   Bettina Mistry RN                 Placenta    Date/Time: 2021  9:12 AM  Removal: Spontaneous  Disposition: Hospital disposal           Anesthesia  "   Method: Epidural  Cervical dilation at placement: 4-7                Presentation and Position    Presentation: Vertex    Position: Right Occiput Anterior                 Bita Marcelino MD

## 2021-04-13 NOTE — PLAN OF CARE
Pt presents with fever post delivery. See flowsheet for details. Tylenol and ibuprofen given. Dr Marcelino updated. Ancef 2g x3 ordered. First dose administered. Pt transferred to room 422. Report given to CHASE Elizondo & Kayley STONE

## 2021-04-13 NOTE — PLAN OF CARE
Patient's temperature has returned to normal per flow sheet.  Patient is up out of bed independently and using the bathroom.  Patient states she is feeling better from earlier today.  Fundus firm at U with scant flow.  RN will continue to monitor.

## 2021-04-13 NOTE — PLAN OF CARE
Pt admitted to [facility], ambulatory per services of Dr Leigh for evaluation of labor.  Pt states that she had her membranes stripped this morning and has been having contractions since and in the last few hours they have gotten stronger. Pt denies bleeding or loss of fluid.  Discussed plan of care including EFM with NST, routine VS, and SVE.  Pt agreeable.  EFM applied and admission assessment completed.  FHT's category 1, contractions noted q 6 minutes, pt appears relaxed and talking through contractions. SVE 4-5/70/-2. Dr Leigh in department, updated her with NST, contractions and SVE. Plan for patient to walk for one hour and recheck cervix. Pt agreeable to plan. Pt off monitor at 2100, walked for 1 hour, back on monitor at 2200. SVE 5/80/-2. Plan to admit pt to L&D. Dr Vazquez updated. Received intrapartum orders. Updated pt with plan. Obtained covid swab. Placed PIV. Transferred pt to room 220. Report given to Isabel Olvera RN.

## 2021-04-14 LAB
BASOPHILS # BLD AUTO: 0 10E9/L (ref 0–0.2)
BASOPHILS NFR BLD AUTO: 0.3 %
DIFFERENTIAL METHOD BLD: ABNORMAL
EOSINOPHIL # BLD AUTO: 0 10E9/L (ref 0–0.7)
EOSINOPHIL NFR BLD AUTO: 0.3 %
ERYTHROCYTE [DISTWIDTH] IN BLOOD BY AUTOMATED COUNT: 13.5 % (ref 10–15)
HCT VFR BLD AUTO: 36.7 % (ref 35–47)
HGB BLD-MCNC: 11.7 G/DL (ref 11.7–15.7)
IMM GRANULOCYTES # BLD: 0.1 10E9/L (ref 0–0.4)
IMM GRANULOCYTES NFR BLD: 0.6 %
LYMPHOCYTES # BLD AUTO: 0.9 10E9/L (ref 0.8–5.3)
LYMPHOCYTES NFR BLD AUTO: 7.1 %
MCH RBC QN AUTO: 27.9 PG (ref 26.5–33)
MCHC RBC AUTO-ENTMCNC: 31.9 G/DL (ref 31.5–36.5)
MCV RBC AUTO: 88 FL (ref 78–100)
MONOCYTES # BLD AUTO: 0.7 10E9/L (ref 0–1.3)
MONOCYTES NFR BLD AUTO: 5.2 %
NEUTROPHILS # BLD AUTO: 11 10E9/L (ref 1.6–8.3)
NEUTROPHILS NFR BLD AUTO: 86.5 %
NRBC # BLD AUTO: 0 10*3/UL
NRBC BLD AUTO-RTO: 0 /100
PLATELET # BLD AUTO: 174 10E9/L (ref 150–450)
RBC # BLD AUTO: 4.19 10E12/L (ref 3.8–5.2)
WBC # BLD AUTO: 12.7 10E9/L (ref 4–11)

## 2021-04-14 PROCEDURE — 250N000013 HC RX MED GY IP 250 OP 250 PS 637: Performed by: OBSTETRICS & GYNECOLOGY

## 2021-04-14 PROCEDURE — 36415 COLL VENOUS BLD VENIPUNCTURE: CPT | Performed by: OBSTETRICS & GYNECOLOGY

## 2021-04-14 PROCEDURE — 85025 COMPLETE CBC W/AUTO DIFF WBC: CPT | Performed by: OBSTETRICS & GYNECOLOGY

## 2021-04-14 PROCEDURE — 250N000011 HC RX IP 250 OP 636: Performed by: OBSTETRICS & GYNECOLOGY

## 2021-04-14 PROCEDURE — 120N000012 HC R&B POSTPARTUM

## 2021-04-14 RX ADMIN — ACETAMINOPHEN 650 MG: 325 TABLET, FILM COATED ORAL at 23:37

## 2021-04-14 RX ADMIN — CEFAZOLIN SODIUM 2 G: 2 INJECTION, SOLUTION INTRAVENOUS at 03:40

## 2021-04-14 RX ADMIN — ACETAMINOPHEN 650 MG: 325 TABLET, FILM COATED ORAL at 17:40

## 2021-04-14 RX ADMIN — SENNOSIDES AND DOCUSATE SODIUM 1 TABLET: 8.6; 5 TABLET ORAL at 19:48

## 2021-04-14 RX ADMIN — IBUPROFEN 800 MG: 400 TABLET ORAL at 17:40

## 2021-04-14 RX ADMIN — IBUPROFEN 800 MG: 400 TABLET ORAL at 05:34

## 2021-04-14 RX ADMIN — IBUPROFEN 800 MG: 400 TABLET ORAL at 23:38

## 2021-04-14 RX ADMIN — IBUPROFEN 800 MG: 400 TABLET ORAL at 11:35

## 2021-04-14 RX ADMIN — SENNOSIDES AND DOCUSATE SODIUM 1 TABLET: 8.6; 5 TABLET ORAL at 08:57

## 2021-04-14 RX ADMIN — ACETAMINOPHEN 650 MG: 325 TABLET, FILM COATED ORAL at 11:35

## 2021-04-14 RX ADMIN — ACETAMINOPHEN 650 MG: 325 TABLET, FILM COATED ORAL at 05:34

## 2021-04-14 NOTE — ANESTHESIA POSTPROCEDURE EVALUATION
Patient: Amina Aguayo    * No procedures listed *    Diagnosis:* No pre-op diagnosis entered *  Diagnosis Additional Information: No value filed.    Anesthesia Type:  No value filed.    Note:  Disposition: Inpatient   Postop Pain Control: Uneventful            Sign Out: Well controlled pain   PONV: No   Neuro/Psych: Uneventful            Sign Out: Acceptable/Baseline neuro status   Airway/Respiratory: Uneventful            Sign Out: Acceptable/Baseline resp. status   CV/Hemodynamics: Uneventful            Sign Out: Acceptable CV status   Other NRE: NONE   DID A NON-ROUTINE EVENT OCCUR? No    Event details/Postop Comments:  Epidural has worn off without complications         Last vitals:  Vitals:    04/13/21 2236 04/14/21 0915 04/14/21 1712   BP: 90/51 99/53 104/58   Pulse: 62 59 59   Resp: 16 16 16   Temp: 36.4  C (97.5  F) 36.6  C (97.9  F) 36.7  C (98  F)   SpO2:          Last vitals prior to Anesthesia Care Transfer:      Electronically Signed By: Myron Doty MD  April 14, 2021  5:48 PM

## 2021-04-14 NOTE — PROGRESS NOTES
[facility] Obstetrics Post-Partum Progress Note          Assessment and Plan:    Assessment:   Post-partum day #1  Vaginal delivery after   L&D complications: Post operative high fever  Large QBL with no anemia postpartum      Doing well.  Pain well-controlled.  No more fevers after Ancef X 3 doses.  Post op hemoglobin is wnl      Plan:   Anticipate discharge tomorrow, will monitor off IV antibiotics.           Interval History:   Doing well. Breastfeeding. No other concerns. Bonding well with infant. Feels better than yesterday          Significant Problems:    Unexplained post-operative fever          Review of Systems:    The Review of Systems is negative other than noted in the HPI          Medications:   -          Physical Exam:     Patient Vitals for the past 24 hrs:   BP Temp Temp src Pulse Resp SpO2   21 2236 90/51 97.5  F (36.4  C) Oral 62 16 --   21 1600 99/55 98.9  F (37.2  C) Oral 81 16 --   21 1338 -- 98.2  F (36.8  C) Oral -- -- --   21 1200 108/56 101  F (38.3  C) Oral 90 16 --   21 1130 101/53 -- -- -- -- --   21 1100 -- 101.6  F (38.7  C) Temporal -- -- 98 %   21 1045 104/67 -- -- -- -- 98 %   21 1030 102/58 -- -- -- -- 98 %   21 1015 98/55 101.4  F (38.6  C) Temporal -- -- 100 %   21 1000 118/65 -- -- -- -- 100 %   21 0945 94/59 100.4  F (38  C) Temporal -- -- 100 %   21 0930 103/51 -- -- -- -- 99 %   21 0909 101/57 -- -- -- -- 100 %     Uterine fundus is firm, non-tender and at below the level of the umbilicus          Data:     Hemoglobin   Date Value Ref Range Status   2021 11.7 11.7 - 15.7 g/dL Final   2021 10.8 (L) 11.7 - 15.7 g/dL Final   2021 11.7 11.7 - 15.7 g/dL Final   10/15/2020 14.2 11.7 - 15.7 g/dL Final   2019 14.0 11.7 - 15.7 g/dL Final     -    Bita Marcelino MD

## 2021-04-14 NOTE — PLAN OF CARE
VSS. Voiding adequately on own. Scant vag bleeding. Pain controlled w/ ibuprofen and tylenol. Breast feeding well when infant awake, infant sleepy and spitty for some feeds. Antibiotics complete. Mornings labs ordered.

## 2021-04-14 NOTE — PLAN OF CARE
VSS (afebrile) and the pt reports pain adequately with Tylenol/Ibuprofen.  She's ambulating independently in the room and continues working on breastfeeding.  She required a partial staff assist to verify a correct latch and she was encouraged to ensure her daughter keeps her bottom lip rolled out and down.  She was also given hydrogels for soreness and on demand/cluster feeding was encouraged.  Discharge expected in the AM

## 2021-04-14 NOTE — LACTATION NOTE
"Routine check in with Amina, CHIRAG Yan, and baby Janel. This is Amina's 4th baby and although she admits breastfeeding wasn't necessarily \"easy\" with her first three, she knows what to expect/what feels right, and says this baby girl has made breastfeeding pretty easy so far. Amina breastfeeds infant at time of visit, Amina holds infant in cross cradle and infant latches well after a couple of attempts. Amina had questions about whether or not infant was latched deep enough. LC observes infant's lower jawline to be dropped (double chin visible) and reminded Amina to quickly bring infant into breast when she opens her mouth wide to take advantage of her mouth when it is at it's widest point. Nutritive suckling pattern observed, and Amina states latch feels comfortable.    CLATYON observes veins popping in Amina's chest, Amina feels like her milk is beginning to come in! Offered to provide any additional assistance as needed.    Teri Martinez RN IBCLC  "

## 2021-04-15 VITALS
HEART RATE: 58 BPM | OXYGEN SATURATION: 98 % | BODY MASS INDEX: 24.75 KG/M2 | HEIGHT: 66 IN | WEIGHT: 154 LBS | SYSTOLIC BLOOD PRESSURE: 94 MMHG | TEMPERATURE: 97.6 F | RESPIRATION RATE: 16 BRPM | DIASTOLIC BLOOD PRESSURE: 60 MMHG

## 2021-04-15 PROCEDURE — 250N000013 HC RX MED GY IP 250 OP 250 PS 637: Performed by: OBSTETRICS & GYNECOLOGY

## 2021-04-15 RX ORDER — ACETAMINOPHEN 325 MG/1
650 TABLET ORAL EVERY 4 HOURS PRN
COMMUNITY
Start: 2021-04-15 | End: 2021-06-07

## 2021-04-15 RX ORDER — IBUPROFEN 800 MG/1
800 TABLET, FILM COATED ORAL EVERY 6 HOURS PRN
COMMUNITY
Start: 2021-04-15 | End: 2021-06-07

## 2021-04-15 RX ADMIN — ACETAMINOPHEN 650 MG: 325 TABLET, FILM COATED ORAL at 05:50

## 2021-04-15 RX ADMIN — SENNOSIDES AND DOCUSATE SODIUM 1 TABLET: 8.6; 5 TABLET ORAL at 09:37

## 2021-04-15 RX ADMIN — IBUPROFEN 800 MG: 400 TABLET ORAL at 05:50

## 2021-04-15 NOTE — PROGRESS NOTES
"[facility] Obstetrics Post-Partum Progress Note          Assessment and Plan:    Assessment:   Post-partum day #2  Vaginal delivery after   L&D complications: Postpartum fever      Doing well.  Afebrile for 48 hours      Plan:   Discharge later today  Follow up in 6 weeks           Interval History:   Doing well. Ready for discharge          Significant Problems:    Postpartum Fever s/p Ancef X 3 doses          Review of Systems:    The Review of Systems is negative other than noted in the HPI          Medications:     Current Facility-Administered Medications   Medication     acetaminophen (TYLENOL) tablet 650 mg     bisacodyl (DULCOLAX) Suppository 10 mg     calcium carbonate (TUMS) chewable tablet 1,000 mg     carboprost (HEMABATE) injection 250 mcg     carboprost (HEMABATE) injection 250 mcg     ePHEDrine injection 5 mg     fentaNYL (PF) (SUBLIMAZE) injection 100 mcg     fentaNYL (SUBLIMAZE) 2 mcg/mL, bupivacaine (MARCAINE) 0.125% in NS premix for PCEA     hydrocortisone 2.5 % cream     ibuprofen (ADVIL/MOTRIN) tablet 800 mg     ibuprofen (ADVIL/MOTRIN) tablet 800 mg     IF subcutaneous (SQ) Unfractionated heparin (UFH) ordered for thromboprophylaxis    Or     IF intravenous (IV) Unfractionated heparin (UFH) ordered    Or     IF LOW-dose Low molecular weight heparin (LMWH) thromboprophylaxis ordered    Or     IF HIGHER-dose Low molecular weight heparin (LMWH) thromboprophylaxis ordered     lactated ringers BOLUS 1,000 mL    Or     lactated ringers BOLUS 500 mL     lactated ringers BOLUS 1,000 mL     lactated ringers BOLUS 250 mL     lanolin cream     magnesium hydroxide (MILK OF MAGNESIA) suspension 30 mL     medication instruction     Medication Instructions: misoprostol (CYTOTEC)- Nurse to discuss ordering with provider, if needed. Ordered via \"OB misoprostol (CYTOTEC) Postpartum Hemorrhage PANEL\"     methylergonovine (METHERGINE) injection 200 mcg     misoprostol (CYTOTEC) tablet 800 " mcg     nalbuphine (NUBAIN) injection 2.5-5 mg     naloxone (NARCAN) injection 0.2 mg    Or     naloxone (NARCAN) injection 0.4 mg    Or     naloxone (NARCAN) injection 0.2 mg    Or     naloxone (NARCAN) injection 0.4 mg     No MMR Needed - Assessment: Patient does not need MMR vaccine     NO Rho (D) immune globulin (RhoGam) needed - mother Rh POSITIVE     No Tdap Needed - Assessment: Patient does not need Tdap vaccine     ondansetron (ZOFRAN-ODT) ODT tab 4 mg    Or     ondansetron (ZOFRAN) injection 4 mg     Opioid plan postpartum - medication instruction     oxyCODONE (ROXICODONE) tablet 5 mg     oxyCODONE-acetaminophen (PERCOCET) 5-325 MG per tablet 1 tablet     oxytocin (PITOCIN) 30 units in 500 mL 0.9% NaCl infusion     oxytocin (PITOCIN) 30 units in 500 mL 0.9% NaCl infusion     oxytocin (PITOCIN) injection 10 Units     ROPivacaine (NAROPIN) injection 10 mL     senna-docusate (SENOKOT-S/PERICOLACE) 8.6-50 MG per tablet 1 tablet    Or     senna-docusate (SENOKOT-S/PERICOLACE) 8.6-50 MG per tablet 2 tablet     sodium phosphate (FLEET ENEMA) 1 enema     tranexamic acid 1 g in 100 mL 0.7% NaCl IV bag (premix)             Physical Exam:     Patient Vitals for the past 72 hrs:   BP Temp Temp src Pulse Resp SpO2 Height Weight   04/15/21 0007 96/55 97.7  F (36.5  C) Oral (!) 48 16 -- -- --   04/14/21 1712 104/58 98  F (36.7  C) Oral 59 16 -- -- --   04/14/21 0915 99/53 97.9  F (36.6  C) Oral 59 16 -- -- --   04/13/21 2236 90/51 97.5  F (36.4  C) Oral 62 16 -- -- --   04/13/21 1600 99/55 98.9  F (37.2  C) Oral 81 16 -- -- --   04/13/21 1338 -- 98.2  F (36.8  C) Oral -- -- -- -- --   04/13/21 1200 108/56 101  F (38.3  C) Oral 90 16 -- -- --   04/13/21 1130 101/53 -- -- -- -- -- -- --   04/13/21 1100 -- 101.6  F (38.7  C) Temporal -- -- 98 % -- --   04/13/21 1045 104/67 -- -- -- -- 98 % -- --   04/13/21 1030 102/58 -- -- -- -- 98 % -- --   04/13/21 1015 98/55 101.4  F (38.6  C) Temporal -- -- 100 % -- --   04/13/21 1000  118/65 -- -- -- -- 100 % -- --   04/13/21 0945 94/59 100.4  F (38  C) Temporal -- -- 100 % -- --   04/13/21 0930 103/51 -- -- -- -- 99 % -- --   04/13/21 0909 101/57 -- -- -- -- 100 % -- --   04/13/21 0900 101/57 -- -- -- -- 100 % -- --   04/13/21 0843 -- -- -- -- -- 100 % -- --   04/13/21 0835 95/53 -- -- -- -- 99 % -- --   04/13/21 0810 -- -- -- -- -- 100 % -- --   04/13/21 0800 (!) 82/43 -- -- -- -- 99 % -- --   04/13/21 0750 91/50 -- -- 92 16 100 % -- --   04/13/21 0730 -- 98.5  F (36.9  C) Temporal -- -- -- -- --   04/13/21 0700 96/50 -- -- -- -- 100 % -- --   04/13/21 0600 99/54 98.9  F (37.2  C) Temporal -- -- 96 % -- --   04/13/21 0530 98/56 -- -- -- -- 96 % -- --   04/13/21 0500 99/67 -- -- -- -- 95 % -- --   04/13/21 0430 94/55 -- -- -- -- 95 % -- --   04/13/21 0400 103/52 97.9  F (36.6  C) Temporal -- -- 96 % -- --   04/13/21 0330 95/52 -- -- -- -- 95 % -- --   04/13/21 0300 (!) 84/49 -- -- -- -- 95 % -- --   04/13/21 0225 96/52 -- -- -- -- 94 % -- --   04/13/21 0157 95/53 -- -- -- -- -- -- --   04/13/21 0154 95/54 -- -- -- -- 94 % -- --   04/13/21 0149 95/56 -- -- -- -- 95 % -- --   04/13/21 0144 94/51 -- -- -- -- 94 % -- --   04/13/21 0137 (!) 89/50 -- -- -- -- 94 % -- --   04/13/21 0134 93/52 -- -- -- -- 97 % -- --   04/13/21 0133 (!) 87/49 -- -- -- -- -- -- --   04/13/21 0131 100/54 -- -- -- -- -- -- --   04/13/21 0129 101/59 -- -- -- -- 97 % -- --   04/13/21 0127 112/55 -- -- -- -- -- -- --   04/13/21 0125 94/52 -- -- -- -- 98 % -- --   04/13/21 0123 101/54 -- -- -- -- -- -- --   04/13/21 0121 104/57 -- -- -- -- -- -- --   04/13/21 0119 108/55 -- -- -- -- 96 % -- --   04/13/21 0117 104/58 -- -- -- -- -- -- --   04/13/21 0115 100/56 -- -- -- -- 97 % -- --   04/13/21 0113 99/54 -- -- -- -- -- -- --   04/13/21 0111 99/56 -- -- -- -- -- -- --   04/13/21 0109 100/58 -- -- -- -- 97 % -- --   04/13/21 0107 100/59 -- -- -- -- -- -- --   04/13/21 0105 98/57 -- -- -- -- -- -- --   04/13/21 0103 94/55 -- -- --  "-- 97 % -- --   04/13/21 0101 93/51 -- -- -- -- -- -- --   04/13/21 0059 99/56 -- -- -- -- 96 % -- --   04/13/21 0055 100/52 -- -- -- -- 98 % -- --   04/12/21 2347 110/58 98.4  F (36.9  C) Temporal -- -- -- -- --   04/12/21 2042 111/65 99  F (37.2  C) Temporal 84 16 -- 1.676 m (5' 6\") 69.9 kg (154 lb)     Uterine fundus is firm, non-tender and at below the level of the umbilicus          Data:     Hemoglobin   Date Value Ref Range Status   04/14/2021 11.7 11.7 - 15.7 g/dL Final   04/13/2021 10.8 (L) 11.7 - 15.7 g/dL Final   01/28/2021 11.7 11.7 - 15.7 g/dL Final   10/15/2020 14.2 11.7 - 15.7 g/dL Final   08/02/2019 14.0 11.7 - 15.7 g/dL Final     -    Bita Marcelino MD     "

## 2021-04-15 NOTE — PLAN OF CARE
VSS. Voiding adequately on own. Scant vag bleeding. Pain controlled w/ ibuprofen and tylenol. Breast feeding well, pumped x1 overnight.

## 2021-04-15 NOTE — PLAN OF CARE
Pt VSS, fundus firm, scant flow. Pain controlled with Ibu/Tyl, senna for stool softener. Pt ambulating independently and independent with infant cares. Breastfeeding going well, milk coming in. Discharging home today with baby.

## 2021-04-15 NOTE — PLAN OF CARE
Patient meeting expected goals for this shift.  Using ibuprofen & tylenol for pain/comfort.  Independent in all self and  cares.  Working on breastfeeding .   Positive bonding behaviors observed.  Continue to monitor and notify MD as needed.

## 2021-04-15 NOTE — LACTATION NOTE
Follow up Lactation visit with Amina & baby girl. Getting ready for discharge. Amina reports feeding is going well. She feels her milk is starting to come in!  Discussed cluster feeding, what it is and when to expect it, The Second Night, satiety cues, feeding cues, and reviewed Feeding Log for home use.     Reviewed milk supply and engorgement. Reviewed typical timeline of milk supply initiation and progression over first 3-5 days postpartum. Discussed comfort measures for engorgement, plugged duct treatment, and warning signs of breast infection. Discussed using breast pump in preparation for return to work. Discussed milk storage, introducing a bottle, and general recommendation to wait to start pumping for milk storage or bottle feeding in preparation for return to work until breastfeeding is well established in 3-4 weeks. Exceptions: if feeding is poor or baby needs to supplement for medical reasons.    Feeding plan: Recommend unlimited, frequent breast feedings: At least 8 - 12 times every 24 hours. Avoid pacifiers and supplementation with formula unless medically indicated. Encouraged use of feeding log and to record feedings, and void/stool patterns. Amina has a breast pump for home use. Follow up with Jimbo Snell. Reviewed outpatient lactation resources. Amina appreciative of Lactation visit and support.    Dayna Coronel, RN-C, IBCLC, MNN, PHN, BSN

## 2021-05-09 ENCOUNTER — HEALTH MAINTENANCE LETTER (OUTPATIENT)
Age: 31
End: 2021-05-09

## 2021-05-26 NOTE — PROGRESS NOTES
"  SUBJECTIVE:  Amina Aguayo,  is here for a postpartum visit.  She had a  on 21 delivering a healthy baby girl, named Janel weighing 7 lbs 1.2 oz at term.      HPI:  Amina is doing well overall despite family circumstances. She is open to talk therapy and appreciates a referral. Her sons are doing well with Janel. She is breastfeeding. She has never used anything for contraception and prefers to continue fertility monitoring.      Last PHQ-9 score on record=   PHQ-9 SCORE 2021   PHQ-9 Total Score MyChart -   PHQ-9 Total Score 1     JOHNNY-7 SCORE 9/10/2020 2020 2021   Total Score 0 (minimal anxiety) - -   Total Score 0 0 0       Pap:   Lab Results   Component Value Date    PAP NIL 2018       Delivery complications:  No  Breast feeding:  Yes, no issues. Doesn't supplement with formula.  Bladder problems:  No  Bowel problems/hemorrhoids:  No  Episiotomy/laceration/incision healed? 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    OBJECTIVE:  Vitals: /60   Pulse 68   Ht 1.676 m (5' 6\")   Wt 59.8 kg (131 lb 12.8 oz)   LMP 2020 (Approximate)   BMI 21.27 kg/m    BMI= Body mass index is 21.27 kg/m .  General - pleasant female in no acute distress.  Breast -  symmetric, no skin changes and no puckering  Abdomen - No incision  Pelvic - EG: normal adult female, BUS: within normal limits, Vagina: well rugated, no discharge, Cervix: no lesions or CMT, Uterus: firm, normal sized and nontender, retroverted in position. Adnexae: no masses or tenderness.  Rectovaginal - deferred.    ASSESSMENT:    ICD-10-CM    1. Routine postpartum follow-up  Z39.2    2. Screening for cervical cancer  Z12.4 Pap imaged thin layer screen with HPV - recommended age 30 - 65 years (select HPV " order below)     HPV High Risk Types DNA Cervical   3. Situational depression  F43.21 MENTAL HEALTH REFERRAL  - Adult; Outpatient Treatment; Individual/Couples/Family/Group Therapy/Health Psychology; [facility]: [facility] [phone]; We will contact you to schedule the appointment or please call with any questions       PLAN:  May resume normal activities without restrictions.  Pap smear was done today.    Full counseling was provided, and all questions were answered.   No postpartum depression but referral placed for situational support.  Pap smear performed today.     Bita Marcelino MD

## 2021-06-07 ENCOUNTER — PRENATAL OFFICE VISIT (OUTPATIENT)
Dept: OBGYN | Facility: CLINIC | Age: 31
End: 2021-06-07
Payer: COMMERCIAL

## 2021-06-07 VITALS
SYSTOLIC BLOOD PRESSURE: 112 MMHG | HEART RATE: 68 BPM | WEIGHT: 131.8 LBS | DIASTOLIC BLOOD PRESSURE: 60 MMHG | BODY MASS INDEX: 21.18 KG/M2 | HEIGHT: 66 IN

## 2021-06-07 DIAGNOSIS — Z12.4 SCREENING FOR CERVICAL CANCER: ICD-10-CM

## 2021-06-07 DIAGNOSIS — F43.21 SITUATIONAL DEPRESSION: ICD-10-CM

## 2021-06-07 PROBLEM — O09.90 PREGNANCY, SUPERVISION, HIGH-RISK: Status: RESOLVED | Noted: 2020-09-14 | Resolved: 2021-06-07

## 2021-06-07 PROCEDURE — G0145 SCR C/V CYTO,THINLAYER,RESCR: HCPCS | Performed by: OBSTETRICS & GYNECOLOGY

## 2021-06-07 PROCEDURE — 87624 HPV HI-RISK TYP POOLED RSLT: CPT | Performed by: OBSTETRICS & GYNECOLOGY

## 2021-06-07 PROCEDURE — 99207 PR POST PARTUM EXAM: CPT | Performed by: OBSTETRICS & GYNECOLOGY

## 2021-06-07 ASSESSMENT — ANXIETY QUESTIONNAIRES
5. BEING SO RESTLESS THAT IT IS HARD TO SIT STILL: NOT AT ALL
1. FEELING NERVOUS, ANXIOUS, OR ON EDGE: NOT AT ALL
GAD7 TOTAL SCORE: 0
IF YOU CHECKED OFF ANY PROBLEMS ON THIS QUESTIONNAIRE, HOW DIFFICULT HAVE THESE PROBLEMS MADE IT FOR YOU TO DO YOUR WORK, TAKE CARE OF THINGS AT HOME, OR GET ALONG WITH OTHER PEOPLE: NOT DIFFICULT AT ALL
6. BECOMING EASILY ANNOYED OR IRRITABLE: NOT AT ALL
3. WORRYING TOO MUCH ABOUT DIFFERENT THINGS: NOT AT ALL
7. FEELING AFRAID AS IF SOMETHING AWFUL MIGHT HAPPEN: NOT AT ALL
2. NOT BEING ABLE TO STOP OR CONTROL WORRYING: NOT AT ALL

## 2021-06-07 ASSESSMENT — PATIENT HEALTH QUESTIONNAIRE - PHQ9
SUM OF ALL RESPONSES TO PHQ QUESTIONS 1-9: 1
5. POOR APPETITE OR OVEREATING: NOT AT ALL

## 2021-06-07 ASSESSMENT — MIFFLIN-ST. JEOR: SCORE: 1334.59

## 2021-06-08 ASSESSMENT — ANXIETY QUESTIONNAIRES: GAD7 TOTAL SCORE: 0

## 2021-06-10 LAB
COPATH REPORT: NORMAL
PAP: NORMAL

## 2021-06-14 LAB
FINAL DIAGNOSIS: NORMAL
HPV HR 12 DNA CVX QL NAA+PROBE: NEGATIVE
HPV16 DNA SPEC QL NAA+PROBE: NEGATIVE
HPV18 DNA SPEC QL NAA+PROBE: NEGATIVE
SPECIMEN DESCRIPTION: NORMAL
SPECIMEN SOURCE CVX/VAG CYTO: NORMAL

## 2021-07-29 ENCOUNTER — TELEPHONE (OUTPATIENT)
Dept: OBGYN | Facility: CLINIC | Age: 31
End: 2021-07-29

## 2021-07-29 DIAGNOSIS — B37.2 YEAST INFECTION OF THE SKIN: Primary | ICD-10-CM

## 2021-07-29 RX ORDER — NYSTATIN 100000 U/G
OINTMENT TOPICAL 2 TIMES DAILY
Qty: 30 G | Refills: 0 | Status: SHIPPED | OUTPATIENT
Start: 2021-07-29 | End: 2021-08-05

## 2021-07-29 NOTE — TELEPHONE ENCOUNTER
Left msg rx has been sent, pt to call back if symptoms do not improve  Madelyn Hall RN on 7/29/2021 at 11:46 AM

## 2021-07-29 NOTE — TELEPHONE ENCOUNTER
Pt called earlier regarding possible yeast infection to nipple.   Attempted to call pt back, no answer, msg left  Madelyn Hall RN on 7/29/2021 at 11:15 AM      Baby has been diagnosed by peds with thrush  Amina does have cracked nipples, some bleeding.   Requesting medicaition  Discussed with consult with Dr. Marcelino-may want to send APNO cream and/or oral medication.   OV may be requested.   Will contact Amina with recommendations  Madelyn Hall RN on 7/29/2021 at 11:19 AM

## 2021-08-12 ENCOUNTER — TELEPHONE (OUTPATIENT)
Dept: OBGYN | Facility: CLINIC | Age: 31
End: 2021-08-12

## 2021-08-12 DIAGNOSIS — N64.52 NIPPLE DISCHARGE: Primary | ICD-10-CM

## 2021-08-12 DIAGNOSIS — N64.4 NIPPLE SORENESS: ICD-10-CM

## 2021-08-12 NOTE — TELEPHONE ENCOUNTER
Pt calling with continued nipple soreness - has used APNO cream in the past. She is using the nystatin but feels the APNO cream worked better to help heal.    APNO rx sent to [facility] in [facility] reviewed pharmacy, compounding process and instructions.    Pt verbalized understanding, in agreement with plan, and voiced no further questions.  Tish Devine RN on 8/12/2021 at 4:21 PM

## 2021-09-09 ENCOUNTER — TELEPHONE (OUTPATIENT)
Dept: OBGYN | Facility: CLINIC | Age: 31
End: 2021-09-09

## 2021-09-09 DIAGNOSIS — B37.0 THRUSH: Primary | ICD-10-CM

## 2021-09-09 DIAGNOSIS — B37.0 THRUSH: ICD-10-CM

## 2021-09-09 RX ORDER — FLUCONAZOLE 200 MG/1
TABLET ORAL
Qty: 16 TABLET | Refills: 0 | Status: SHIPPED | OUTPATIENT
Start: 2021-09-09 | End: 2022-09-13

## 2021-09-09 NOTE — TELEPHONE ENCOUNTER
Requested Prescriptions   Pending Prescriptions Disp Refills     APNO CREA ointment 40 g 0     Sig: Mupirocin (Bactroban) 2% Ointment (10gms), Nystatin 100,000 units/ml ointment (10gms), Betamethasone 0.1% ointment (10gms), Clortrimazole 1% (10gms). Apply a small amount after each feeding or pumping. Do not wipe it off before the next feeding or pumping, it is safe for both you & baby. Use it everyday until you are healed or become pain free.       There is no refill protocol information for this order        Last Written Prescription Date:  09/09/21  Last Fill Quantity: 40g,  # refills: 0   Last office visit: Visit date not found with prescribing provider:  Ryland   Future Office Visit:  None found    From Pharmacy:  We have a formula we make that is 2% clotrimazole and not 1%. Is that ok to do instead of the 1% written?

## 2021-09-09 NOTE — TELEPHONE ENCOUNTER
Daughter diagnosed for thrush again-peds started on oral Fluconazole today.    Amina requesting oral medication as she feels they keep passing it back and forth to each other and she is having the same nipple soreness as last time.   Would also like APNO cream if recommended by Dr. Ryland Hall, RN on 9/9/2021 at 8:20 AM

## 2021-09-10 ENCOUNTER — TELEPHONE (OUTPATIENT)
Dept: OBGYN | Facility: CLINIC | Age: 31
End: 2021-09-10

## 2021-09-10 NOTE — TELEPHONE ENCOUNTER
rec'd rejection from pharmacy for APNO cream. Informed patient of rejection and she will pay cash. Plans to pick it up today.

## 2021-10-24 ENCOUNTER — HEALTH MAINTENANCE LETTER (OUTPATIENT)
Age: 31
End: 2021-10-24

## 2022-02-08 NOTE — TELEPHONE ENCOUNTER
Called patient   States she received bill for $7000 for the [employer] Genetic testing that was done 6/18/2018  States she knew she was high risk so went through with the testing and now has this huge bill  Needs letter from PN stating why this test was needed    Huddled with PN who said [facility] has deal where they're patients get this testing done for $100  Was advised to call over to  [facility] ([phone])  Called them and  states this happens with every patient where they get a large $5000+ bill but pt needs to call [employer] rep Maloney at [phone] and she is the one who adjusts cost to $100    Called pt - gave her # for Amara  Told pt to call us back if issues or anything further needed regarding this  Kyra LUGO, RN    
I am a little confused about this message?  Who called?  Was it the patient or her insurance company?  Thanks  PN    
Reason for Call:  Form, our goal is to have forms completed with 72 hours, however, some forms may require a visit or additional information.    Type of letter, form or note:  medical    Who is the form from?: Insurance comp    Where did the form come from: Need Letter    What clinic location was the form placed at?: [facility]    Where the form was placed: Needs Dr. Reynolds to write    What number is listed as a contact on the form?:  [facility] [phone]/provider: [phone]       Additional comments: Pt would like to speak with Dr. Reynolds.  Needs a letter of Medical Necessity regarding genetic testing ordered during high risk pregnancy.  Insurance refusing to cover cost of genetic testing without approval from MD. Taylor to send [facility] message as well.     Call taken on 7/20/2018 at 3:14 PM by Kary Ramirez        
Awake/Alert

## 2022-07-31 ENCOUNTER — HEALTH MAINTENANCE LETTER (OUTPATIENT)
Age: 32
End: 2022-07-31

## 2022-08-16 ENCOUNTER — TELEPHONE (OUTPATIENT)
Dept: OBGYN | Facility: CLINIC | Age: 32
End: 2022-08-16

## 2022-08-16 DIAGNOSIS — Z13.88 SCREENING FOR LEAD EXPOSURE: Primary | ICD-10-CM

## 2022-08-16 NOTE — TELEPHONE ENCOUNTER
Pt's child tested at peds-has elevated lead level.    Consulted with Dr. Ryland gutierrez to order lab.  Madelyn Hall RN on 8/16/2022 at 3:53 PM

## 2022-08-17 ENCOUNTER — LAB (OUTPATIENT)
Dept: LAB | Facility: CLINIC | Age: 32
End: 2022-08-17
Payer: COMMERCIAL

## 2022-08-17 DIAGNOSIS — Z13.88 SCREENING FOR LEAD EXPOSURE: ICD-10-CM

## 2022-08-17 PROCEDURE — 99000 SPECIMEN HANDLING OFFICE-LAB: CPT

## 2022-08-17 PROCEDURE — 83655 ASSAY OF LEAD: CPT | Mod: 90

## 2022-08-17 PROCEDURE — 36415 COLL VENOUS BLD VENIPUNCTURE: CPT

## 2022-08-19 LAB — LEAD BLDV-MCNC: <2 UG/DL

## 2022-09-13 ENCOUNTER — OFFICE VISIT (OUTPATIENT)
Dept: FAMILY MEDICINE | Facility: CLINIC | Age: 32
End: 2022-09-13
Payer: COMMERCIAL

## 2022-09-13 VITALS
WEIGHT: 119 LBS | TEMPERATURE: 98.4 F | OXYGEN SATURATION: 98 % | RESPIRATION RATE: 18 BRPM | BODY MASS INDEX: 18.68 KG/M2 | HEART RATE: 74 BPM | SYSTOLIC BLOOD PRESSURE: 104 MMHG | HEIGHT: 67 IN | DIASTOLIC BLOOD PRESSURE: 58 MMHG

## 2022-09-13 DIAGNOSIS — Z11.59 NEED FOR HEPATITIS C SCREENING TEST: ICD-10-CM

## 2022-09-13 DIAGNOSIS — Z13.220 SCREENING FOR HYPERLIPIDEMIA: ICD-10-CM

## 2022-09-13 DIAGNOSIS — Z00.00 ROUTINE GENERAL MEDICAL EXAMINATION AT A HEALTH CARE FACILITY: Primary | ICD-10-CM

## 2022-09-13 PROBLEM — Z36.89 NST (NON-STRESS TEST) REACTIVE: Status: RESOLVED | Noted: 2018-12-23 | Resolved: 2022-09-13

## 2022-09-13 PROBLEM — O34.219 MATERNAL CARE FOR SCAR FROM PREVIOUS CESAREAN DELIVERY: Status: RESOLVED | Noted: 2019-01-02 | Resolved: 2022-09-13

## 2022-09-13 PROBLEM — O09.93 HIGH-RISK PREGNANCY, THIRD TRIMESTER: Status: RESOLVED | Noted: 2018-06-13 | Resolved: 2022-09-13

## 2022-09-13 PROBLEM — O34.219 PREVIOUS CESAREAN DELIVERY, ANTEPARTUM: Status: RESOLVED | Noted: 2018-08-13 | Resolved: 2022-09-13

## 2022-09-13 PROBLEM — O36.8390 NON-REASSURING ELECTRONIC FETAL MONITORING TRACING: Status: RESOLVED | Noted: 2019-01-02 | Resolved: 2022-09-13

## 2022-09-13 LAB
ALBUMIN SERPL-MCNC: 4.3 G/DL (ref 3.4–5)
ALP SERPL-CCNC: 85 U/L (ref 40–150)
ALT SERPL W P-5'-P-CCNC: 21 U/L (ref 0–50)
ANION GAP SERPL CALCULATED.3IONS-SCNC: 5 MMOL/L (ref 3–14)
AST SERPL W P-5'-P-CCNC: 15 U/L (ref 0–45)
BILIRUB SERPL-MCNC: 0.9 MG/DL (ref 0.2–1.3)
BUN SERPL-MCNC: 19 MG/DL (ref 7–30)
CALCIUM SERPL-MCNC: 9.3 MG/DL (ref 8.5–10.1)
CHLORIDE BLD-SCNC: 108 MMOL/L (ref 94–109)
CHOLEST SERPL-MCNC: 168 MG/DL
CO2 SERPL-SCNC: 28 MMOL/L (ref 20–32)
CREAT SERPL-MCNC: 0.78 MG/DL (ref 0.52–1.04)
FASTING STATUS PATIENT QL REPORTED: YES
GFR SERPL CREATININE-BSD FRML MDRD: >90 ML/MIN/1.73M2
GLUCOSE BLD-MCNC: 92 MG/DL (ref 70–99)
HCV AB SERPL QL IA: NONREACTIVE
HDLC SERPL-MCNC: 72 MG/DL
LDLC SERPL CALC-MCNC: 91 MG/DL
NONHDLC SERPL-MCNC: 96 MG/DL
POTASSIUM BLD-SCNC: 4.4 MMOL/L (ref 3.4–5.3)
PROT SERPL-MCNC: 6.8 G/DL (ref 6.8–8.8)
SODIUM SERPL-SCNC: 141 MMOL/L (ref 133–144)
TRIGL SERPL-MCNC: 26 MG/DL

## 2022-09-13 PROCEDURE — 36415 COLL VENOUS BLD VENIPUNCTURE: CPT | Performed by: PHYSICIAN ASSISTANT

## 2022-09-13 PROCEDURE — 90471 IMMUNIZATION ADMIN: CPT | Performed by: PHYSICIAN ASSISTANT

## 2022-09-13 PROCEDURE — 86803 HEPATITIS C AB TEST: CPT | Performed by: PHYSICIAN ASSISTANT

## 2022-09-13 PROCEDURE — 80061 LIPID PANEL: CPT | Performed by: PHYSICIAN ASSISTANT

## 2022-09-13 PROCEDURE — 80053 COMPREHEN METABOLIC PANEL: CPT | Performed by: PHYSICIAN ASSISTANT

## 2022-09-13 PROCEDURE — 90686 IIV4 VACC NO PRSV 0.5 ML IM: CPT | Performed by: PHYSICIAN ASSISTANT

## 2022-09-13 PROCEDURE — 99385 PREV VISIT NEW AGE 18-39: CPT | Mod: 25 | Performed by: PHYSICIAN ASSISTANT

## 2022-09-13 ASSESSMENT — ENCOUNTER SYMPTOMS
NERVOUS/ANXIOUS: 0
HEMATURIA: 0
HEMATOCHEZIA: 0
MYALGIAS: 0
DIARRHEA: 0
CONSTIPATION: 0
ARTHRALGIAS: 0
PARESTHESIAS: 0
FREQUENCY: 0
ABDOMINAL PAIN: 0
FEVER: 0
HEADACHES: 1
EYE PAIN: 0
CHILLS: 0
DIZZINESS: 0
WEAKNESS: 0
JOINT SWELLING: 0
DYSURIA: 0
PALPITATIONS: 0
NAUSEA: 0
SORE THROAT: 0
HEARTBURN: 0
SHORTNESS OF BREATH: 0
COUGH: 0

## 2022-09-13 NOTE — PROGRESS NOTES
SUBJECTIVE:   CC: Amina Aguayo is an 31 year old woman who presents for preventive health visit.       Patient has been advised of split billing requirements and indicates understanding: Yes  Healthy Habits:     Getting at least 3 servings of Calcium per day:  Yes    Bi-annual eye exam:  Yes    Dental care twice a year:  Yes    Sleep apnea or symptoms of sleep apnea:  None    Diet:  Regular (no restrictions)    Frequency of exercise:  2-3 days/week    Duration of exercise:  15-30 minutes    Taking medications regularly:  Yes    Medication side effects:  Not applicable    PHQ-2 Total Score: 0    Additional concerns today:  No    Went to [facility] a few years ago for a skin check. Mom had melanoma.    4 kids. 8, 5, 3, 1.5  . No new sex partners - declines STD screening.     Family history unknown - mom was adopted. Has a twin sister. Born with sperm donor - no family history known.        Today's PHQ-2 Score:   PHQ-2 (  [employer]) 2022   Q1: Little interest or pleasure in doing things 0   Q2: Feeling down, depressed or hopeless 0   PHQ-2 Score 0   PHQ-2 Total Score (12-17 Years)- Positive if 3 or more points; Administer PHQ-A if positive -   Q1: Little interest or pleasure in doing things Not at all   Q2: Feeling down, depressed or hopeless Not at all   PHQ-2 Score 0       Abuse: Current or Past (Physical, Sexual or Emotional) - No  Do you feel safe in your environment? Yes        Social History     Tobacco Use     Smoking status: Former Smoker     Quit date: 10/8/2013     Years since quittin.9     Smokeless tobacco: Never Used   Substance Use Topics     Alcohol use: No         Alcohol Use 2022   Prescreen: >3 drinks/day or >7 drinks/week? Not Applicable   Prescreen: >3 drinks/day or >7 drinks/week? -       Reviewed orders with patient.  Reviewed health maintenance and updated orders accordingly - Yes  BP Readings from Last 3 Encounters:   22 104/58   21 112/60    04/15/21 94/60    Wt Readings from Last 3 Encounters:   09/13/22 54 kg (119 lb)   06/07/21 59.8 kg (131 lb 12.8 oz)   04/12/21 69.9 kg (154 lb)                    Breast Cancer Screening:    Breast CA Risk Assessment (FHS-7) 9/13/2022   Do you have a family history of breast, colon, or ovarian cancer? No / Unknown         Patient under 40 years of age: Routine Mammogram Screening not recommended.   Pertinent mammograms are reviewed under the imaging tab.    History of abnormal Pap smear: NO - age 30-65 PAP every 5 years with negative HPV co-testing recommended  PAP / HPV Latest Ref Rng & Units 6/7/2021 5/11/2018   PAP (Historical) - NIL NIL   HPV16 NEG:Negative Negative -   HPV18 NEG:Negative Negative -   HRHPV NEG:Negative Negative -     Reviewed and updated as needed this visit by clinical staff   Tobacco  Allergies  Meds  Problems  Med Hx  Surg Hx  Fam Hx  Soc   Hx          Reviewed and updated as needed this visit by Provider   Tobacco  Allergies  Meds  Problems  Med Hx  Surg Hx  Fam Hx               Review of Systems   Constitutional: Negative for chills and fever.   HENT: Negative for congestion, ear pain, hearing loss and sore throat.    Eyes: Negative for pain and visual disturbance.   Respiratory: Negative for cough and shortness of breath.    Cardiovascular: Negative for chest pain, palpitations and peripheral edema.   Gastrointestinal: Negative for abdominal pain, constipation, diarrhea, heartburn, hematochezia and nausea.   Genitourinary: Negative for dysuria, frequency, genital sores, hematuria and urgency.   Musculoskeletal: Negative for arthralgias, joint swelling and myalgias.   Skin: Negative for rash.   Neurological: Positive for headaches. Negative for dizziness, weakness and paresthesias.   Psychiatric/Behavioral: Negative for mood changes. The patient is not nervous/anxious.           OBJECTIVE:   /58   Pulse 74   Temp 98.4  F (36.9  C) (Oral)   Resp 18   Ht 1.702 m (5'  "7\")   Wt 54 kg (119 lb)   LMP  (Exact Date)   SpO2 98%   Breastfeeding Yes   BMI 18.64 kg/m    Physical Exam  GENERAL: healthy, alert and no distress  EYES: Eyes grossly normal to inspection, PERRL and conjunctivae and sclerae normal  HENT: ear canals and TM's normal, nose and mouth without ulcers or lesions  NECK: no adenopathy, no asymmetry, masses, or scars and thyroid normal to palpation  RESP: lungs clear to auscultation - no rales, rhonchi or wheeze  CV: regular rate and rhythm, normal S1 S2, no S3 or S4, no murmur, click or rub, no peripheral edema and peripheral pulses strong  ABDOMEN: soft, nontender, no hepatosplenomegaly, no masses and bowel sounds normal  MS: no gross musculoskeletal defects noted, no edema  SKIN: no suspicious lesions or rashes  NEURO: Normal strength and tone, mentation intact and speech normal  PSYCH: mentation appears normal, affect normal/bright    Diagnostic Test Results:  Labs reviewed in Epic    ASSESSMENT/PLAN:   1. Routine general medical examination at a health care facility  Well adult.  - Comprehensive metabolic panel (BMP + Alb, Alk Phos, ALT, AST, Total. Bili, TP); Future  - Comprehensive metabolic panel (BMP + Alb, Alk Phos, ALT, AST, Total. Bili, TP)    2. Need for hepatitis C screening test  Screen.  - Hepatitis C Screen Reflex to HCV RNA Quant and Genotype; Future  - Hepatitis C Screen Reflex to HCV RNA Quant and Genotype    3. Screening for hyperlipidemia  Screen.  - Lipid panel reflex to direct LDL Fasting; Future  - Lipid panel reflex to direct LDL Fasting     Patient has been advised of split billing requirements and indicates understanding: Yes    COUNSELING:  Special attention given to:        Regular exercise       Healthy diet/nutrition       Consider Hep C screening for all patients one time for ages 18-79 years    Estimated body mass index is 18.64 kg/m  as calculated from the following:    Height as of this encounter: 1.702 m (5' 7\").    Weight as of " this encounter: 54 kg (119 lb).        She reports that she quit smoking about 8 years ago. She has never used smokeless tobacco.      Counseling Resources:  ATP IV Guidelines  Pooled Cohorts Equation Calculator  Breast Cancer Risk Calculator  BRCA-Related Cancer Risk Assessment: FHS-7 Tool  FRAX Risk Assessment  ICSI Preventive Guidelines  Dietary Guidelines for Americans, 2010  USDA's MyPlate  ASA Prophylaxis  Lung CA Screening    ALONZO Franz [facility]

## 2022-12-29 ENCOUNTER — E-VISIT (OUTPATIENT)
Dept: OBGYN | Facility: CLINIC | Age: 32
End: 2022-12-29
Payer: COMMERCIAL

## 2022-12-29 ENCOUNTER — TELEPHONE (OUTPATIENT)
Dept: OBGYN | Facility: CLINIC | Age: 32
End: 2022-12-29

## 2022-12-29 DIAGNOSIS — N64.4 NIPPLE PAIN: Primary | ICD-10-CM

## 2022-12-29 DIAGNOSIS — N64.4 NIPPLE PAIN: ICD-10-CM

## 2022-12-29 DIAGNOSIS — B37.9 CANDIDA INFECTION: ICD-10-CM

## 2022-12-29 PROCEDURE — 99421 OL DIG E/M SVC 5-10 MIN: CPT | Performed by: OBSTETRICS & GYNECOLOGY

## 2022-12-29 RX ORDER — FLUCONAZOLE 200 MG/1
TABLET ORAL
Qty: 15 TABLET | Refills: 0 | Status: SHIPPED | OUTPATIENT
Start: 2022-12-29

## 2022-12-29 NOTE — TELEPHONE ENCOUNTER
Sent APNO Rx to [facility] on [facility] pharmacy w instructions/ingredients.    Pt informed as well.    Tish Devine RN on 12/29/2022 at 3:08 PM

## 2022-12-29 NOTE — PATIENT INSTRUCTIONS
Thank you for choosing us for your care. I have placed an order for a prescription so that you can start treatment. View your full visit summary for details by clicking on the link below. Your pharmacist will able to address any questions you may have about the medication.     If you're not feeling better within 5-7 days, please schedule an appointment.  You can schedule an appointment right here in [facility], or call [phone]  If the visit is for the same symptoms as your eVisit, we'll refund the cost of your eVisit if seen within seven days.

## 2022-12-29 NOTE — TELEPHONE ENCOUNTER
Pt calling   Still breast feeding and the child is being tested for strep but if negative they are going tx her for thrush as her mouth and throat is super red and she is irritable.  Has hx of nipple yeast in prior children and tx'd w APNO and Diflucan    Past 48 hours the child was agitated and completely stopped breastfeeding. The last 3-4 days pt reports she has had similar sx to years ago when tx'd for nipple yeast and wondering if she should be tx'd as well.  Sx: Bilateral raw nipples especially with nursing, nipple redness.    Has not been seen by Dr Marcelino since 6/2021  Needs to have a visit but wondering if an e-visit will suffice.    Will consult w on call provider Dr Brown.    Tish Devine, RN on 12/29/2022 at 11:34 AM    Dr. Brown willing to do an e-visit. Pt submitted e-visit while on phone w nurse. E-visit in Dr Brown in basket.    Pt verbalized understanding, in agreement with plan, and voiced no further questions.  Tish Devine RN on 12/29/2022 at 12:13 PM

## 2023-01-06 ENCOUNTER — TELEPHONE (OUTPATIENT)
Dept: OBGYN | Facility: CLINIC | Age: 33
End: 2023-01-06

## 2023-01-06 NOTE — TELEPHONE ENCOUNTER
LMTCB--any appts available to be seen???  Pt concerned about Mastitis.  Subhash Pearce RN on 1/6/2023 at 9:09 AM

## 2023-01-06 NOTE — TELEPHONE ENCOUNTER
21     Patient calling with concerns of     Currently pumping. 21month at home who recently was ill. Toddlers ears were clear and no strep, however had a reddened throat. Over the course of the last 2 weeks child has weaned herself from breastfeeding. Not due to supply because this is not an issue. But patient  Believes it is just time and wanting to wean self.  Discussed weaning recommendations and sent info via [employer].   Patient  Has just been pumping and not breastfeeding over the last 10days.   Is ok with stopping pumping.   No current  issues with engorgmnet  Last pumped 6oz   No redness or lumps on breast however did seem somewhat engorged earlier however has pumped more.  No fevers. She recently just got over a viral illness as well and has been monitoring her symptoms. Discussed mastitis symptoms and what to watch for. Discussed weaning slowly and not abruptly stopping. Answered all patients questions and discussed comfort measures during weaning process.    Discussed to reach out with any questions or concerns.    Sadie Lehman RN

## 2023-09-06 NOTE — PATIENT INSTRUCTIONS
Preventive Health Recommendations  Female Ages 26 - 39  Yearly exam:   See your health care provider every year in order to    Review health changes.     Discuss preventive care.      Review your medicines if you your doctor has prescribed any.    Until age 30: Get a Pap test every three years (more often if you have had an abnormal result).    After age 30: Talk to your doctor about whether you should have a Pap test every 3 years or have a Pap test with HPV screening every 5 years.   You do not need a Pap test if your uterus was removed (hysterectomy) and you have not had cancer.  You should be tested each year for STDs (sexually transmitted diseases), if you're at risk.   Talk to your provider about how often to have your cholesterol checked.  If you are at risk for diabetes, you should have a diabetes test (fasting glucose).  Shots: Get a flu shot each year. Get a tetanus shot every 10 years.   Nutrition:     Eat at least 5 servings of fruits and vegetables each day.    Eat whole-grain bread, whole-wheat pasta and brown rice instead of white grains and rice.    Get adequate Calcium and Vitamin D.     Lifestyle    Exercise at least 150 minutes a week (30 minutes a day, 5 days of the week). This will help you control your weight and prevent disease.    Limit alcohol to one drink per day.    No smoking.     Wear sunscreen to prevent skin cancer.    See your dentist every six months for an exam and cleaning.     General Sunscreen Counseling: I recommended a broad spectrum sunscreen with a SPF of 30 or higher.  I explained that SPF 30 sunscreens block approximately 97 percent of the sun's harmful rays.  Sunscreens should be applied at least 15 minutes prior to expected sun exposure and then every 2 hours after that as long as sun exposure continues. If swimming or exercising sunscreen should be reapplied every 45 minutes to an hour after getting wet or sweating.  One ounce, or the equivalent of a shot glass full of sunscreen, is adequate to protect the skin not covered by a bathing suit. I also recommended a lip balm with a sunscreen as well. Sun protective clothing can be used in lieu of sunscreen but must be worn the entire time you are exposed to the sun's rays. Detail Level: Detailed

## 2023-11-04 ENCOUNTER — HEALTH MAINTENANCE LETTER (OUTPATIENT)
Age: 33
End: 2023-11-04

## 2024-04-01 PROBLEM — O41.8X10 OTHER SPECIFIED DISORDERS OF AMNIOTIC FLUID AND MEMBRANES, FIRST TRIMESTER, NOT APPLICABLE OR UNSPECIFIED: Status: ACTIVE | Noted: 2018-06-13

## 2024-12-22 ENCOUNTER — HEALTH MAINTENANCE LETTER (OUTPATIENT)
Age: 34
End: 2024-12-22